# Patient Record
Sex: MALE | Race: BLACK OR AFRICAN AMERICAN | NOT HISPANIC OR LATINO | Employment: OTHER | URBAN - METROPOLITAN AREA
[De-identification: names, ages, dates, MRNs, and addresses within clinical notes are randomized per-mention and may not be internally consistent; named-entity substitution may affect disease eponyms.]

---

## 2023-03-06 LAB
ALBUMIN (G/DL) IN SER/PLAS: 3.7 G/DL (ref 3.4–5)
ANION GAP IN SER/PLAS: 14 MMOL/L (ref 10–20)
CALCIUM (MG/DL) IN SER/PLAS: 9.5 MG/DL (ref 8.6–10.6)
CARBON DIOXIDE, TOTAL (MMOL/L) IN SER/PLAS: 26 MMOL/L (ref 21–32)
CHLORIDE (MMOL/L) IN SER/PLAS: 108 MMOL/L (ref 98–107)
CREATININE (MG/DL) IN SER/PLAS: 0.76 MG/DL (ref 0.5–1.3)
ERYTHROCYTE DISTRIBUTION WIDTH (RATIO) BY AUTOMATED COUNT: 17.2 % (ref 11.5–14.5)
ERYTHROCYTE MEAN CORPUSCULAR HEMOGLOBIN CONCENTRATION (G/DL) BY AUTOMATED: 32.3 G/DL (ref 32–36)
ERYTHROCYTE MEAN CORPUSCULAR VOLUME (FL) BY AUTOMATED COUNT: 85 FL (ref 80–100)
ERYTHROCYTES (10*6/UL) IN BLOOD BY AUTOMATED COUNT: 3.94 X10E12/L (ref 4.5–5.9)
GFR MALE: >90 ML/MIN/1.73M2
GLUCOSE (MG/DL) IN SER/PLAS: 114 MG/DL (ref 74–99)
HEMATOCRIT (%) IN BLOOD BY AUTOMATED COUNT: 33.4 % (ref 41–52)
HEMOGLOBIN (G/DL) IN BLOOD: 10.8 G/DL (ref 13.5–17.5)
LEUKOCYTES (10*3/UL) IN BLOOD BY AUTOMATED COUNT: 6.9 X10E9/L (ref 4.4–11.3)
NRBC (PER 100 WBCS) BY AUTOMATED COUNT: 0 /100 WBC (ref 0–0)
PHOSPHATE (MG/DL) IN SER/PLAS: 2.5 MG/DL (ref 2.5–4.9)
PLATELETS (10*3/UL) IN BLOOD AUTOMATED COUNT: 358 X10E9/L (ref 150–450)
POTASSIUM (MMOL/L) IN SER/PLAS: 3.8 MMOL/L (ref 3.5–5.3)
SODIUM (MMOL/L) IN SER/PLAS: 144 MMOL/L (ref 136–145)
TACROLIMUS (NG/ML) IN BLOOD: 3.9 NG/ML (ref 2–15)
UREA NITROGEN (MG/DL) IN SER/PLAS: 12 MG/DL (ref 6–23)

## 2023-03-13 LAB
APPEARANCE, URINE: CLEAR
BILIRUBIN, URINE: NEGATIVE
BLOOD, URINE: ABNORMAL
COLOR, URINE: YELLOW
CREATININE (MG/DL) IN URINE: 26.3 MG/DL (ref 20–370)
GLUCOSE, URINE: NEGATIVE MG/DL
KETONES, URINE: NEGATIVE MG/DL
LEUKOCYTE ESTERASE, URINE: ABNORMAL
NITRITE, URINE: NEGATIVE
PH, URINE: 8 (ref 5–8)
PROTEIN (MG/DL) IN URINE: 15 MG/DL (ref 5–25)
PROTEIN, URINE: NEGATIVE MG/DL
PROTEIN/CREATININE (MG/MG) IN URINE: 0.57 MG/MG CREAT (ref 0–0.17)
RBC, URINE: 6 /HPF (ref 0–5)
SPECIFIC GRAVITY, URINE: 1.01 (ref 1–1.03)
TACROLIMUS (NG/ML) IN BLOOD: 3.7 NG/ML (ref 2–15)
UROBILINOGEN, URINE: <2 MG/DL (ref 0–1.9)
WBC, URINE: 23 /HPF (ref 0–5)

## 2023-03-14 LAB — URINE CULTURE: NORMAL

## 2023-03-21 LAB
ALBUMIN (G/DL) IN SER/PLAS: 3.4 G/DL (ref 3.4–5)
ANION GAP IN SER/PLAS: 12 MMOL/L (ref 10–20)
CALCIUM (MG/DL) IN SER/PLAS: 9.2 MG/DL (ref 8.6–10.6)
CARBON DIOXIDE, TOTAL (MMOL/L) IN SER/PLAS: 24 MMOL/L (ref 21–32)
CHLORIDE (MMOL/L) IN SER/PLAS: 106 MMOL/L (ref 98–107)
CREATININE (MG/DL) IN SER/PLAS: 0.81 MG/DL (ref 0.5–1.3)
ERYTHROCYTE DISTRIBUTION WIDTH (RATIO) BY AUTOMATED COUNT: 16.6 % (ref 11.5–14.5)
ERYTHROCYTE MEAN CORPUSCULAR HEMOGLOBIN CONCENTRATION (G/DL) BY AUTOMATED: 34.9 G/DL (ref 32–36)
ERYTHROCYTE MEAN CORPUSCULAR VOLUME (FL) BY AUTOMATED COUNT: 77 FL (ref 80–100)
ERYTHROCYTES (10*6/UL) IN BLOOD BY AUTOMATED COUNT: 3.92 X10E12/L (ref 4.5–5.9)
GFR MALE: >90 ML/MIN/1.73M2
GLUCOSE (MG/DL) IN SER/PLAS: 139 MG/DL (ref 74–99)
HEMATOCRIT (%) IN BLOOD BY AUTOMATED COUNT: 30.1 % (ref 41–52)
HEMOGLOBIN (G/DL) IN BLOOD: 10.5 G/DL (ref 13.5–17.5)
LEUKOCYTES (10*3/UL) IN BLOOD BY AUTOMATED COUNT: 7.2 X10E9/L (ref 4.4–11.3)
NRBC (PER 100 WBCS) BY AUTOMATED COUNT: 0 /100 WBC (ref 0–0)
PHOSPHATE (MG/DL) IN SER/PLAS: 2.9 MG/DL (ref 2.5–4.9)
PLATELETS (10*3/UL) IN BLOOD AUTOMATED COUNT: 401 X10E9/L (ref 150–450)
POTASSIUM (MMOL/L) IN SER/PLAS: 3.7 MMOL/L (ref 3.5–5.3)
SODIUM (MMOL/L) IN SER/PLAS: 138 MMOL/L (ref 136–145)
TACROLIMUS (NG/ML) IN BLOOD: 5.6 NG/ML (ref 2–15)
UREA NITROGEN (MG/DL) IN SER/PLAS: 16 MG/DL (ref 6–23)

## 2023-03-28 LAB
ALBUMIN (G/DL) IN SER/PLAS: 3.5 G/DL (ref 3.4–5)
ANION GAP IN SER/PLAS: 14 MMOL/L (ref 10–20)
CALCIUM (MG/DL) IN SER/PLAS: 9.1 MG/DL (ref 8.6–10.6)
CARBON DIOXIDE, TOTAL (MMOL/L) IN SER/PLAS: 23 MMOL/L (ref 21–32)
CHLORIDE (MMOL/L) IN SER/PLAS: 110 MMOL/L (ref 98–107)
CREATININE (MG/DL) IN SER/PLAS: 0.76 MG/DL (ref 0.5–1.3)
ERYTHROCYTE DISTRIBUTION WIDTH (RATIO) BY AUTOMATED COUNT: 16.8 % (ref 11.5–14.5)
ERYTHROCYTE MEAN CORPUSCULAR HEMOGLOBIN CONCENTRATION (G/DL) BY AUTOMATED: 34.1 G/DL (ref 32–36)
ERYTHROCYTE MEAN CORPUSCULAR VOLUME (FL) BY AUTOMATED COUNT: 77 FL (ref 80–100)
ERYTHROCYTES (10*6/UL) IN BLOOD BY AUTOMATED COUNT: 4.15 X10E12/L (ref 4.5–5.9)
GFR MALE: >90 ML/MIN/1.73M2
GLUCOSE (MG/DL) IN SER/PLAS: 96 MG/DL (ref 74–99)
HEMATOCRIT (%) IN BLOOD BY AUTOMATED COUNT: 32 % (ref 41–52)
HEMOGLOBIN (G/DL) IN BLOOD: 10.9 G/DL (ref 13.5–17.5)
LEUKOCYTES (10*3/UL) IN BLOOD BY AUTOMATED COUNT: 7.9 X10E9/L (ref 4.4–11.3)
NRBC (PER 100 WBCS) BY AUTOMATED COUNT: 0 /100 WBC (ref 0–0)
PHOSPHATE (MG/DL) IN SER/PLAS: 2.7 MG/DL (ref 2.5–4.9)
PLATELETS (10*3/UL) IN BLOOD AUTOMATED COUNT: 455 X10E9/L (ref 150–450)
POTASSIUM (MMOL/L) IN SER/PLAS: 4.5 MMOL/L (ref 3.5–5.3)
SODIUM (MMOL/L) IN SER/PLAS: 142 MMOL/L (ref 136–145)
TACROLIMUS (NG/ML) IN BLOOD: 10.6 NG/ML (ref 2–15)
UREA NITROGEN (MG/DL) IN SER/PLAS: 12 MG/DL (ref 6–23)

## 2023-03-30 LAB
CREATININE (MG/DL) IN URINE: NORMAL
PROTEIN (MG/DL) IN URINE: NORMAL
PROTEIN/CREATININE (MG/MG) IN URINE: NORMAL

## 2023-04-04 ENCOUNTER — APPOINTMENT (OUTPATIENT)
Dept: LAB | Facility: LAB | Age: 73
End: 2023-04-04
Payer: COMMERCIAL

## 2023-04-04 LAB
ALBUMIN (G/DL) IN SER/PLAS: 3.6 G/DL (ref 3.4–5)
ANION GAP IN SER/PLAS: 13 MMOL/L (ref 10–20)
CALCIUM (MG/DL) IN SER/PLAS: 9.3 MG/DL (ref 8.6–10.6)
CARBON DIOXIDE, TOTAL (MMOL/L) IN SER/PLAS: 24 MMOL/L (ref 21–32)
CHLORIDE (MMOL/L) IN SER/PLAS: 105 MMOL/L (ref 98–107)
CREATININE (MG/DL) IN SER/PLAS: 0.76 MG/DL (ref 0.5–1.3)
ERYTHROCYTE DISTRIBUTION WIDTH (RATIO) BY AUTOMATED COUNT: 17.1 % (ref 11.5–14.5)
ERYTHROCYTE MEAN CORPUSCULAR HEMOGLOBIN CONCENTRATION (G/DL) BY AUTOMATED: 33.5 G/DL (ref 32–36)
ERYTHROCYTE MEAN CORPUSCULAR VOLUME (FL) BY AUTOMATED COUNT: 77 FL (ref 80–100)
ERYTHROCYTES (10*6/UL) IN BLOOD BY AUTOMATED COUNT: 4.23 X10E12/L (ref 4.5–5.9)
GFR MALE: >90 ML/MIN/1.73M2
GLUCOSE (MG/DL) IN SER/PLAS: 89 MG/DL (ref 74–99)
HEMATOCRIT (%) IN BLOOD BY AUTOMATED COUNT: 32.5 % (ref 41–52)
HEMOGLOBIN (G/DL) IN BLOOD: 10.9 G/DL (ref 13.5–17.5)
INR IN PPP BY COAGULATION ASSAY: 5.1 (ref 0.9–1.1)
LEUKOCYTES (10*3/UL) IN BLOOD BY AUTOMATED COUNT: 7.8 X10E9/L (ref 4.4–11.3)
NRBC (PER 100 WBCS) BY AUTOMATED COUNT: 0 /100 WBC (ref 0–0)
PHOSPHATE (MG/DL) IN SER/PLAS: 2.9 MG/DL (ref 2.5–4.9)
PLATELETS (10*3/UL) IN BLOOD AUTOMATED COUNT: 401 X10E9/L (ref 150–450)
POTASSIUM (MMOL/L) IN SER/PLAS: 4 MMOL/L (ref 3.5–5.3)
PROTHROMBIN TIME (PT) IN PPP BY COAGULATION ASSAY: 60 SEC (ref 9.8–13.4)
SODIUM (MMOL/L) IN SER/PLAS: 138 MMOL/L (ref 136–145)
TACROLIMUS (NG/ML) IN BLOOD: 6.8 NG/ML (ref 2–15)
UREA NITROGEN (MG/DL) IN SER/PLAS: 12 MG/DL (ref 6–23)

## 2023-07-31 ENCOUNTER — APPOINTMENT (OUTPATIENT)
Dept: LAB | Facility: LAB | Age: 73
End: 2023-07-31
Payer: COMMERCIAL

## 2023-07-31 LAB
ALBUMIN (G/DL) IN SER/PLAS: 4.1 G/DL (ref 3.4–5)
ANION GAP IN SER/PLAS: 12 MMOL/L (ref 10–20)
CALCIUM (MG/DL) IN SER/PLAS: 9.5 MG/DL (ref 8.6–10.6)
CARBON DIOXIDE, TOTAL (MMOL/L) IN SER/PLAS: 26 MMOL/L (ref 21–32)
CHLORIDE (MMOL/L) IN SER/PLAS: 105 MMOL/L (ref 98–107)
CREATININE (MG/DL) IN SER/PLAS: 0.91 MG/DL (ref 0.5–1.3)
CREATININE (MG/DL) IN URINE: 93.3 MG/DL (ref 20–370)
ERYTHROCYTE DISTRIBUTION WIDTH (RATIO) BY AUTOMATED COUNT: 20.8 % (ref 11.5–14.5)
ERYTHROCYTE MEAN CORPUSCULAR HEMOGLOBIN CONCENTRATION (G/DL) BY AUTOMATED: 33.1 G/DL (ref 32–36)
ERYTHROCYTE MEAN CORPUSCULAR VOLUME (FL) BY AUTOMATED COUNT: 76 FL (ref 80–100)
ERYTHROCYTES (10*6/UL) IN BLOOD BY AUTOMATED COUNT: 4.51 X10E12/L (ref 4.5–5.9)
GFR MALE: 89 ML/MIN/1.73M2
GLUCOSE (MG/DL) IN SER/PLAS: 169 MG/DL (ref 74–99)
HEMATOCRIT (%) IN BLOOD BY AUTOMATED COUNT: 34.1 % (ref 41–52)
HEMOGLOBIN (G/DL) IN BLOOD: 11.3 G/DL (ref 13.5–17.5)
LEUKOCYTES (10*3/UL) IN BLOOD BY AUTOMATED COUNT: 6.3 X10E9/L (ref 4.4–11.3)
NRBC (PER 100 WBCS) BY AUTOMATED COUNT: 0 /100 WBC (ref 0–0)
PHOSPHATE (MG/DL) IN SER/PLAS: 3.1 MG/DL (ref 2.5–4.9)
PLATELETS (10*3/UL) IN BLOOD AUTOMATED COUNT: 309 X10E9/L (ref 150–450)
POTASSIUM (MMOL/L) IN SER/PLAS: 4.2 MMOL/L (ref 3.5–5.3)
SODIUM (MMOL/L) IN SER/PLAS: 139 MMOL/L (ref 136–145)
TACROLIMUS (NG/ML) IN BLOOD: 3.3 NG/ML (ref 2–15)
UREA NITROGEN (MG/DL) IN SER/PLAS: 16 MG/DL (ref 6–23)

## 2023-08-11 ENCOUNTER — APPOINTMENT (OUTPATIENT)
Dept: LAB | Facility: LAB | Age: 73
End: 2023-08-11
Payer: COMMERCIAL

## 2023-08-11 LAB
ALBUMIN (G/DL) IN SER/PLAS: 4 G/DL (ref 3.4–5)
ANION GAP IN SER/PLAS: 13 MMOL/L (ref 10–20)
CALCIUM (MG/DL) IN SER/PLAS: 9.7 MG/DL (ref 8.6–10.6)
CARBON DIOXIDE, TOTAL (MMOL/L) IN SER/PLAS: 27 MMOL/L (ref 21–32)
CHLORIDE (MMOL/L) IN SER/PLAS: 105 MMOL/L (ref 98–107)
CREATININE (MG/DL) IN SER/PLAS: 1.03 MG/DL (ref 0.5–1.3)
CREATININE (MG/DL) IN URINE: 73.4 MG/DL (ref 20–370)
ERYTHROCYTE DISTRIBUTION WIDTH (RATIO) BY AUTOMATED COUNT: 19.9 % (ref 11.5–14.5)
ERYTHROCYTE MEAN CORPUSCULAR HEMOGLOBIN CONCENTRATION (G/DL) BY AUTOMATED: 33 G/DL (ref 32–36)
ERYTHROCYTE MEAN CORPUSCULAR VOLUME (FL) BY AUTOMATED COUNT: 75 FL (ref 80–100)
ERYTHROCYTES (10*6/UL) IN BLOOD BY AUTOMATED COUNT: 4.44 X10E12/L (ref 4.5–5.9)
GFR MALE: 77 ML/MIN/1.73M2
GLUCOSE (MG/DL) IN SER/PLAS: 111 MG/DL (ref 74–99)
HEMATOCRIT (%) IN BLOOD BY AUTOMATED COUNT: 33.3 % (ref 41–52)
HEMOGLOBIN (G/DL) IN BLOOD: 11 G/DL (ref 13.5–17.5)
LEUKOCYTES (10*3/UL) IN BLOOD BY AUTOMATED COUNT: 6.2 X10E9/L (ref 4.4–11.3)
NRBC (PER 100 WBCS) BY AUTOMATED COUNT: 0 /100 WBC (ref 0–0)
PHOSPHATE (MG/DL) IN SER/PLAS: 3.6 MG/DL (ref 2.5–4.9)
PLATELETS (10*3/UL) IN BLOOD AUTOMATED COUNT: 353 X10E9/L (ref 150–450)
POTASSIUM (MMOL/L) IN SER/PLAS: 3.9 MMOL/L (ref 3.5–5.3)
PROTEIN (MG/DL) IN URINE: 44 MG/DL (ref 5–25)
PROTEIN/CREATININE (MG/MG) IN URINE: 0.6 MG/MG CREAT (ref 0–0.17)
SODIUM (MMOL/L) IN SER/PLAS: 141 MMOL/L (ref 136–145)
TACROLIMUS (NG/ML) IN BLOOD: 8.5 NG/ML (ref 2–15)
UREA NITROGEN (MG/DL) IN SER/PLAS: 18 MG/DL (ref 6–23)

## 2023-09-08 DIAGNOSIS — M54.9 BACK PAIN, UNSPECIFIED BACK LOCATION, UNSPECIFIED BACK PAIN LATERALITY, UNSPECIFIED CHRONICITY: Primary | ICD-10-CM

## 2023-09-08 RX ORDER — OXYCODONE HYDROCHLORIDE 5 MG/1
5 TABLET ORAL EVERY 8 HOURS PRN
Qty: 90 TABLET | Refills: 0 | Status: SHIPPED | OUTPATIENT
Start: 2023-09-08 | End: 2023-11-27 | Stop reason: ALTCHOICE

## 2023-09-12 ENCOUNTER — DOCUMENTATION (OUTPATIENT)
Dept: POST ACUTE CARE | Facility: EXTERNAL LOCATION | Age: 73
End: 2023-09-12
Payer: COMMERCIAL

## 2023-09-12 NOTE — PROGRESS NOTES
Therapy update:  9/11/2023: FWW/ 70'/ min A-CGA/, Transfer training to increase functional task  performance/ CGA. Bed mobility training to increase functional task performance/ sit to supine / SBA

## 2023-09-14 ENCOUNTER — NURSING HOME VISIT (OUTPATIENT)
Dept: POST ACUTE CARE | Facility: EXTERNAL LOCATION | Age: 73
End: 2023-09-14
Payer: COMMERCIAL

## 2023-09-14 DIAGNOSIS — E78.5 HYPERLIPIDEMIA, UNSPECIFIED HYPERLIPIDEMIA TYPE: ICD-10-CM

## 2023-09-14 DIAGNOSIS — N18.6 ESRD (END STAGE RENAL DISEASE) ON DIALYSIS (MULTI): ICD-10-CM

## 2023-09-14 DIAGNOSIS — R06.09 DOE (DYSPNEA ON EXERTION): ICD-10-CM

## 2023-09-14 DIAGNOSIS — I10 PRIMARY HYPERTENSION: Primary | ICD-10-CM

## 2023-09-14 DIAGNOSIS — Z91.81 STATUS POST FALL: ICD-10-CM

## 2023-09-14 DIAGNOSIS — I51.89 DIASTOLIC DYSFUNCTION: ICD-10-CM

## 2023-09-14 DIAGNOSIS — K74.60 CIRRHOSIS OF LIVER WITHOUT ASCITES, UNSPECIFIED HEPATIC CIRRHOSIS TYPE (MULTI): ICD-10-CM

## 2023-09-14 DIAGNOSIS — J44.89 OBSTRUCTIVE CHRONIC BRONCHITIS WITHOUT EXACERBATION (MULTI): ICD-10-CM

## 2023-09-14 DIAGNOSIS — Z99.2 ESRD (END STAGE RENAL DISEASE) ON DIALYSIS (MULTI): ICD-10-CM

## 2023-09-14 DIAGNOSIS — I73.9 PAD (PERIPHERAL ARTERY DISEASE) (CMS-HCC): ICD-10-CM

## 2023-09-14 PROBLEM — I11.9 HYPERTENSIVE HEART DISEASE WITHOUT CONGESTIVE HEART FAILURE: Status: ACTIVE | Noted: 2023-09-14

## 2023-09-14 PROCEDURE — 99305 1ST NF CARE MODERATE MDM 35: CPT | Performed by: INTERNAL MEDICINE

## 2023-09-14 NOTE — LETTER
Patient: Albert Schwartz  : 1950    Encounter Date: 2023    HISTORY & PHYSICAL    Subjective  Chief complaint: Albert Schwartz is a 72 y.o. male who is a acute skilled care patient being seen and evaluated for multiple medical problems.  Patient presents for weakness.    HPI:  Patient is a 72 year old male who presented to the ED after a fall. Pt complained of nausea and warmth, and shortness of breath post fall. Patient's imaging was clear but labs showed leukocytosis. Pt was found to have a UTI and given antibiotics and fluid. Patient was discharged back to the nursing facility.         Past Medical History:   Diagnosis Date   • Abnormality of alphafetoprotein     Elevated AFP   • Alcohol dependence, uncomplicated (CMS/HCC) 2018    Alcohol dependence   • Alcohol dependence, uncomplicated (CMS/HCC) 2018    Alcohol dependence   • Alcohol dependence, uncomplicated (CMS/HCC) 2018    Alcohol dependence   • Alcohol dependence, uncomplicated (CMS/HCC) 2018    Alcohol dependence   • Alcoholic cirrhosis of liver with ascites (CMS/HCC) 10/05/2017    Alcoholic cirrhosis of liver with ascites   • Awaiting organ transplant status 2018    Pre-liver transplant, listed   • Awaiting organ transplant status 2018    Pre-liver transplant, listed   • Awaiting organ transplant status 2018    Pre-liver transplant, listed   • Awaiting organ transplant status 2018    Pre-liver transplant, listed   • Benign prostatic hyperplasia with lower urinary tract symptoms 2018    BPH associated with nocturia   • Benign prostatic hyperplasia with lower urinary tract symptoms 2018    BPH associated with nocturia   • Benign prostatic hyperplasia with lower urinary tract symptoms 2018    BPH associated with nocturia   • Benign prostatic hyperplasia with lower urinary tract symptoms 2018    BPH associated with nocturia   • Chronic diastolic (congestive) heart failure  (CMS/[facility]) 01/18/2018    Chronic diastolic congestive heart failure   • Chronic diastolic (congestive) heart failure (CMS/[facility]) 01/18/2018    Chronic diastolic congestive heart failure   • Chronic diastolic (congestive) heart failure (CMS/[facility]) 01/18/2018    Chronic diastolic congestive heart failure   • Chronic diastolic (congestive) heart failure (CMS/[facility]) 01/18/2018    Chronic diastolic congestive heart failure   • Chronic kidney disease, stage 3 unspecified (CMS/HCC) 01/18/2018    Chronic kidney disease (CKD), stage III (moderate)   • Chronic kidney disease, stage 3 unspecified (CMS/HCC) 01/18/2018    Chronic kidney disease (CKD), stage III (moderate)   • Chronic kidney disease, stage 3 unspecified (CMS/HCC) 01/18/2018    Chronic kidney disease (CKD), stage III (moderate)   • Chronic kidney disease, stage 3 unspecified (CMS/HCC) 01/18/2018    Chronic kidney disease (CKD), stage III (moderate)   • Chronic kidney disease, stage 4 (severe) (CMS/HCC) 10/20/2016    Chronic kidney disease, stage IV (severe)   • Chronic viral hepatitis C (CMS/[facility]) 01/18/2018    Chronic viral hepatitis C   • Chronic viral hepatitis C (CMS/[facility]) 01/18/2018    Chronic viral hepatitis C   • Constipation, unspecified 01/18/2018    Constipation   • Constipation, unspecified 01/18/2018    Constipation   • Constipation, unspecified 01/18/2018    Constipation   • Constipation, unspecified 01/18/2018    Constipation   • Disorder of kidney and ureter, unspecified 01/18/2018    Renal/ureteral disease   • Disorder of kidney and ureter, unspecified 01/18/2018    Renal/ureteral disease   • Disorder of kidney and ureter, unspecified 01/18/2018    Renal/ureteral disease   • Disorder of kidney and ureter, unspecified 01/18/2018    Renal/ureteral disease   • Disorder of male genital organs, unspecified 12/11/2018    Testicular abnormality   • Disorder of male genital organs, unspecified 01/18/2018    Testicular abnormality   • Disorder of male genital  organs, unspecified 01/18/2018    Testicular abnormality   • Disorder of male genital organs, unspecified 01/18/2018    Testicular abnormality   • Disorder of male genital organs, unspecified 01/18/2018    Testicular abnormality   • Encounter for other preprocedural examination 01/18/2018    Preoperative testing   • Encounter for other preprocedural examination 01/18/2018    Encounter for pre-transplant evaluation for chronic liver disease   • Encounter for other preprocedural examination 01/18/2018    Pre-transplant evaluation for ESRD (end stage renal disease)   • Encounter for other preprocedural examination 01/18/2018    Encounter for pre-transplant evaluation for chronic liver disease   • Encounter for other preprocedural examination 01/18/2018    Preoperative testing   • Encounter for other preprocedural examination 01/18/2018    Pre-transplant evaluation for ESRD (end stage renal disease)   • Encounter for other preprocedural examination 01/18/2018    Encounter for pre-transplant evaluation for chronic liver disease   • Encounter for other preprocedural examination 01/18/2018    Pre-transplant evaluation for ESRD (end stage renal disease)   • Encounter for other preprocedural examination 01/18/2018    Preoperative testing   • Encounter for other preprocedural examination 01/18/2018    Encounter for pre-transplant evaluation for chronic liver disease   • Encounter for other preprocedural examination 01/18/2018    Preoperative testing   • Encounter for other preprocedural examination 01/18/2018    Pre-transplant evaluation for ESRD (end stage renal disease)   • Encounter for screening for malignant neoplasm of prostate 01/18/2018    Encounter for prostate cancer screening   • Encounter for screening for malignant neoplasm of prostate 01/18/2018    Encounter for prostate cancer screening   • Encounter for screening for malignant neoplasm of prostate 01/18/2018    Encounter for prostate cancer screening   •  Encounter for screening for malignant neoplasm of prostate 01/18/2018    Encounter for prostate cancer screening   • End stage renal disease (CMS/HCC) 01/18/2018    ESRD (end stage renal disease) on dialysis   • End stage renal disease (CMS/HCC) 01/18/2018    ESRD (end stage renal disease) on dialysis   • Essential (primary) hypertension 01/27/2020    Hypertension   • Immunodeficiency, unspecified (CMS/HCC) 01/18/2018    Immunosuppression   • Immunodeficiency, unspecified (CMS/HCC) 01/18/2018    Immunosuppression   • Immunodeficiency, unspecified (CMS/HCC) 01/18/2018    Immunosuppression   • Immunodeficiency, unspecified (CMS/HCC) 01/18/2018    Immunosuppression   • Kidney transplant status 01/18/2018    Kidney replaced by transplant   • Kidney transplant status 01/18/2018    Kidney replaced by transplant   • Liver disease, unspecified 01/18/2018    Liver disease   • Liver disease, unspecified 01/18/2018    Liver disease   • Liver disease, unspecified 01/18/2018    Liver disease   • Liver disease, unspecified 01/18/2018    Liver disease   • Other ascites 11/07/2022    Ascites   • Other conditions influencing health status 01/18/2018    Wound pain   • Other conditions influencing health status 01/18/2018    Wound pain   • Other conditions influencing health status 01/18/2018    Wound pain   • Other conditions influencing health status 01/18/2018    Wound pain   • Other ill-defined heart diseases 01/18/2018    Diastolic dysfunction   • Other ill-defined heart diseases 01/18/2018    Diastolic dysfunction   • Other ill-defined heart diseases 01/18/2018    Diastolic dysfunction   • Other ill-defined heart diseases 01/18/2018    Diastolic dysfunction   • Other specified personal risk factors, not elsewhere classified 01/18/2018    At risk for infection transmitted from donor   • Other specified personal risk factors, not elsewhere classified 01/18/2018    At risk for infection transmitted from donor   • Other specified  personal risk factors, not elsewhere classified 01/18/2018    At risk for infection transmitted from donor   • Other specified personal risk factors, not elsewhere classified 01/18/2018    At risk for infection transmitted from donor   • Pain in left hand 01/18/2018    Pain of left hand   • Pain in left hand 01/18/2018    Pain of left hand   • Pain in left hand 01/18/2018    Pain of left hand   • Pain in left hand 01/18/2018    Pain of left hand   • Peripheral vascular disease, unspecified (CMS/HCC) 01/18/2018    PAD (peripheral artery disease)   • Peripheral vascular disease, unspecified (CMS/HCC) 01/18/2018    PAD (peripheral artery disease)   • Peripheral vascular disease, unspecified (CMS/HCC) 01/18/2018    PAD (peripheral artery disease)   • Peripheral vascular disease, unspecified (CMS/HCC) 01/18/2018    PAD (peripheral artery disease)   • Personal history of other diseases of the circulatory system 08/18/2014    History of essential hypertension   • Personal history of other diseases of the musculoskeletal system and connective tissue     History of arthritis   • Pruritus, unspecified 02/21/2018    Pruritus   • Type 2 diabetes mellitus without complications (CMS/HCC) 09/01/2022    Diabetes mellitus   • Unspecified cirrhosis of liver (CMS/HCC) 01/18/2018    Cirrhosis   • Unspecified cirrhosis of liver (CMS/HCC) 01/18/2018    Cirrhosis   • Unspecified cirrhosis of liver (CMS/HCC) 01/18/2018    Cirrhosis   • Unspecified cirrhosis of liver (CMS/HCC) 01/18/2018    Cirrhosis   • Unspecified complication of cardiac and vascular prosthetic device, implant and graft, initial encounter 01/18/2018    Complication of arteriovenous dialysis fistula, initial encounter   • Unspecified complication of cardiac and vascular prosthetic device, implant and graft, initial encounter 01/18/2018    Complication of arteriovenous dialysis fistula, initial encounter   • Unspecified complication of cardiac and vascular prosthetic  device, implant and graft, initial encounter 01/18/2018    Complication of arteriovenous dialysis fistula, initial encounter   • Unspecified complication of cardiac and vascular prosthetic device, implant and graft, initial encounter 01/18/2018    Complication of arteriovenous dialysis fistula, initial encounter   • Unspecified visual disturbance 01/18/2018    Vision changes   • Unspecified visual disturbance 01/18/2018    Vision changes   • Unspecified visual disturbance 01/18/2018    Vision changes   • Unspecified visual disturbance 01/18/2018    Vision changes       Past Surgical History:   Procedure Laterality Date   • CATARACT EXTRACTION  01/18/2018    Cataract Extraction   • FL GUIDED ABDOMINAL PARACENTESIS  3/30/2015    FL GUIDED ABDOMINAL PARACENTESIS 3/30/2015 [facility] [place] [place] AIB LEGACY   • FL GUIDED ABDOMINAL PARACENTESIS  4/14/2015    FL GUIDED ABDOMINAL PARACENTESIS 4/14/2015 [facility] [place] [place] INPATIENT LEGACY   • FL GUIDED ABDOMINAL PARACENTESIS  8/28/2015    FL GUIDED ABDOMINAL PARACENTESIS 8/28/2015 [facility] [place] [place] AIB LEGACY   • KIDNEY SURGERY  06/02/2020    Kidney Surgery   • OTHER SURGICAL HISTORY  08/18/2014    Brain Surgery   • OTHER SURGICAL HISTORY  06/30/2020    Renal Transplant   • OTHER SURGICAL HISTORY  06/02/2020    Incisional Hernia Repair   • SPLENECTOMY, TOTAL  06/02/2020    Splenectomy   • US GUIDED ABDOMINAL PARACENTESIS  3/20/2014    US GUIDED ABDOMINAL PARACENTESIS 3/20/2014 [facility] [place] [place] AIB LEGACY   • US GUIDED ABDOMINAL PARACENTESIS  8/14/2014    US GUIDED ABDOMINAL PARACENTESIS 8/14/2014 [facility] [place] [place] AIB LEGACY   • US GUIDED ABDOMINAL PARACENTESIS  11/17/2014    US GUIDED ABDOMINAL PARACENTESIS 11/17/2014 [facility] [place] [place] AIB LEGACY   • US GUIDED ABDOMINAL PARACENTESIS  12/11/2014    US GUIDED ABDOMINAL PARACENTESIS 12/11/2014 [facility] CLINICAL LEGACY   • US GUIDED ABDOMINAL PARACENTESIS  1/27/2015    US GUIDED ABDOMINAL PARACENTESIS 1/27/2015 [facility] [place] [place] ANCILLARY LEGACY   • US GUIDED ABDOMINAL PARACENTESIS  2/19/2015    US GUIDED ABDOMINAL PARACENTESIS  2/19/2015 [facility] [place] AIB LEGACY   • US GUIDED ABDOMINAL PARACENTESIS  3/18/2015    US GUIDED ABDOMINAL PARACENTESIS 3/18/2015 [facility] [place] AIB LEGACY   • US GUIDED ABDOMINAL PARACENTESIS  4/17/2015    US GUIDED ABDOMINAL PARACENTESIS 4/17/2015 [facility] [place] INPATIENT LEGACY   • US GUIDED ABDOMINAL PARACENTESIS  7/14/2015    US GUIDED ABDOMINAL PARACENTESIS 7/14/2015 [facility] [place] AIB LEGACY   • US GUIDED ABDOMINAL PARACENTESIS  10/12/2015    US GUIDED ABDOMINAL PARACENTESIS 10/12/2015 [facility] CLINICAL LEGACY   • US GUIDED ABDOMINAL PARACENTESIS  10/19/2015    US GUIDED ABDOMINAL PARACENTESIS 10/19/2015 [facility] CLINICAL LEGACY       No family history on file.    Social History     Socioeconomic History   • Marital status: Single     Spouse name: Not on file   • Number of children: Not on file   • Years of education: Not on file   • Highest education level: Not on file   Occupational History   • Not on file   Tobacco Use   • Smoking status: Not on file   • Smokeless tobacco: Not on file   Substance and Sexual Activity   • Alcohol use: Not on file   • Drug use: Not on file   • Sexual activity: Not on file   Other Topics Concern   • Not on file   Social History Narrative   • Not on file     Social Determinants of Health     Financial Resource Strain: Not on file   Food Insecurity: Not on file   Transportation Needs: Not on file   Physical Activity: Not on file   Stress: Not on file   Social Connections: Not on file   Intimate Partner Violence: Not on file   Housing Stability: Not on file       Vital signs: 170/79, 98.2, 64, 17, 186.0, 99%    Objective  Physical Exam  Vitals reviewed.   Constitutional:       Appearance: Normal appearance.   HENT:      Head: Normocephalic and atraumatic.   Cardiovascular:      Rate and Rhythm: Normal rate and regular rhythm.   Pulmonary:      Effort: Pulmonary effort is normal.      Breath sounds: Normal breath sounds.   Abdominal:      General: Bowel sounds are normal.      Palpations: Abdomen is soft.   Musculoskeletal:       Cervical back: Neck supple.   Skin:     General: Skin is warm and dry.   Neurological:      General: No focal deficit present.      Mental Status: He is alert.   Psychiatric:         Mood and Affect: Mood normal.         Behavior: Behavior is cooperative.         Assessment/Plan  Problem List Items Addressed This Visit       Primary hypertension - Primary    Diastolic dysfunction    TRIPLETT (dyspnea on exertion)    ESRD (end stage renal disease) on dialysis (CMS/HCC)    Hepatic cirrhosis (CMS/HCC)    Hyperlipidemia    Obstructive chronic bronchitis without exacerbation (CMS/HCC)    PAD (peripheral artery disease) (CMS/HCC)    Status post fall     Medications, treatments, and labs reviewed  Continue medications and treatments as listed in [facility]    Scribe Attestation  I, Frieda Webster Scribshani   attest that this documentation has been prepared under the direction and in the presence of Caleb Mcdaniel MD.    Provider Attestation - Scribe documentation  All medical record entries made by the Scribe were at my direction and personally dictated by me. I have reviewed the chart and agree that the record accurately reflects my personal performance of the history, physical exam, discussion and plan.    Caleb Mcdaniel MD          Electronically Signed By: Caleb Mcdaniel MD   9/14/23  1:48 PM

## 2023-09-14 NOTE — PROGRESS NOTES
HISTORY & PHYSICAL    Subjective   Chief complaint: Albert Schwartz is a 72 y.o. male who is a acute skilled care patient being seen and evaluated for multiple medical problems.  Patient presents for weakness.    HPI:  Patient is a 72 year old male who presented to the ED after a fall. Pt complained of nausea and warmth, and shortness of breath post fall. Patient's imaging was clear but labs showed leukocytosis. Pt was found to have a UTI and given antibiotics and fluid. Patient was discharged back to the nursing facility.         Past Medical History:   Diagnosis Date    Abnormality of alphafetoprotein     Elevated AFP    Alcohol dependence, uncomplicated (CMS/HCC) 01/18/2018    Alcohol dependence    Alcohol dependence, uncomplicated (CMS/HCC) 01/18/2018    Alcohol dependence    Alcohol dependence, uncomplicated (CMS/HCC) 01/18/2018    Alcohol dependence    Alcohol dependence, uncomplicated (CMS/HCC) 01/18/2018    Alcohol dependence    Alcoholic cirrhosis of liver with ascites (CMS/HCC) 10/05/2017    Alcoholic cirrhosis of liver with ascites    Awaiting organ transplant status 01/18/2018    Pre-liver transplant, listed    Awaiting organ transplant status 01/18/2018    Pre-liver transplant, listed    Awaiting organ transplant status 01/18/2018    Pre-liver transplant, listed    Awaiting organ transplant status 01/18/2018    Pre-liver transplant, listed    Benign prostatic hyperplasia with lower urinary tract symptoms 01/18/2018    BPH associated with nocturia    Benign prostatic hyperplasia with lower urinary tract symptoms 01/18/2018    BPH associated with nocturia    Benign prostatic hyperplasia with lower urinary tract symptoms 01/18/2018    BPH associated with nocturia    Benign prostatic hyperplasia with lower urinary tract symptoms 01/18/2018    BPH associated with nocturia    Chronic diastolic (congestive) heart failure (CMS/HCC) 01/18/2018    Chronic diastolic congestive heart failure    Chronic diastolic  (congestive) heart failure (CMS/HCC) 01/18/2018    Chronic diastolic congestive heart failure    Chronic diastolic (congestive) heart failure (CMS/HCC) 01/18/2018    Chronic diastolic congestive heart failure    Chronic diastolic (congestive) heart failure (CMS/HCC) 01/18/2018    Chronic diastolic congestive heart failure    Chronic kidney disease, stage 3 unspecified (CMS/[facility]) 01/18/2018    Chronic kidney disease (CKD), stage III (moderate)    Chronic kidney disease, stage 3 unspecified (CMS/HCC) 01/18/2018    Chronic kidney disease (CKD), stage III (moderate)    Chronic kidney disease, stage 3 unspecified (CMS/HCC) 01/18/2018    Chronic kidney disease (CKD), stage III (moderate)    Chronic kidney disease, stage 3 unspecified (CMS/HCC) 01/18/2018    Chronic kidney disease (CKD), stage III (moderate)    Chronic kidney disease, stage 4 (severe) (CMS/[facility]) 10/20/2016    Chronic kidney disease, stage IV (severe)    Chronic viral hepatitis C (CMS/[facility]) 01/18/2018    Chronic viral hepatitis C    Chronic viral hepatitis C (CMS/[facility]) 01/18/2018    Chronic viral hepatitis C    Constipation, unspecified 01/18/2018    Constipation    Constipation, unspecified 01/18/2018    Constipation    Constipation, unspecified 01/18/2018    Constipation    Constipation, unspecified 01/18/2018    Constipation    Disorder of kidney and ureter, unspecified 01/18/2018    Renal/ureteral disease    Disorder of kidney and ureter, unspecified 01/18/2018    Renal/ureteral disease    Disorder of kidney and ureter, unspecified 01/18/2018    Renal/ureteral disease    Disorder of kidney and ureter, unspecified 01/18/2018    Renal/ureteral disease    Disorder of male genital organs, unspecified 12/11/2018    Testicular abnormality    Disorder of male genital organs, unspecified 01/18/2018    Testicular abnormality    Disorder of male genital organs, unspecified 01/18/2018    Testicular abnormality    Disorder of male genital organs, unspecified 01/18/2018     Testicular abnormality    Disorder of male genital organs, unspecified 01/18/2018    Testicular abnormality    Encounter for other preprocedural examination 01/18/2018    Preoperative testing    Encounter for other preprocedural examination 01/18/2018    Encounter for pre-transplant evaluation for chronic liver disease    Encounter for other preprocedural examination 01/18/2018    Pre-transplant evaluation for ESRD (end stage renal disease)    Encounter for other preprocedural examination 01/18/2018    Encounter for pre-transplant evaluation for chronic liver disease    Encounter for other preprocedural examination 01/18/2018    Preoperative testing    Encounter for other preprocedural examination 01/18/2018    Pre-transplant evaluation for ESRD (end stage renal disease)    Encounter for other preprocedural examination 01/18/2018    Encounter for pre-transplant evaluation for chronic liver disease    Encounter for other preprocedural examination 01/18/2018    Pre-transplant evaluation for ESRD (end stage renal disease)    Encounter for other preprocedural examination 01/18/2018    Preoperative testing    Encounter for other preprocedural examination 01/18/2018    Encounter for pre-transplant evaluation for chronic liver disease    Encounter for other preprocedural examination 01/18/2018    Preoperative testing    Encounter for other preprocedural examination 01/18/2018    Pre-transplant evaluation for ESRD (end stage renal disease)    Encounter for screening for malignant neoplasm of prostate 01/18/2018    Encounter for prostate cancer screening    Encounter for screening for malignant neoplasm of prostate 01/18/2018    Encounter for prostate cancer screening    Encounter for screening for malignant neoplasm of prostate 01/18/2018    Encounter for prostate cancer screening    Encounter for screening for malignant neoplasm of prostate 01/18/2018    Encounter for prostate cancer screening    End stage renal disease  (CMS/[facility]) 01/18/2018    ESRD (end stage renal disease) on dialysis    End stage renal disease (CMS/[facility]) 01/18/2018    ESRD (end stage renal disease) on dialysis    Essential (primary) hypertension 01/27/2020    Hypertension    Immunodeficiency, unspecified (CMS/[facility]) 01/18/2018    Immunosuppression    Immunodeficiency, unspecified (CMS/[facility]) 01/18/2018    Immunosuppression    Immunodeficiency, unspecified (CMS/[facility]) 01/18/2018    Immunosuppression    Immunodeficiency, unspecified (CMS/[facility]) 01/18/2018    Immunosuppression    Kidney transplant status 01/18/2018    Kidney replaced by transplant    Kidney transplant status 01/18/2018    Kidney replaced by transplant    Liver disease, unspecified 01/18/2018    Liver disease    Liver disease, unspecified 01/18/2018    Liver disease    Liver disease, unspecified 01/18/2018    Liver disease    Liver disease, unspecified 01/18/2018    Liver disease    Other ascites 11/07/2022    Ascites    Other conditions influencing health status 01/18/2018    Wound pain    Other conditions influencing health status 01/18/2018    Wound pain    Other conditions influencing health status 01/18/2018    Wound pain    Other conditions influencing health status 01/18/2018    Wound pain    Other ill-defined heart diseases 01/18/2018    Diastolic dysfunction    Other ill-defined heart diseases 01/18/2018    Diastolic dysfunction    Other ill-defined heart diseases 01/18/2018    Diastolic dysfunction    Other ill-defined heart diseases 01/18/2018    Diastolic dysfunction    Other specified personal risk factors, not elsewhere classified 01/18/2018    At risk for infection transmitted from donor    Other specified personal risk factors, not elsewhere classified 01/18/2018    At risk for infection transmitted from donor    Other specified personal risk factors, not elsewhere classified 01/18/2018    At risk for infection transmitted from donor    Other specified personal risk factors, not elsewhere  classified 01/18/2018    At risk for infection transmitted from donor    Pain in left hand 01/18/2018    Pain of left hand    Pain in left hand 01/18/2018    Pain of left hand    Pain in left hand 01/18/2018    Pain of left hand    Pain in left hand 01/18/2018    Pain of left hand    Peripheral vascular disease, unspecified (CMS/HCC) 01/18/2018    PAD (peripheral artery disease)    Peripheral vascular disease, unspecified (CMS/HCC) 01/18/2018    PAD (peripheral artery disease)    Peripheral vascular disease, unspecified (CMS/HCC) 01/18/2018    PAD (peripheral artery disease)    Peripheral vascular disease, unspecified (CMS/HCC) 01/18/2018    PAD (peripheral artery disease)    Personal history of other diseases of the circulatory system 08/18/2014    History of essential hypertension    Personal history of other diseases of the musculoskeletal system and connective tissue     History of arthritis    Pruritus, unspecified 02/21/2018    Pruritus    Type 2 diabetes mellitus without complications (CMS/HCC) 09/01/2022    Diabetes mellitus    Unspecified cirrhosis of liver (CMS/HCC) 01/18/2018    Cirrhosis    Unspecified cirrhosis of liver (CMS/HCC) 01/18/2018    Cirrhosis    Unspecified cirrhosis of liver (CMS/HCC) 01/18/2018    Cirrhosis    Unspecified cirrhosis of liver (CMS/HCC) 01/18/2018    Cirrhosis    Unspecified complication of cardiac and vascular prosthetic device, implant and graft, initial encounter 01/18/2018    Complication of arteriovenous dialysis fistula, initial encounter    Unspecified complication of cardiac and vascular prosthetic device, implant and graft, initial encounter 01/18/2018    Complication of arteriovenous dialysis fistula, initial encounter    Unspecified complication of cardiac and vascular prosthetic device, implant and graft, initial encounter 01/18/2018    Complication of arteriovenous dialysis fistula, initial encounter    Unspecified complication of cardiac and vascular prosthetic  device, implant and graft, initial encounter 01/18/2018    Complication of arteriovenous dialysis fistula, initial encounter    Unspecified visual disturbance 01/18/2018    Vision changes    Unspecified visual disturbance 01/18/2018    Vision changes    Unspecified visual disturbance 01/18/2018    Vision changes    Unspecified visual disturbance 01/18/2018    Vision changes       Past Surgical History:   Procedure Laterality Date    CATARACT EXTRACTION  01/18/2018    Cataract Extraction    FL GUIDED ABDOMINAL PARACENTESIS  3/30/2015    [place] GUID[place]BDOMINAL PARACENTES[place] [facility] [place]    [place] GUIDED ABDOMINAL PARACENTESIS  4/14/2015    [place] GUIDED ABDOMINAL PARACENTESIS [place] [facility] [place]    FL GUIDED ABDOMINAL PARACENTESIS  8/28/2015    FL GUIDED ABDOMINAL PARACENTESIS 8[place] [facility] [place]    KIDNEY SURGERY  06/02/2020    Kidney Surgery    OTHER SURGICAL HISTORY  08/18/2014    Brain Surgery    OTHER SURGICAL HISTORY  06/30/2020    Renal Transplant    OTHER SURGICAL HISTORY  06/02/2020    Incisional Hernia Repair    SPLENECTOMY, TOTAL  06/02/2020    Splenectomy    US GUIDED ABDOMINAL PARACENTESIS  3/20/2014    [place] GUIDED ABDOMINAL PARACENTESIS 3[place] [facility] [place]    US GUIDED ABDOMINAL PARACENTESIS  8/14/2014    [place] GUIDED ABDOMINAL PARACENTESIS 8[place]14/2014 [facility] [place]    US GUIDED ABDOMINAL PARACENTESIS  11/17/2014    [place] GUIDED ABDOMINAL PARACENTESIS 11[place]17/2014 [facility] [place]    US GUIDED ABDOMINAL PARACENTESIS  12/11/2014    US GUIDED ABDOMINAL PARACENTESIS 12/11/2014 [facility] CL[facility]ICAL LEGAC[facility]    US GUIDED ABDOMINAL PARACENTESIS  1/27/2015    [place] GUIDED ABDOMINAL PARACENTESIS [place] [facility] [place]    US GUIDED ABDOMINAL PARACENTESIS  2/19/2015    [place] GUIDED ABDOMINAL PARACENTESIS 2[place]19/[place] [facility]    US GUIDED ABDOMINAL PARACENTESIS  3/18/2015    [place] GUIDED ABDOMINAL PARACENTESIS 3[place]18/[place] [facility] [place]    [place] GUIDED ABDOMINAL PARACENTESIS  4/17/2015    [place]BDOMINAL  [place]ES[place] [facility] [place]    [place] GUIDED ABDOMINAL PARACENTESIS  7/14/2015    [place]BDOMINAL [place]AC[place]ES[place] [facility] [place]    [place] GUIDED ABDOMINAL PARACENTESIS  10/12/2015    US GUIDED ABDOMINAL PARACENTESIS 10/12/2015 [facility] CLINICAL LEGACY    US GUIDED ABDOMINAL PARACENTESIS  10/19/2015    [place] GUIDED ABDOMINAL PARACENTESIS 10/19/2015 [facility] CLINICAL LEGACY       No family history on file.    Social History     Socioeconomic History    Marital status: Single     Spouse name: Not on file    Number of children: Not on file    Years of education: Not on file    Highest education level: Not on file   Occupational History    Not on file   Tobacco Use    Smoking status: Not on file    Smokeless tobacco: Not on file   Substance and Sexual Activity    Alcohol use: Not on file    Drug use: Not on file    Sexual activity: Not on file   Other Topics Concern    Not on file   Social History Narrative    Not on file     Social Determinants of Health     Financial Resource Strain: Not on file   Food Insecurity: Not on file   Transportation Needs: Not on file   Physical Activity: Not on file   Stress: Not on file   Social Connections: Not on file   Intimate Partner Violence: Not on file   Housing Stability: Not on file       Vital signs: 170/79, 98.2, 64, 17, 186.0, 99%    Objective   Physical Exam  Vitals reviewed.   Constitutional:       Appearance: Normal appearance.   HENT:      Head: Normocephalic and atraumatic.   Cardiovascular:      Rate and Rhythm: Normal rate and regular rhythm.   Pulmonary:      Effort: Pulmonary effort is normal.      Breath sounds: Normal breath sounds.   Abdominal:      General: Bowel sounds are normal.      Palpations: Abdomen is soft.   Musculoskeletal:      Cervical back: Neck supple.   Skin:     General: Skin is warm and dry.   Neurological:      General: No focal deficit present.      Mental Status: He is alert.   Psychiatric:         Mood and Affect: Mood normal.          Behavior: Behavior is cooperative.         Assessment/Plan   Problem List Items Addressed This Visit       Primary hypertension - Primary    Diastolic dysfunction    TRIPLETT (dyspnea on exertion)    ESRD (end stage renal disease) on dialysis (CMS/HCC)    Hepatic cirrhosis (CMS/HCC)    Hyperlipidemia    Obstructive chronic bronchitis without exacerbation (CMS/HCC)    PAD (peripheral artery disease) (CMS/[facility])    Status post fall     Medications, treatments, and labs reviewed  Continue medications and treatments as listed in PCC    Scribe Attestation  I, Ulises Bagleyibshani   attest that this documentation has been prepared under the direction and in the presence of Caleb Mcdaniel MD.    Provider Attestation - Scribe documentation  All medical record entries made by the Scribe were at my direction and personally dictated by me. I have reviewed the chart and agree that the record accurately reflects my personal performance of the history, physical exam, discussion and plan.    Caleb Mcdaniel MD

## 2023-09-15 ENCOUNTER — NURSING HOME VISIT (OUTPATIENT)
Dept: POST ACUTE CARE | Facility: EXTERNAL LOCATION | Age: 73
End: 2023-09-15
Payer: COMMERCIAL

## 2023-09-15 DIAGNOSIS — R53.1 WEAKNESS: ICD-10-CM

## 2023-09-15 DIAGNOSIS — E11.9 TYPE 2 DIABETES MELLITUS WITHOUT COMPLICATION, WITH LONG-TERM CURRENT USE OF INSULIN (MULTI): Primary | ICD-10-CM

## 2023-09-15 DIAGNOSIS — Z79.4 TYPE 2 DIABETES MELLITUS WITHOUT COMPLICATION, WITH LONG-TERM CURRENT USE OF INSULIN (MULTI): Primary | ICD-10-CM

## 2023-09-15 DIAGNOSIS — J44.89 OBSTRUCTIVE CHRONIC BRONCHITIS WITHOUT EXACERBATION (MULTI): ICD-10-CM

## 2023-09-15 DIAGNOSIS — I11.9 HYPERTENSIVE HEART DISEASE WITHOUT CONGESTIVE HEART FAILURE: ICD-10-CM

## 2023-09-15 DIAGNOSIS — I10 PRIMARY HYPERTENSION: ICD-10-CM

## 2023-09-15 PROCEDURE — 99309 SBSQ NF CARE MODERATE MDM 30: CPT

## 2023-09-15 NOTE — LETTER
Patient: Albert Schwartz  : 1950    Encounter Date: 09/15/2023    PROGRESS NOTE    Subjective  Chief complaint: Albert Schwartz is a 72 y.o. male who is an acute skilled patient being seen and evaluated for weakness    HPI:  Patient  states doing well today.  Offers no complaints of F/C/N/V/D, SOB, cough. Continues to work with therapy for strength and endurance, gait training with FWW/CGA, stair training 6 steps ascending/descending with rails/CGA. DM stable- BG 95 no S/S hyper/hypoglycemia.  Appetite stable.  No concerns per nursing.          Objective  Vital signs:  133/88-98.8-70-19-98%    Physical Exam  Constitutional:       Appearance: Normal appearance.   HENT:      Head: Normocephalic.      Mouth/Throat:      Mouth: Mucous membranes are moist.   Eyes:      Extraocular Movements: Extraocular movements intact.   Cardiovascular:      Rate and Rhythm: Normal rate and regular rhythm.      Pulses: Normal pulses.      Heart sounds: Normal heart sounds.   Pulmonary:      Effort: Pulmonary effort is normal.      Breath sounds: Normal breath sounds.   Abdominal:      General: Bowel sounds are normal.      Palpations: Abdomen is soft.   Musculoskeletal:         General: Normal range of motion.      Cervical back: Normal range of motion and neck supple.      Comments:   Generalized weakness   Skin:     General: Skin is warm and dry.      Capillary Refill: Capillary refill takes less than 2 seconds.   Neurological:      Mental Status: He is alert. Mental status is at baseline.   Psychiatric:         Mood and Affect: Mood normal.         Behavior: Behavior normal.         Assessment/Plan  Problem List Items Addressed This Visit       Primary hypertension      stable    BP within goal   losartan metoprolol   monitor         Hypertensive heart disease without congestive heart failure      Stable   BP within goal   losartan metoprolol   monitor         Obstructive chronic bronchitis without exacerbation (CMS/[facility])       Stable   no SOB, cough, wheeze   monitor         Diabetes mellitus (CMS/[facility]) - Primary      Stable   BG 95   no S/S hyper or hypoglycemia   Insulin Degludec, aspart   monitor BG   routine HG A1c         Weakness      therapy          Medications, treatments, and labs reviewed  Continue medications and treatments as listed in EMR    YESSY Ruano      Electronically Signed By: YESSY Ruano   9/23/23 10:21 PM

## 2023-09-19 ENCOUNTER — NURSING HOME VISIT (OUTPATIENT)
Dept: POST ACUTE CARE | Facility: EXTERNAL LOCATION | Age: 73
End: 2023-09-19
Payer: COMMERCIAL

## 2023-09-19 ENCOUNTER — DOCUMENTATION (OUTPATIENT)
Dept: POST ACUTE CARE | Facility: EXTERNAL LOCATION | Age: 73
End: 2023-09-19

## 2023-09-19 DIAGNOSIS — R53.1 WEAKNESS: ICD-10-CM

## 2023-09-19 DIAGNOSIS — T81.718S IATROGENIC PULMONARY EMBOLISM AND INFARCTION, SEQUELA (MULTI): ICD-10-CM

## 2023-09-19 DIAGNOSIS — I26.99 IATROGENIC PULMONARY EMBOLISM AND INFARCTION, SEQUELA (MULTI): ICD-10-CM

## 2023-09-19 DIAGNOSIS — I10 PRIMARY HYPERTENSION: ICD-10-CM

## 2023-09-19 PROBLEM — E11.9 DIABETES MELLITUS (MULTI): Status: ACTIVE | Noted: 2023-09-19

## 2023-09-19 PROBLEM — T81.718A IATROGENIC PULMONARY EMBOLISM AND INFARCTION (MULTI): Status: ACTIVE | Noted: 2023-09-19

## 2023-09-19 PROCEDURE — 99308 SBSQ NF CARE LOW MDM 20: CPT | Performed by: INTERNAL MEDICINE

## 2023-09-19 NOTE — PROGRESS NOTES
Therapy update:  9/11/2023: FWW/ 70'/ min A-CGA/, Transfer training to increase functional task  performance/ CGA. Bed mobility training to increase functional task performance/ sit to supine / SBA    9/17/23 : Gait Training: Even surfaces, 75 feet, using RW, with CGA, Verbal cues, 1 repetitions,. Gait Training: Uneven  surfaces, 10 feet, using RW, with CG/Fawn, Verbal cues, 1 repetitions, To correct sequencing of gait with AD and To  increase safety. 6 steps B handrails CGA/ min A

## 2023-09-19 NOTE — PROGRESS NOTES
PROGRESS NOTE    Subjective   Chief complaint: Albert Schwartz is a 72 y.o. male who is an acute skilled patient being seen and evaluated for weakness    HPI:  Patient has been working in therapy to improve strength, endurance, and ADLs.  Patient continues to work toward goals. Patient ascended\descended 6 steps. Ambulating with FWW with CGA. 15 minutes of OMNICYCLE ROM on level 2. No new concerns today.  Denies n/v/f/c pain.        Objective   Vital signs: 61P, 18R,98.2,94%, BS94    Physical Exam  Vitals reviewed.   Constitutional:       Appearance: Normal appearance.   HENT:      Head: Normocephalic and atraumatic.   Cardiovascular:      Rate and Rhythm: Normal rate and regular rhythm.   Pulmonary:      Effort: Pulmonary effort is normal.      Breath sounds: Normal breath sounds.   Abdominal:      General: Bowel sounds are normal.      Palpations: Abdomen is soft.   Musculoskeletal:      Cervical back: Neck supple.   Skin:     General: Skin is warm and dry.   Neurological:      General: No focal deficit present.      Mental Status: He is alert.   Psychiatric:         Mood and Affect: Mood normal.         Behavior: Behavior is cooperative.         Assessment/Plan   Problem List Items Addressed This Visit       Primary hypertension     Continue meds  Monitor BP         Weakness     Continue therapy         Iatrogenic pulmonary embolism and infarction (CMS/[facility])     Anticoagulant  Bleeding precautions          Medications, treatments, and labs reviewed  Continue medications and treatments as listed in EMR    Scribe Attestation  I, Rich Cervantes   attest that this documentation has been prepared under the direction and in the presence of Caleb Mcdaniel MD.     Provider Attestation - Scribe documentation  All medical record entries made by the Scribe were at my direction and personally dictated by me. I have reviewed the chart and agree that the record accurately reflects my personal performance of the history,  physical exam, discussion and plan.   Caleb Mcdaniel MD

## 2023-09-19 NOTE — LETTER
Patient: Albert Schwartz  : 1950    Encounter Date: 2023    PROGRESS NOTE    Subjective  Chief complaint: Albert Schwartz is a 72 y.o. male who is an acute skilled patient being seen and evaluated for weakness    HPI:  Patient has been working in therapy to improve strength, endurance, and ADLs.  Patient continues to work toward goals. Patient ascended\descended 6 steps. Ambulating with FWW with CGA. 15 minutes of OMNICYCLE ROM on level 2. No new concerns today.  Denies n/v/f/c pain.        Objective  Vital signs: 61P, 18R,98.2,94%, BS94    Physical Exam  Vitals reviewed.   Constitutional:       Appearance: Normal appearance.   HENT:      Head: Normocephalic and atraumatic.   Cardiovascular:      Rate and Rhythm: Normal rate and regular rhythm.   Pulmonary:      Effort: Pulmonary effort is normal.      Breath sounds: Normal breath sounds.   Abdominal:      General: Bowel sounds are normal.      Palpations: Abdomen is soft.   Musculoskeletal:      Cervical back: Neck supple.   Skin:     General: Skin is warm and dry.   Neurological:      General: No focal deficit present.      Mental Status: He is alert.   Psychiatric:         Mood and Affect: Mood normal.         Behavior: Behavior is cooperative.         Assessment/Plan  Problem List Items Addressed This Visit       Primary hypertension     Continue meds  Monitor BP         Weakness     Continue therapy         Iatrogenic pulmonary embolism and infarction (CMS/HCC)     Anticoagulant  Bleeding precautions          Medications, treatments, and labs reviewed  Continue medications and treatments as listed in EMR    Scribe Attestation  Malika PAEZ Scribe   attest that this documentation has been prepared under the direction and in the presence of Caleb Mcdaniel MD.     Provider Attestation - Scribe documentation  All medical record entries made by the Scribe were at my direction and personally dictated by me. I have reviewed the chart and agree that  the record accurately reflects my personal performance of the history, physical exam, discussion and plan.   Caleb Mcdaniel MD      Electronically Signed By: Caleb Mcdaniel MD   9/19/23  4:46 PM

## 2023-09-20 ENCOUNTER — NURSING HOME VISIT (OUTPATIENT)
Dept: POST ACUTE CARE | Facility: EXTERNAL LOCATION | Age: 73
End: 2023-09-20
Payer: COMMERCIAL

## 2023-09-20 DIAGNOSIS — Z99.2 TYPE 2 DIABETES MELLITUS WITH CHRONIC KIDNEY DISEASE ON CHRONIC DIALYSIS, UNSPECIFIED WHETHER LONG TERM INSULIN USE (MULTI): ICD-10-CM

## 2023-09-20 DIAGNOSIS — E11.22 TYPE 2 DIABETES MELLITUS WITH CHRONIC KIDNEY DISEASE ON CHRONIC DIALYSIS, UNSPECIFIED WHETHER LONG TERM INSULIN USE (MULTI): ICD-10-CM

## 2023-09-20 DIAGNOSIS — N18.6 TYPE 2 DIABETES MELLITUS WITH CHRONIC KIDNEY DISEASE ON CHRONIC DIALYSIS, UNSPECIFIED WHETHER LONG TERM INSULIN USE (MULTI): ICD-10-CM

## 2023-09-20 DIAGNOSIS — R53.1 WEAKNESS: Primary | ICD-10-CM

## 2023-09-20 DIAGNOSIS — I10 HYPERTENSION, ESSENTIAL: ICD-10-CM

## 2023-09-20 PROCEDURE — 99309 SBSQ NF CARE MODERATE MDM 30: CPT | Performed by: NURSE PRACTITIONER

## 2023-09-20 NOTE — LETTER
Patient: Albert Schwartz  : 1950    Encounter Date: 2023    PROGRESS NOTE    Subjective  Chief complaint: Albert Schwartz is a 72 y.o. male who is a acute skilled care patient being seen and evaluated for weakness.    HPI:  HPI  Patient continues to work in therapy.  Working on strengthening exercises/activities, gait training, transfers and ADLs.  Patient is ambulating 75 feet on even surfaces using rolling walker with contact-guard assist and verbal cues.  Also able to ascend/descend 6 stairs using both handrails and contact-guard/minimal assist.  Patient is stable without complaint.  Denies n/v/f/c.  No new concerns reported by staff.    Objective  Vital signs:   16, 166/84, 98.1, 65, 95%,   Physical Exam  Constitutional:       Appearance: Normal appearance.   HENT:      Head: Normocephalic.      Mouth/Throat:      Mouth: Mucous membranes are moist.   Eyes:      Extraocular Movements: Extraocular movements intact.   Cardiovascular:      Rate and Rhythm: Normal rate and regular rhythm.      Pulses: Normal pulses.      Heart sounds: Normal heart sounds.   Pulmonary:      Effort: Pulmonary effort is normal.      Breath sounds: Normal breath sounds.   Abdominal:      General: Bowel sounds are normal.      Palpations: Abdomen is soft.   Musculoskeletal:         General: Normal range of motion.      Cervical back: Normal range of motion and neck supple.      Right lower leg: Edema present.      Left lower leg: Edema present.      Comments:   Generalized weakness   Skin:     General: Skin is warm and dry.      Capillary Refill: Capillary refill takes less than 2 seconds.   Neurological:      Mental Status: He is alert. Mental status is at baseline.   Psychiatric:         Mood and Affect: Mood normal.         Behavior: Behavior normal.         Assessment/Plan  Problem List Items Addressed This Visit       Hypertension, essential      losartan    metoprolol   Monitor bp         Type 2 diabetes mellitus with  chronic kidney disease on chronic dialysis (CMS/[facility])      Stable   FBG at goal   no S/S hyper or hypoglycemia   Insulin Degludec, aspart   monitor BG           Weakness - Primary     Making progress.  Continue to work with therapy          Medications, treatments, and labs reviewed  Continue medications and treatments as listed in EMR    Scribe Attestation  Mariella PAEZ Scribe   attest that this documentation has been prepared under the direction and in the presence of YESSY Sarah    Provider Attestation - Scribe documentation  All medical record entries made by the Scribe were at my direction and personally dictated by me. I have reviewed the chart and agree that the record accurately reflects my personal performance of the history, physical exam, discussion and plan.   YESSY Sarah        Electronically Signed By: YESSY Sarah   9/27/23  6:30 PM

## 2023-09-21 ENCOUNTER — NURSING HOME VISIT (OUTPATIENT)
Dept: POST ACUTE CARE | Facility: EXTERNAL LOCATION | Age: 73
End: 2023-09-21
Payer: COMMERCIAL

## 2023-09-21 DIAGNOSIS — I10 PRIMARY HYPERTENSION: ICD-10-CM

## 2023-09-21 DIAGNOSIS — Z99.2 ESRD (END STAGE RENAL DISEASE) ON DIALYSIS (MULTI): ICD-10-CM

## 2023-09-21 DIAGNOSIS — R53.1 WEAKNESS: Primary | ICD-10-CM

## 2023-09-21 DIAGNOSIS — N18.6 ESRD (END STAGE RENAL DISEASE) ON DIALYSIS (MULTI): ICD-10-CM

## 2023-09-21 PROCEDURE — 99308 SBSQ NF CARE LOW MDM 20: CPT | Performed by: INTERNAL MEDICINE

## 2023-09-21 NOTE — LETTER
Patient: Albert Schwartz  : 1950    Encounter Date: 2023    PROGRESS NOTE    Subjective  Chief complaint: Albert Schwartz is a 72 y.o. male who is an acute skilled patient being seen and evaluated for weakness    HPI:   patient in therapy d/t generalized weakness.  Patient presents for f/u.  Continues to work toward goals in therapy. Gait Training: Even surfaces, 75 feet, using RW, with CGA, Verbal cues, 1 repetitions,. Gait Training: Uneven  surfaces, 10 feet, using RW, with CG/Fawn, Verbal cues, 1 repetitions, To correct sequencing of gait with AD and To  increase safety. 6 steps B handrails CGA/ min A    patient with ESRD receiving HD tolerating it well.  Denies chest pain or headache. No new complaints at this time.       Objective  Vital signs:   134/72, 98%    Physical Exam  Constitutional:       General: He is not in acute distress.  Eyes:      Extraocular Movements: Extraocular movements intact.   Cardiovascular:      Rate and Rhythm: Normal rate and regular rhythm.   Pulmonary:      Effort: Pulmonary effort is normal.      Breath sounds: Normal breath sounds.   Abdominal:      General: Bowel sounds are normal.      Palpations: Abdomen is soft.   Musculoskeletal:      Cervical back: Neck supple.      Right lower leg: No edema.      Left lower leg: No edema.   Neurological:      Mental Status: He is alert.   Psychiatric:         Mood and Affect: Mood normal.         Behavior: Behavior is cooperative.         Assessment/Plan  Problem List Items Addressed This Visit       Primary hypertension     Continue meds  Monitor BP         ESRD (end stage renal disease) on dialysis (CMS/[facility])       Continue HD per nephrology         Weakness - Primary     Continue therapy          Medications, treatments, and labs reviewed  Continue medications and treatments as listed in [facility]    Scribe Attestation  By signing my name below, IBlanca, Rich   attest that this documentation has been prepared under the  direction and in the presence of Caleb Mcdaniel MD.    Provider Attestation - Scribe documentation  All medical record entries made by the Scribe were at my direction and personally dictated by me. I have reviewed the chart and agree that the record accurately reflects my personal performance of the history, physical exam, discussion and plan.  1. Weakness        2. Primary hypertension        3. ESRD (end stage renal disease) on dialysis (CMS/[facility])              Electronically Signed By: Caleb Mcdaniel MD   9/21/23  3:18 PM

## 2023-09-21 NOTE — PROGRESS NOTES
PROGRESS NOTE    Subjective   Chief complaint: Albert Schwartz is a 72 y.o. male who is an acute skilled patient being seen and evaluated for weakness    HPI:   patient in therapy d/t generalized weakness.  Patient presents for f/u.  Continues to work toward goals in therapy. Gait Training: Even surfaces, 75 feet, using RW, with CGA, Verbal cues, 1 repetitions,. Gait Training: Uneven  surfaces, 10 feet, using RW, with CG/Fawn, Verbal cues, 1 repetitions, To correct sequencing of gait with AD and To  increase safety. 6 steps B handrails CGA/ min A    patient with ESRD receiving HD tolerating it well.  Denies chest pain or headache. No new complaints at this time.       Objective   Vital signs:   134/72, 98%    Physical Exam  Constitutional:       General: He is not in acute distress.  Eyes:      Extraocular Movements: Extraocular movements intact.   Cardiovascular:      Rate and Rhythm: Normal rate and regular rhythm.   Pulmonary:      Effort: Pulmonary effort is normal.      Breath sounds: Normal breath sounds.   Abdominal:      General: Bowel sounds are normal.      Palpations: Abdomen is soft.   Musculoskeletal:      Cervical back: Neck supple.      Right lower leg: No edema.      Left lower leg: No edema.   Neurological:      Mental Status: He is alert.   Psychiatric:         Mood and Affect: Mood normal.         Behavior: Behavior is cooperative.         Assessment/Plan   Problem List Items Addressed This Visit       Primary hypertension     Continue meds  Monitor BP         ESRD (end stage renal disease) on dialysis (CMS/[facility])       Continue HD per nephrology         Weakness - Primary     Continue therapy          Medications, treatments, and labs reviewed  Continue medications and treatments as listed in [facility]    Scribe Attestation  By signing my name below, IBlanca, Scribe   attest that this documentation has been prepared under the direction and in the presence of Caleb Mcdaniel MD.    Provider  Attestation - Scribe documentation  All medical record entries made by the Scribe were at my direction and personally dictated by me. I have reviewed the chart and agree that the record accurately reflects my personal performance of the history, physical exam, discussion and plan.  1. Weakness        2. Primary hypertension        3. ESRD (end stage renal disease) on dialysis (CMS/[facility])

## 2023-09-22 ENCOUNTER — NURSING HOME VISIT (OUTPATIENT)
Dept: POST ACUTE CARE | Facility: EXTERNAL LOCATION | Age: 73
End: 2023-09-22
Payer: COMMERCIAL

## 2023-09-22 DIAGNOSIS — R53.1 WEAKNESS: Primary | ICD-10-CM

## 2023-09-22 DIAGNOSIS — I10 PRIMARY HYPERTENSION: ICD-10-CM

## 2023-09-22 DIAGNOSIS — Z79.4 TYPE 2 DIABETES MELLITUS WITHOUT COMPLICATION, WITH LONG-TERM CURRENT USE OF INSULIN (MULTI): ICD-10-CM

## 2023-09-22 DIAGNOSIS — E11.9 TYPE 2 DIABETES MELLITUS WITHOUT COMPLICATION, WITH LONG-TERM CURRENT USE OF INSULIN (MULTI): ICD-10-CM

## 2023-09-22 DIAGNOSIS — I11.9 HYPERTENSIVE HEART DISEASE WITHOUT CONGESTIVE HEART FAILURE: ICD-10-CM

## 2023-09-22 DIAGNOSIS — J44.89 OBSTRUCTIVE CHRONIC BRONCHITIS WITHOUT EXACERBATION (MULTI): ICD-10-CM

## 2023-09-22 PROCEDURE — 99309 SBSQ NF CARE MODERATE MDM 30: CPT

## 2023-09-22 NOTE — LETTER
Patient: Albert Schwartz  : 1950    Encounter Date: 2023    PROGRESS NOTE    Subjective  Chief complaint: Albert Schwartz is a 72 y.o. male who is an acute skilled patient being seen and evaluated for weakness    HPI:  Patient states doing well today.  Continues to work with therapy on gait training.  Ambulated 105 feet using RW with CGAx2.  Required verbal and tactile cues. .  Discussed , with diet and medications.  No S/S hyper or hypoglycemia.  No concerns per nursing          Objective  Vital signs:   149/88-97.9-69-16-96%    Physical Exam  Constitutional:       Appearance: Normal appearance.   HENT:      Head: Normocephalic.      Mouth/Throat:      Mouth: Mucous membranes are moist.   Eyes:      Extraocular Movements: Extraocular movements intact.   Cardiovascular:      Rate and Rhythm: Normal rate and regular rhythm.      Pulses: Normal pulses.      Heart sounds: Normal heart sounds.   Pulmonary:      Effort: Pulmonary effort is normal.      Breath sounds: Normal breath sounds.   Abdominal:      General: Bowel sounds are normal.      Palpations: Abdomen is soft.   Musculoskeletal:         General: Normal range of motion.      Cervical back: Normal range of motion and neck supple.      Comments:  generalized weakness   Skin:     General: Skin is warm and dry.      Capillary Refill: Capillary refill takes less than 2 seconds.   Neurological:      Mental Status: He is alert and oriented to person, place, and time. Mental status is at baseline.   Psychiatric:         Mood and Affect: Mood normal.         Behavior: Behavior normal.         Assessment/Plan  Problem List Items Addressed This Visit       Primary hypertension      stable    BP within goal   losartan metoprolol   monitor         Hypertensive heart disease without congestive heart failure      Stable   BP within goal   losartan metoprolol   monitor         Obstructive chronic bronchitis without exacerbation (CMS/HCC)      Stable   no SOB,  cough, wheeze   monitor         Diabetes mellitus (CMS/[facility])      Stable      no S/S hyper or hypoglycemia   Insulin Degludec, aspart   monitor BG   routine HG A1c         Weakness - Primary      therapy          Medications, treatments, and labs reviewed  Continue medications and treatments as listed in EMR    YESSY Ruano      Electronically Signed By: YESSY Ruano   9/25/23 11:16 AM

## 2023-09-23 ENCOUNTER — DOCUMENTATION (OUTPATIENT)
Dept: POST ACUTE CARE | Facility: EXTERNAL LOCATION | Age: 73
End: 2023-09-23
Payer: COMMERCIAL

## 2023-09-23 NOTE — PROGRESS NOTES
Therapy update:  9/11/2023: FWW/ 70'/ min A-CGA/, Transfer training to increase functional task  performance/ CGA. Bed mobility training to increase functional task performance/ sit to supine / SBA    9/17/23 : Gait Training: Even surfaces, 75 feet, using RW, with CGA, Verbal cues, 1 repetitions,. Gait Training: Uneven  surfaces, 10 feet, using RW, with CG/Fawn, Verbal cues, 1 repetitions, To correct sequencing of gait with AD and To  increase safety. 6 steps B handrails CGA/ min A     9/22/23 Stair training to increase functional task performance/ 12 stairs / ascending and descending/ 2 rails/ requires  increased time/ SBA. Gait training to increase functional task performance/ CGA / FWW/ 150'/ cues for safe  negotiation of obstacles secondary to vision deficits.

## 2023-09-24 NOTE — PROGRESS NOTES
PROGRESS NOTE    Subjective   Chief complaint: Albert Schwartz is a 72 y.o. male who is an acute skilled patient being seen and evaluated for weakness    HPI:  Patient  states doing well today.  Offers no complaints of F/C/N/V/D, SOB, cough. Continues to work with therapy for strength and endurance, gait training with FWW/CGA, stair training 6 steps ascending/descending with rails/CGA. DM stable- BG 95 no S/S hyper/hypoglycemia.  Appetite stable.  No concerns per nursing.          Objective   Vital signs:  133/88-98.8-70-19-98%    Physical Exam  Constitutional:       Appearance: Normal appearance.   HENT:      Head: Normocephalic.      Mouth/Throat:      Mouth: Mucous membranes are moist.   Eyes:      Extraocular Movements: Extraocular movements intact.   Cardiovascular:      Rate and Rhythm: Normal rate and regular rhythm.      Pulses: Normal pulses.      Heart sounds: Normal heart sounds.   Pulmonary:      Effort: Pulmonary effort is normal.      Breath sounds: Normal breath sounds.   Abdominal:      General: Bowel sounds are normal.      Palpations: Abdomen is soft.   Musculoskeletal:         General: Normal range of motion.      Cervical back: Normal range of motion and neck supple.      Comments:   Generalized weakness   Skin:     General: Skin is warm and dry.      Capillary Refill: Capillary refill takes less than 2 seconds.   Neurological:      Mental Status: He is alert. Mental status is at baseline.   Psychiatric:         Mood and Affect: Mood normal.         Behavior: Behavior normal.         Assessment/Plan   Problem List Items Addressed This Visit       Primary hypertension      stable    BP within goal   losartan metoprolol   monitor         Hypertensive heart disease without congestive heart failure      Stable   BP within goal   losartan metoprolol   monitor         Obstructive chronic bronchitis without exacerbation (CMS/HCC)      Stable   no SOB, cough, wheeze   monitor         Diabetes mellitus  (CMS/[facility]) - Primary      Stable   BG 95   no S/S hyper or hypoglycemia   Insulin Degludec, aspart   monitor BG   routine HG A1c         Weakness      therapy          Medications, treatments, and labs reviewed  Continue medications and treatments as listed in EMR    RODRICK Ruano-CNP

## 2023-09-24 NOTE — ASSESSMENT & PLAN NOTE
Stable   BG 95   no S/S hyper or hypoglycemia   Insulin Degludec, aspart   monitor BG   routine HG A1c

## 2023-09-25 ENCOUNTER — NURSING HOME VISIT (OUTPATIENT)
Dept: POST ACUTE CARE | Facility: EXTERNAL LOCATION | Age: 73
End: 2023-09-25
Payer: COMMERCIAL

## 2023-09-25 DIAGNOSIS — I50.32 CHRONIC DIASTOLIC CONGESTIVE HEART FAILURE (MULTI): Primary | ICD-10-CM

## 2023-09-25 DIAGNOSIS — R53.1 WEAKNESS: ICD-10-CM

## 2023-09-25 DIAGNOSIS — I10 HYPERTENSION, ESSENTIAL: ICD-10-CM

## 2023-09-25 DIAGNOSIS — E11.319 TYPE 2 DIABETES MELLITUS WITH RETINOPATHY, MACULAR EDEMA PRESENCE UNSPECIFIED, UNSPECIFIED LATERALITY, UNSPECIFIED RETINOPATHY SEVERITY, UNSPECIFIED WHETHER LONG TERM INSULIN USE (MULTI): ICD-10-CM

## 2023-09-25 PROCEDURE — 99309 SBSQ NF CARE MODERATE MDM 30: CPT | Performed by: NURSE PRACTITIONER

## 2023-09-25 NOTE — Clinical Note
Patient: Albert Schwartz  : 1950    Encounter Date: 2023    PROGRESS NOTE    Subjective  Chief complaint: Albert Schwartz is a 72 y.o. male who is a acute skilled care patient being seen and evaluated for weakness.    HPI:  HPI    Patient is skilled for therapy due to weakness after recent hospitalization.  Continues to work towards goals. Therapy working with the patient on stair training and gait training.  Patient ascended/descended 12 steps with both handrails and standby assist.  Ambulated 150 feet with front wheel walker and contact-guard assist.  Patient has no complaints at this time.  No new concerns reported by nursing.    Objective  Vital signs:   16, 147/85, 97.7, 61, 95%,   Physical Exam  Constitutional:       Appearance: Normal appearance.   HENT:      Head: Normocephalic.      Mouth/Throat:      Mouth: Mucous membranes are moist.   Eyes:      Extraocular Movements: Extraocular movements intact.   Cardiovascular:      Rate and Rhythm: Normal rate and regular rhythm.      Pulses: Normal pulses.      Heart sounds: Normal heart sounds.   Pulmonary:      Effort: Pulmonary effort is normal.      Breath sounds: Normal breath sounds.   Abdominal:      General: Bowel sounds are normal.      Palpations: Abdomen is soft.   Musculoskeletal:         General: Normal range of motion.      Cervical back: Normal range of motion and neck supple.      Comments:   Generalized weakness   Skin:     General: Skin is warm and dry.      Capillary Refill: Capillary refill takes less than 2 seconds.   Neurological:      Mental Status: He is alert. Mental status is at baseline.   Psychiatric:         Mood and Affect: Mood normal.         Behavior: Behavior normal.         Assessment/Plan  Problem List Items Addressed This Visit    None    Medications, treatments, and labs reviewed  Continue medications and treatments as listed in EMR    Scribe Attestation  I, Rich Leonardo   attest that this documentation  has been prepared under the direction and in the presence of YESSY Sarah    Provider Attestation - Scribe documentation  All medical record entries made by the Scribe were at my direction and personally dictated by me. I have reviewed the chart and agree that the record accurately reflects my personal performance of the history, physical exam, discussion and plan.   YESSY Sarah          Electronically Signed By: YESSY Sarah   9/30/23  6:15 PM

## 2023-09-25 NOTE — PROGRESS NOTES
PROGRESS NOTE    Subjective   Chief complaint: Albert Schwartz is a 72 y.o. male who is a acute skilled care patient being seen and evaluated for weakness.    HPI:  HPI  Patient continues to work in therapy.  Working on strengthening exercises/activities, gait training, transfers and ADLs.  Patient is ambulating 75 feet on even surfaces using rolling walker with contact-guard assist and verbal cues.  Also able to ascend/descend 6 stairs using both handrails and contact-guard/minimal assist.  Patient is stable without complaint.  Denies n/v/f/c.  No new concerns reported by staff.    Objective   Vital signs:   16, 166/84, 98.1, 65, 95%,   Physical Exam  Constitutional:       Appearance: Normal appearance.   HENT:      Head: Normocephalic.      Mouth/Throat:      Mouth: Mucous membranes are moist.   Eyes:      Extraocular Movements: Extraocular movements intact.   Cardiovascular:      Rate and Rhythm: Normal rate and regular rhythm.      Pulses: Normal pulses.      Heart sounds: Normal heart sounds.   Pulmonary:      Effort: Pulmonary effort is normal.      Breath sounds: Normal breath sounds.   Abdominal:      General: Bowel sounds are normal.      Palpations: Abdomen is soft.   Musculoskeletal:         General: Normal range of motion.      Cervical back: Normal range of motion and neck supple.      Right lower leg: Edema present.      Left lower leg: Edema present.      Comments:   Generalized weakness   Skin:     General: Skin is warm and dry.      Capillary Refill: Capillary refill takes less than 2 seconds.   Neurological:      Mental Status: He is alert. Mental status is at baseline.   Psychiatric:         Mood and Affect: Mood normal.         Behavior: Behavior normal.         Assessment/Plan   Problem List Items Addressed This Visit       Hypertension, essential      losartan    metoprolol   Monitor bp         Type 2 diabetes mellitus with chronic kidney disease on chronic dialysis (CMS/[facility])      Stable    FBG at goal   no S/S hyper or hypoglycemia   Insulin Degludec, aspart   monitor BG           Weakness - Primary     Making progress.  Continue to work with therapy          Medications, treatments, and labs reviewed  Continue medications and treatments as listed in EMR    Scribe Attestation  IMariella Scribe   attest that this documentation has been prepared under the direction and in the presence of YESSY Sarah    Provider Attestation - Scribe documentation  All medical record entries made by the Scribe were at my direction and personally dictated by me. I have reviewed the chart and agree that the record accurately reflects my personal performance of the history, physical exam, discussion and plan.   YESSY Sarah

## 2023-09-25 NOTE — ASSESSMENT & PLAN NOTE
Stable      no S/S hyper or hypoglycemia   Insulin Degludec, aspart   monitor BG   routine HG A1c

## 2023-09-25 NOTE — PROGRESS NOTES
PROGRESS NOTE    Subjective   Chief complaint: Albert Schwartz is a 72 y.o. male who is an acute skilled patient being seen and evaluated for weakness    HPI:  Patient states doing well today.  Continues to work with therapy on gait training.  Ambulated 105 feet using RW with CGAx2.  Required verbal and tactile cues. .  Discussed , with diet and medications.  No S/S hyper or hypoglycemia.  No concerns per nursing          Objective   Vital signs:   149/88-97.9-69-16-96%    Physical Exam  Constitutional:       Appearance: Normal appearance.   HENT:      Head: Normocephalic.      Mouth/Throat:      Mouth: Mucous membranes are moist.   Eyes:      Extraocular Movements: Extraocular movements intact.   Cardiovascular:      Rate and Rhythm: Normal rate and regular rhythm.      Pulses: Normal pulses.      Heart sounds: Normal heart sounds.   Pulmonary:      Effort: Pulmonary effort is normal.      Breath sounds: Normal breath sounds.   Abdominal:      General: Bowel sounds are normal.      Palpations: Abdomen is soft.   Musculoskeletal:         General: Normal range of motion.      Cervical back: Normal range of motion and neck supple.      Comments:  generalized weakness   Skin:     General: Skin is warm and dry.      Capillary Refill: Capillary refill takes less than 2 seconds.   Neurological:      Mental Status: He is alert and oriented to person, place, and time. Mental status is at baseline.   Psychiatric:         Mood and Affect: Mood normal.         Behavior: Behavior normal.         Assessment/Plan   Problem List Items Addressed This Visit       Primary hypertension      stable    BP within goal   losartan metoprolol   monitor         Hypertensive heart disease without congestive heart failure      Stable   BP within goal   losartan metoprolol   monitor         Obstructive chronic bronchitis without exacerbation (CMS/HCC)      Stable   no SOB, cough, wheeze   monitor         Diabetes mellitus (CMS/HCC)       Stable      no S/S hyper or hypoglycemia   Insulin Degludec, aspart   monitor BG   routine HG A1c         Weakness - Primary      therapy          Medications, treatments, and labs reviewed  Continue medications and treatments as listed in EMR    RODRICK Ruano-CNP

## 2023-09-26 ENCOUNTER — NURSING HOME VISIT (OUTPATIENT)
Dept: POST ACUTE CARE | Facility: EXTERNAL LOCATION | Age: 73
End: 2023-09-26
Payer: COMMERCIAL

## 2023-09-26 DIAGNOSIS — I10 PRIMARY HYPERTENSION: ICD-10-CM

## 2023-09-26 DIAGNOSIS — Z79.4 TYPE 2 DIABETES MELLITUS WITHOUT COMPLICATION, WITH LONG-TERM CURRENT USE OF INSULIN (MULTI): ICD-10-CM

## 2023-09-26 DIAGNOSIS — E11.9 TYPE 2 DIABETES MELLITUS WITHOUT COMPLICATION, WITH LONG-TERM CURRENT USE OF INSULIN (MULTI): ICD-10-CM

## 2023-09-26 PROCEDURE — 99315 NF DSCHRG MGMT 30 MIN/LESS: CPT | Performed by: INTERNAL MEDICINE

## 2023-09-26 NOTE — PROGRESS NOTES
PROGRESS NOTE    Subjective   Chief complaint: Albert Schwartz is a 72 y.o. male who is an acute skilled patient being seen and evaluated for weakness    HPI:  Patient has completed therapy and been discharged. Patient will be discharging home with [facility] and family support. Patient is stable with no acute distress.       Objective   Vital signs: 145/78,65,96&    Physical Exam  Constitutional:       Appearance: Normal appearance.   HENT:      Head: Normocephalic.      Mouth/Throat:      Mouth: Mucous membranes are moist.   Eyes:      Extraocular Movements: Extraocular movements intact.   Cardiovascular:      Rate and Rhythm: Normal rate and regular rhythm.      Pulses: Normal pulses.      Heart sounds: Normal heart sounds.   Pulmonary:      Effort: Pulmonary effort is normal.      Breath sounds: Normal breath sounds.   Abdominal:      General: Bowel sounds are normal.      Palpations: Abdomen is soft.   Musculoskeletal:         General: Normal range of motion.      Cervical back: Normal range of motion and neck supple.      Comments:  generalized weakness   Skin:     General: Skin is warm and dry.      Capillary Refill: Capillary refill takes less than 2 seconds.   Neurological:      Mental Status: He is alert and oriented to person, place, and time. Mental status is at baseline.   Psychiatric:         Mood and Affect: Mood normal.         Behavior: Behavior normal.         Assessment/Plan   Problem List Items Addressed This Visit       Primary hypertension     Continue meds  Monitor BP         Diabetes mellitus (CMS/[facility])     Continue insuline  glucoscan          Medications, treatments, and labs reviewed  Continue medications and treatments as listed in EMR  Prognosis fair  Follow up with PCP within 2 weeks  Medications called into pharmacy by office  Condition at discharge is stable    Scribe Attestation  PHILIPPE, Rich Cervantes   attest that this documentation has been prepared under the direction and in the  presence of Caleb Mcdaniel MD.     Provider Attestation - Scribe documentation  All medical record entries made by the Scribe were at my direction and personally dictated by me. I have reviewed the chart and agree that the record accurately reflects my personal performance of the history, physical exam, discussion and plan.   Caleb Mcdaniel MD

## 2023-09-26 NOTE — LETTER
Patient: Albert Schwartz  : 1950    Encounter Date: 2023    PROGRESS NOTE    Subjective  Chief complaint: Albert Schwartz is a 72 y.o. male who is an acute skilled patient being seen and evaluated for weakness    HPI:  Patient has completed therapy and been discharged. Patient will be discharging home with [facility] and family support. Patient is stable with no acute distress.       Objective  Vital signs: 145/78,65,96&    Physical Exam  Constitutional:       Appearance: Normal appearance.   HENT:      Head: Normocephalic.      Mouth/Throat:      Mouth: Mucous membranes are moist.   Eyes:      Extraocular Movements: Extraocular movements intact.   Cardiovascular:      Rate and Rhythm: Normal rate and regular rhythm.      Pulses: Normal pulses.      Heart sounds: Normal heart sounds.   Pulmonary:      Effort: Pulmonary effort is normal.      Breath sounds: Normal breath sounds.   Abdominal:      General: Bowel sounds are normal.      Palpations: Abdomen is soft.   Musculoskeletal:         General: Normal range of motion.      Cervical back: Normal range of motion and neck supple.      Comments:  generalized weakness   Skin:     General: Skin is warm and dry.      Capillary Refill: Capillary refill takes less than 2 seconds.   Neurological:      Mental Status: He is alert and oriented to person, place, and time. Mental status is at baseline.   Psychiatric:         Mood and Affect: Mood normal.         Behavior: Behavior normal.         Assessment/Plan  Problem List Items Addressed This Visit       Primary hypertension     Continue meds  Monitor BP         Diabetes mellitus (CMS/[facility])     Continue insuline  glucoscan          Medications, treatments, and labs reviewed  Continue medications and treatments as listed in EMR  Prognosis fair  Follow up with PCP within 2 weeks  Medications called into pharmacy by office  Condition at discharge is stable    Scribe Attestation  I, Rich Cervantes   attest that this  documentation has been prepared under the direction and in the presence of Caleb Mcdaniel MD.     Provider Attestation - Scribe documentation  All medical record entries made by the Scribe were at my direction and personally dictated by me. I have reviewed the chart and agree that the record accurately reflects my personal performance of the history, physical exam, discussion and plan.   Caleb Mcdaniel MD      Electronically Signed By: Caleb Mcdaniel MD   9/26/23  3:57 PM

## 2023-09-27 PROBLEM — I10 HYPERTENSION, ESSENTIAL: Status: ACTIVE | Noted: 2023-09-14

## 2023-09-27 PROBLEM — Z99.2 TYPE 2 DIABETES MELLITUS WITH CHRONIC KIDNEY DISEASE ON CHRONIC DIALYSIS (MULTI): Status: ACTIVE | Noted: 2023-09-19

## 2023-09-27 PROBLEM — I10 PRIMARY HYPERTENSION: Status: RESOLVED | Noted: 2017-02-22 | Resolved: 2023-09-27

## 2023-09-27 PROBLEM — E11.22 TYPE 2 DIABETES MELLITUS WITH CHRONIC KIDNEY DISEASE ON CHRONIC DIALYSIS (MULTI): Status: ACTIVE | Noted: 2023-09-19

## 2023-09-27 PROBLEM — N18.6 TYPE 2 DIABETES MELLITUS WITH CHRONIC KIDNEY DISEASE ON CHRONIC DIALYSIS (MULTI): Status: ACTIVE | Noted: 2023-09-19

## 2023-09-27 PROBLEM — R06.09 DOE (DYSPNEA ON EXERTION): Status: RESOLVED | Noted: 2023-09-14 | Resolved: 2023-09-27

## 2023-09-28 NOTE — PROGRESS NOTES
PROGRESS NOTE    Subjective   Chief complaint: Albert Schwartz is a 72 y.o. male who is a acute skilled care patient being seen and evaluated for weakness.    HPI:  HPI    Patient is skilled for therapy due to weakness after recent hospitalization.  Continues to work towards goals. Therapy working with the patient on stair training and gait training.  Patient ascended/descended 12 steps with both handrails and standby assist.  Ambulated 150 feet with front wheel walker and contact-guard assist.  Patient has no complaints at this time.  No new concerns reported by nursing.    Objective   Vital signs:   16, 147/85, 97.7, 61, 95%,   Physical Exam  HENT:      Head: Normocephalic.   Cardiovascular:      Rate and Rhythm: Normal rate and regular rhythm.   Pulmonary:      Effort: Pulmonary effort is normal.      Breath sounds: Normal breath sounds.   Musculoskeletal:      Cervical back: Neck supple.      Comments:   Generalized weakness   Neurological:      Mental Status: He is alert.         Assessment/Plan   Problem List Items Addressed This Visit       Chronic diastolic congestive heart failure (CMS/HCC) - Primary     Stable  Monitor weight         Hypertension, essential     Continue antihypertensives  Monitor blood pressure         Type 2 diabetes mellitus (CMS/HCC)      Stable   FBG at goal   no S/S hyper or hypoglycemia   monitor BG           Weakness     Continue working with therapy          Medications, treatments, and labs reviewed  Continue medications and treatments as listed in EMR    Scribe Attestation  IMariella Scribe   attest that this documentation has been prepared under the direction and in the presence of RODRICK Sarah-CNP    Provider Attestation - Scribe documentation  All medical record entries made by the Scribe were at my direction and personally dictated by me. I have reviewed the chart and agree that the record accurately reflects my personal performance of the history,  physical exam, discussion and plan.   Yamila Gómez, APRN-CNP

## 2023-10-01 ENCOUNTER — PHARMACY VISIT (OUTPATIENT)
Dept: PHARMACY | Facility: CLINIC | Age: 73
End: 2023-10-01
Payer: MEDICARE

## 2023-10-03 PROBLEM — H52.203 ASTIGMATISM, BILATERAL: Status: ACTIVE | Noted: 2023-10-03

## 2023-10-03 PROBLEM — T82.9XXA COMPLICATION OF ARTERIOVENOUS DIALYSIS FISTULA: Status: ACTIVE | Noted: 2023-10-03

## 2023-10-03 PROBLEM — R53.83 FATIGUE: Status: ACTIVE | Noted: 2023-10-03

## 2023-10-03 PROBLEM — Z94.0 KIDNEY REPLACED BY TRANSPLANT (HHS-HCC): Status: ACTIVE | Noted: 2017-02-19

## 2023-10-03 PROBLEM — M1A.9XX0 CHRONIC GOUTY ARTHRITIS: Status: ACTIVE | Noted: 2023-10-03

## 2023-10-03 PROBLEM — I50.32 CHRONIC DIASTOLIC CONGESTIVE HEART FAILURE (MULTI): Status: ACTIVE | Noted: 2023-10-03

## 2023-10-03 PROBLEM — N18.4 CHRONIC KIDNEY DISEASE, STAGE IV (SEVERE) (MULTI): Status: ACTIVE | Noted: 2023-07-17

## 2023-10-03 PROBLEM — I82.401 DEEP VEIN THROMBOSIS (DVT) OF RIGHT LOWER EXTREMITY (MULTI): Status: ACTIVE | Noted: 2023-10-03

## 2023-10-03 PROBLEM — K70.31 ALCOHOLIC CIRRHOSIS OF LIVER WITH ASCITES (MULTI): Status: ACTIVE | Noted: 2023-10-03

## 2023-10-03 PROBLEM — M1A.00X0 CHRONIC GOUTY ARTHRITIS: Status: ACTIVE | Noted: 2023-10-03

## 2023-10-03 PROBLEM — R04.2 HEMOPTYSIS: Status: ACTIVE | Noted: 2023-10-03

## 2023-10-03 PROBLEM — I42.9 SECONDARY CARDIOMYOPATHY (MULTI): Status: ACTIVE | Noted: 2023-10-03

## 2023-10-03 PROBLEM — M54.50 CHRONIC LOW BACK PAIN: Status: ACTIVE | Noted: 2023-10-03

## 2023-10-03 PROBLEM — H52.10 MYOPIA WITH PRESBYOPIA: Status: ACTIVE | Noted: 2023-10-03

## 2023-10-03 PROBLEM — I82.90 VENOUS THROMBOEMBOLISM (VTE): Status: ACTIVE | Noted: 2023-10-03

## 2023-10-03 PROBLEM — Z96.1 PSEUDOPHAKIA, BOTH EYES: Status: ACTIVE | Noted: 2023-10-03

## 2023-10-03 PROBLEM — H52.4 PRESBYOPIA: Status: ACTIVE | Noted: 2023-10-03

## 2023-10-03 PROBLEM — D84.9 IMMUNOSUPPRESSION (MULTI): Status: ACTIVE | Noted: 2023-10-03

## 2023-10-03 PROBLEM — K43.2 INCISIONAL HERNIA, WITHOUT OBSTRUCTION OR GANGRENE: Status: ACTIVE | Noted: 2023-10-03

## 2023-10-03 PROBLEM — N13.8 BENIGN PROSTATIC HYPERPLASIA WITH URINARY OBSTRUCTION: Status: ACTIVE | Noted: 2017-04-17

## 2023-10-03 PROBLEM — N40.1 BENIGN PROSTATIC HYPERPLASIA WITH URINARY OBSTRUCTION: Status: ACTIVE | Noted: 2017-04-17

## 2023-10-03 PROBLEM — G89.29 CHRONIC LOW BACK PAIN: Status: ACTIVE | Noted: 2023-10-03

## 2023-10-03 PROBLEM — H52.02 HYPERMETROPIA OF LEFT EYE: Status: ACTIVE | Noted: 2023-10-03

## 2023-10-03 PROBLEM — H52.4 MYOPIA WITH PRESBYOPIA: Status: ACTIVE | Noted: 2023-10-03

## 2023-10-03 PROBLEM — N28.9 RENAL/URETERAL DISEASE: Status: ACTIVE | Noted: 2023-10-03

## 2023-10-03 PROBLEM — E11.319 DIABETIC RETINOPATHY (MULTI): Status: ACTIVE | Noted: 2017-02-22

## 2023-10-03 PROBLEM — I70.0 ATHEROSCLEROSIS OF AORTA (CMS-HCC): Status: ACTIVE | Noted: 2023-10-03

## 2023-10-03 PROBLEM — I44.7 LEFT BUNDLE BRANCH BLOCK: Status: ACTIVE | Noted: 2023-10-03

## 2023-10-03 PROBLEM — R13.10 DYSPHAGIA: Status: ACTIVE | Noted: 2023-10-03

## 2023-10-03 PROBLEM — H53.9 VISION CHANGES: Status: ACTIVE | Noted: 2023-10-03

## 2023-10-03 PROBLEM — F52.9 PSYCHOSEXUAL DYSFUNCTION: Status: ACTIVE | Noted: 2023-10-03

## 2023-10-03 PROBLEM — K76.9 LIVER DISEASE: Status: ACTIVE | Noted: 2023-10-03

## 2023-10-03 PROBLEM — K21.9 GASTRO-ESOPHAGEAL REFLUX DISEASE WITHOUT ESOPHAGITIS: Status: ACTIVE | Noted: 2023-10-03

## 2023-10-03 PROBLEM — I25.10 CORONARY ARTERY CALCIFICATION SEEN ON CT SCAN: Status: ACTIVE | Noted: 2023-10-03

## 2023-10-03 PROBLEM — H54.8 LEGAL BLINDNESS, AS DEFINED IN UNITED STATES OF AMERICA: Status: ACTIVE | Noted: 2023-10-03

## 2023-10-03 PROBLEM — L91.0 KELOID: Status: ACTIVE | Noted: 2023-10-03

## 2023-10-03 PROBLEM — I47.10 SUPRAVENTRICULAR TACHYCARDIA (CMS-HCC): Status: ACTIVE | Noted: 2023-10-03

## 2023-10-03 PROBLEM — M54.16 LUMBAR RADICULOPATHY: Status: ACTIVE | Noted: 2023-10-03

## 2023-10-03 PROBLEM — Z99.2 DEPENDENCE ON RENAL DIALYSIS (CMS-HCC): Status: ACTIVE | Noted: 2023-10-03

## 2023-10-03 PROBLEM — R77.2 ELEVATED AFP: Status: ACTIVE | Noted: 2023-10-03

## 2023-10-03 PROBLEM — I27.20 PULMONARY HYPERTENSION (MULTI): Status: ACTIVE | Noted: 2023-10-03

## 2023-10-03 RX ORDER — POLYETHYLENE GLYCOL 3350 17 G/17G
POWDER, FOR SOLUTION ORAL
COMMUNITY
End: 2024-01-24 | Stop reason: WASHOUT

## 2023-10-03 RX ORDER — TRIAMCINOLONE ACETONIDE 1 MG/G
1 CREAM TOPICAL 3 TIMES DAILY PRN
COMMUNITY
Start: 2021-03-23

## 2023-10-03 RX ORDER — BACITRACIN 500 [USP'U]/G
1 OINTMENT TOPICAL 2 TIMES DAILY
COMMUNITY
Start: 2022-11-18 | End: 2023-11-27 | Stop reason: ALTCHOICE

## 2023-10-03 RX ORDER — NICOTINE POLACRILEX 4 MG
LOZENGE BUCCAL
COMMUNITY
Start: 2022-11-17 | End: 2024-01-24 | Stop reason: WASHOUT

## 2023-10-03 RX ORDER — PHENAZOPYRIDINE 200 MG/1
200 TABLET, FILM COATED ORAL
COMMUNITY
End: 2023-11-09

## 2023-10-03 RX ORDER — PREDNISOLONE ACETATE 10 MG/ML
1 SUSPENSION/ DROPS OPHTHALMIC 4 TIMES DAILY
COMMUNITY
Start: 2023-03-08 | End: 2024-01-24 | Stop reason: WASHOUT

## 2023-10-03 RX ORDER — RIVAROXABAN 20 MG/1
20 TABLET, FILM COATED ORAL
COMMUNITY
Start: 2023-06-27 | End: 2024-01-25 | Stop reason: SDUPTHER

## 2023-10-03 RX ORDER — TROSPIUM CHLORIDE 20 MG/1
20 TABLET, FILM COATED ORAL 2 TIMES DAILY
COMMUNITY
End: 2023-11-27 | Stop reason: ALTCHOICE

## 2023-10-03 RX ORDER — INSULIN DEGLUDEC 100 U/ML
30 INJECTION, SOLUTION SUBCUTANEOUS EVERY MORNING
COMMUNITY
Start: 2022-02-14

## 2023-10-03 RX ORDER — ATORVASTATIN CALCIUM 20 MG/1
20 TABLET, FILM COATED ORAL DAILY
COMMUNITY

## 2023-10-03 RX ORDER — ARTIFICIAL TEARS 1; 2; 3 MG/ML; MG/ML; MG/ML
2 SOLUTION/ DROPS OPHTHALMIC EVERY 4 HOURS PRN
COMMUNITY
Start: 2023-02-01

## 2023-10-03 RX ORDER — OXYBUTYNIN CHLORIDE 5 MG/1
5 TABLET ORAL 3 TIMES DAILY
COMMUNITY
Start: 2023-02-01 | End: 2023-11-16 | Stop reason: ALTCHOICE

## 2023-10-03 RX ORDER — TADALAFIL 20 MG/1
20 TABLET ORAL DAILY PRN
COMMUNITY
Start: 2021-06-15 | End: 2024-01-24 | Stop reason: ALTCHOICE

## 2023-10-03 RX ORDER — INSULIN LISPRO 100 [IU]/ML
10 INJECTION, SOLUTION INTRAVENOUS; SUBCUTANEOUS NIGHTLY
COMMUNITY

## 2023-10-03 RX ORDER — ALFUZOSIN HYDROCHLORIDE 10 MG/1
1 TABLET, EXTENDED RELEASE ORAL DAILY
COMMUNITY
Start: 2018-12-11 | End: 2023-12-04 | Stop reason: ALTCHOICE

## 2023-10-03 RX ORDER — BLOOD SUGAR DIAGNOSTIC
STRIP MISCELLANEOUS
COMMUNITY
Start: 2023-05-24

## 2023-10-03 RX ORDER — METOPROLOL SUCCINATE 50 MG/1
50 TABLET, EXTENDED RELEASE ORAL DAILY
COMMUNITY
Start: 2023-02-01 | End: 2024-04-01

## 2023-10-03 RX ORDER — ASPIRIN 325 MG
50000 TABLET, DELAYED RELEASE (ENTERIC COATED) ORAL WEEKLY
COMMUNITY
Start: 2022-07-12

## 2023-10-03 RX ORDER — TACROLIMUS 1 MG/1
1 CAPSULE ORAL 2 TIMES DAILY
COMMUNITY
Start: 2023-07-17 | End: 2024-01-24 | Stop reason: WASHOUT

## 2023-10-03 RX ORDER — WARFARIN 4 MG/1
4 TABLET ORAL
COMMUNITY
Start: 2023-02-03 | End: 2023-11-27 | Stop reason: ALTCHOICE

## 2023-10-03 RX ORDER — LANCETS
EACH MISCELLANEOUS
COMMUNITY
Start: 2022-11-18

## 2023-10-03 RX ORDER — WARFARIN 1 MG/1
1 TABLET ORAL
COMMUNITY
Start: 2023-03-08 | End: 2023-11-27 | Stop reason: ALTCHOICE

## 2023-10-03 RX ORDER — OLANZAPINE 2.5 MG/1
2.5 TABLET ORAL DAILY
COMMUNITY
Start: 2023-02-03 | End: 2023-11-27 | Stop reason: ALTCHOICE

## 2023-10-03 RX ORDER — LIDOCAINE 40 MG/G
CREAM TOPICAL
COMMUNITY
Start: 2022-11-18 | End: 2023-11-27 | Stop reason: ALTCHOICE

## 2023-10-03 RX ORDER — ONDANSETRON 4 MG/1
4 TABLET, ORALLY DISINTEGRATING ORAL DAILY
COMMUNITY
End: 2023-11-27 | Stop reason: ALTCHOICE

## 2023-10-03 RX ORDER — ACETAMINOPHEN 325 MG/1
325 TABLET ORAL EVERY 4 HOURS PRN
COMMUNITY
Start: 2022-11-18 | End: 2024-01-25

## 2023-10-03 RX ORDER — INSULIN ASPART 100 [IU]/ML
10 INJECTION, SOLUTION INTRAVENOUS; SUBCUTANEOUS
COMMUNITY
End: 2023-11-27 | Stop reason: ALTCHOICE

## 2023-10-03 RX ORDER — WARFARIN SODIUM 5 MG/1
5 TABLET ORAL
COMMUNITY
Start: 2023-03-08 | End: 2023-11-27 | Stop reason: SINTOL

## 2023-10-03 RX ORDER — OMEPRAZOLE 20 MG/1
20 CAPSULE, DELAYED RELEASE ORAL
COMMUNITY
End: 2023-11-27 | Stop reason: ALTCHOICE

## 2023-10-03 RX ORDER — AMLODIPINE BESYLATE 5 MG/1
5 TABLET ORAL 2 TIMES DAILY
COMMUNITY
Start: 2023-07-17 | End: 2023-11-27 | Stop reason: ALTCHOICE

## 2023-10-03 RX ORDER — INSULIN GLARGINE-YFGN 100 [IU]/ML
30 INJECTION, SOLUTION SUBCUTANEOUS
COMMUNITY
End: 2023-11-27 | Stop reason: ALTCHOICE

## 2023-10-07 PROBLEM — G89.29 CHRONIC LOW BACK PAIN: Status: RESOLVED | Noted: 2023-10-03 | Resolved: 2023-10-07

## 2023-10-07 PROBLEM — R04.2 HEMOPTYSIS: Status: RESOLVED | Noted: 2023-10-03 | Resolved: 2023-10-07

## 2023-10-07 PROBLEM — R53.83 FATIGUE: Status: RESOLVED | Noted: 2023-10-03 | Resolved: 2023-10-07

## 2023-10-07 PROBLEM — R53.1 FUNCTIONAL WEAKNESS: Status: RESOLVED | Noted: 2023-09-19 | Resolved: 2023-10-07

## 2023-10-07 PROBLEM — T82.9XXA COMPLICATION OF ARTERIOVENOUS DIALYSIS FISTULA: Status: RESOLVED | Noted: 2023-10-03 | Resolved: 2023-10-07

## 2023-10-07 PROBLEM — Z99.2 DEPENDENCE ON RENAL DIALYSIS (CMS-HCC): Status: RESOLVED | Noted: 2023-10-03 | Resolved: 2023-10-07

## 2023-10-07 PROBLEM — Z99.2 ESRD (END STAGE RENAL DISEASE) ON DIALYSIS (MULTI): Status: RESOLVED | Noted: 2023-09-14 | Resolved: 2023-10-07

## 2023-10-07 PROBLEM — F52.9 PSYCHOSEXUAL DYSFUNCTION: Status: RESOLVED | Noted: 2023-10-03 | Resolved: 2023-10-07

## 2023-10-07 PROBLEM — H53.9 VISION CHANGES: Status: RESOLVED | Noted: 2023-10-03 | Resolved: 2023-10-07

## 2023-10-07 PROBLEM — R53.1 WEAKNESS: Status: ACTIVE | Noted: 2023-10-07

## 2023-10-07 PROBLEM — N18.4 CHRONIC KIDNEY DISEASE, STAGE IV (SEVERE) (MULTI): Status: RESOLVED | Noted: 2023-07-17 | Resolved: 2023-10-07

## 2023-10-07 PROBLEM — N18.6 ESRD (END STAGE RENAL DISEASE) ON DIALYSIS (MULTI): Status: RESOLVED | Noted: 2023-09-14 | Resolved: 2023-10-07

## 2023-10-07 PROBLEM — L91.0 KELOID: Status: RESOLVED | Noted: 2023-10-03 | Resolved: 2023-10-07

## 2023-10-07 PROBLEM — K76.9 LIVER DISEASE: Status: RESOLVED | Noted: 2023-10-03 | Resolved: 2023-10-07

## 2023-10-07 PROBLEM — Z91.81 STATUS POST FALL: Status: RESOLVED | Noted: 2023-09-14 | Resolved: 2023-10-07

## 2023-10-07 PROBLEM — M54.50 CHRONIC LOW BACK PAIN: Status: RESOLVED | Noted: 2023-10-03 | Resolved: 2023-10-07

## 2023-10-07 PROBLEM — N28.9 RENAL/URETERAL DISEASE: Status: RESOLVED | Noted: 2023-10-03 | Resolved: 2023-10-07

## 2023-10-11 ENCOUNTER — PROCEDURE VISIT (OUTPATIENT)
Dept: UROLOGY | Facility: HOSPITAL | Age: 73
End: 2023-10-11
Payer: COMMERCIAL

## 2023-10-11 DIAGNOSIS — Z87.440 HISTORY OF UTI: Primary | ICD-10-CM

## 2023-10-11 DIAGNOSIS — N32.0 BLADDER NECK CONTRACTURE: ICD-10-CM

## 2023-10-11 DIAGNOSIS — E11.9 TYPE 2 DIABETES MELLITUS WITHOUT COMPLICATIONS (MULTI): ICD-10-CM

## 2023-10-11 PROCEDURE — 99214 OFFICE O/P EST MOD 30 MIN: CPT | Mod: 25 | Performed by: UROLOGY

## 2023-10-11 PROCEDURE — 99214 OFFICE O/P EST MOD 30 MIN: CPT | Performed by: UROLOGY

## 2023-10-11 PROCEDURE — 52000 CYSTOURETHROSCOPY: CPT | Performed by: UROLOGY

## 2023-10-11 RX ORDER — INSULIN ASPART 100 [IU]/ML
INJECTION, SOLUTION INTRAVENOUS; SUBCUTANEOUS
Refills: 5 | OUTPATIENT
Start: 2023-10-11

## 2023-10-11 RX ORDER — SULFAMETHOXAZOLE AND TRIMETHOPRIM 800; 160 MG/1; MG/1
1 TABLET ORAL 2 TIMES DAILY
Qty: 4 TABLET | Refills: 0 | Status: SHIPPED | OUTPATIENT
Start: 2023-10-11 | End: 2023-10-19 | Stop reason: ALTCHOICE

## 2023-10-11 NOTE — PROGRESS NOTES
HPI  Per Dr. Luo note 8/30/23:  Mr. Schwartz is a 72 year old male s/p HoLEP with Dr. Sifuentes January 2023, last seen May 1, 2023. Path 43g benign.          -Reports weak stream, difficulty voiding   -UA no signs of infection, PVR slightly elevated (149cc)   -Will fu with Dr. Castle for cysto to r/o any stricture or other pathology given persistent LUTS.    Admitted 9/3/23 for UTI, pyelo.    10/11/2023 - seen today for cysto due to urinary hesitancy/ incomplete emptying. Stream is profoundly weak    Lab Results   Component Value Date    PSA 0.80 06/29/2020    PSA 0.84 03/12/2019       Current Medications:  Current Outpatient Medications   Medication Sig Dispense Refill   • acetaminophen (Tylenol) 325 mg tablet      • alfuzosin (Uroxatral) 10 mg 24 hr tablet Take 1 tablet (10 mg) by mouth once daily.     • amLODIPine (Norvasc) 2.5 mg tablet TAKE ONE (1) TABLET BY MOUTH ONCE DAILY. 90 tablet 3   • amLODIPine (Norvasc) 5 mg tablet Take 1 tablet (5 mg) by mouth twice a day.     • artificial tears, dextran-hypomel-glycerin, 0.1-0.3-0.2 % ophthalmic solution Administer 2 drops into both eyes every 4 hours if needed for dry eyes.     • atorvastatin (Lipitor) 20 mg tablet Take 1 tablet (20 mg) by mouth once daily.     • bacitracin 500 unit/gram ointment Apply 1 Application topically twice a day. to affected area     • blood sugar diagnostic (Accu-Chek Tori Plus test strp) strip Use as instructed TWICE DAILY FOR TESTING FOR nON INSULIN DEPENDENT DIABETES E11.319     • cholecalciferol (Vitamin D-3) 1,250 mcg (50,000 unit) capsule Take 1 capsule (50,000 Units) by mouth once a week.     • ciprofloxacin (Cipro) 500 mg tablet TAKE 1 TABLET BY MOUTH EVERY 24 HOURS 6 tablet 0   • insulin degludec (Tresiba FlexTouch) 100 unit/mL (3 mL) injection Inject 30 Units under the skin once daily in the morning. And 10 units in the PM     • insulin glargine-yfgn 100 unit/mL pen Inject 30 Units under the skin.     • insulin lispro (HumaLOG) 100  unit/mL injection Inject 0.1 mL (10 Units) under the skin once daily at bedtime.     • lidocaine 4 % cream      • losartan (Cozaar) 25 mg tablet TAKE 2 TABLETS BY MOUTH ONCE DAILY 60 tablet 2   • metoprolol succinate XL (Toprol-XL) 50 mg 24 hr tablet Take 1 tablet (50 mg) by mouth once daily.     • mycophenolate (Cellcept) 250 mg capsule TAKE TWO (2) CAPSULES BY MOUTH TWICE DAILY. 360 capsule 3   • NovoLOG FlexPen U-100 Insulin 100 unit/mL (3 mL) pen Inject 10 Units under the skin 2 times a day before meals.     • OLANZapine (ZyPREXA) 2.5 mg tablet Take 1 tablet (2.5 mg) by mouth once daily. At 18:30     • omeprazole (PriLOSEC) 20 mg DR capsule Take 1 capsule (20 mg) by mouth once daily in the morning. Take before meals. 30 minutes before morning meal     • ondansetron ODT (Zofran-ODT) 4 mg disintegrating tablet Take 1 tablet (4 mg) by mouth once daily. on the tongue and allow to dissolve     • OneTouch UltraSoft Lancets misc      • oxybutynin (Ditropan) 5 mg tablet Take 1 tablet (5 mg) by mouth 3 times a day.     • oxyCODONE (Roxicodone) 5 mg immediate release tablet Take 1 tablet (5 mg) by mouth every 8 hours if needed for moderate pain (4 - 6). 90 tablet 0   • polyethylene glycol (Glycolax, Miralax) 17 gram/dose powder Take by mouth.  as directed     • prednisoLONE acetate (Pred-Forte) 1 % ophthalmic suspension Administer 1 drop into the left eye 4 times a day. use until your next eye exam     • Pyridium 200 mg tablet Take 1 tablet (200 mg) by mouth 3 times a day after meals.     • Stool Softener-Laxative 8.6-50 mg tablet      • tacrolimus (Prograf) 0.5 mg capsule TAKE TWO (2) CAPSULES BY MOUTH EVERY 12 HOURS. 360 capsule 3   • tacrolimus (Prograf) 1 mg capsule Take 1 capsule (1 mg) by mouth twice a day.     • tadalafil 20 mg tablet Take 1 tablet (20 mg) by mouth once daily as needed for erectile dysfunction (1 HOUR BEFORE NEEDED).     • triamcinolone (Kenalog) 0.1 % cream Apply 1 Application topically. to  affected area 2-3 times a day     • trospium (Sanctura) 20 mg tablet Take 1 tablet (20 mg) by mouth 2 times a day.     • warfarin (Coumadin) 1 mg tablet Take 1 tablet (1 mg) by mouth. every day as directed     • warfarin (Coumadin) 5 mg tablet Take 1 tablet (5 mg) by mouth. DAILY AS DIRECTED. PER PATIENT 5 MG 7 DAYS A WEEK     • warfarin (Jantoven) 4 mg tablet Take 1 tablet (4 mg) by mouth. once a day Check INR tomorrow, saturday 2/4/23     • Xarelto 20 mg tablet Take 1 tablet (20 mg) by mouth once daily in the evening. Take with meals.       No current facility-administered medications for this visit.        Active Problems:  Albert Schwartz is a 72 y.o. male with the following Problems and Medications.  Patient Active Problem List   Diagnosis   • Diastolic dysfunction   • Hyperlipidemia   • Hypertension, essential   • Obstructive chronic bronchitis without exacerbation   • PAD (peripheral artery disease) (CMS/HCC)   • Type 2 diabetes mellitus (CMS/HCC)   • Iatrogenic pulmonary embolism and infarction (CMS/HCC)   • Gastroparesis due to secondary diabetes (CMS/HCC)   • Vitamin D deficiency   • Venous thromboembolism (VTE)   • Tobacco use disorder   • Supraventricular tachycardia   • Senile nuclear sclerosis   • Secondary cardiomyopathy (CMS/HCC)   • Retinitis pigmentosa   • Pulmonary hypertension (CMS/HCC)   • Pseudophakia, both eyes   • Presbyopia   • Peptic ulcer disease   • Myopia with presbyopia   • Lumbosacral spondylosis without myelopathy   • Ventral hernia   • Lumbar radiculopathy   • Legal blindness, as defined in [place]   • Left bundle branch block   • Incisional hernia, without obstruction or gangrene   • Immunosuppression (CMS/HCC)   • Hypermetropia of left eye   • History of noncompliance with medical treatment   • Gastro-esophageal reflux disease without esophagitis   • Elevated AFP   • Dysphagia   • Degenerative lumbar disc   • Deep vein thrombosis (DVT) of right lower extremity  (CMS/HCC)   • Coronary artery calcification seen on CT scan   • Chronic hepatitis C virus infection (CMS/HCC)   • Chronic gouty arthritis   • Chronic diastolic congestive heart failure (CMS/HCC)   • Benign prostatic hyperplasia with urinary obstruction   • Atherosclerosis of aorta (CMS/HCC)   • Astigmatism, bilateral   • Arthritis of both knees   • Anemia in chronic kidney disease   • Alcoholic cirrhosis of liver with ascites (CMS/HCC)   • Kidney replaced by transplant   • Diabetic retinopathy (CMS/HCC)   • Weakness     Current Outpatient Medications   Medication Sig Dispense Refill   • acetaminophen (Tylenol) 325 mg tablet      • alfuzosin (Uroxatral) 10 mg 24 hr tablet Take 1 tablet (10 mg) by mouth once daily.     • amLODIPine (Norvasc) 2.5 mg tablet TAKE ONE (1) TABLET BY MOUTH ONCE DAILY. 90 tablet 3   • amLODIPine (Norvasc) 5 mg tablet Take 1 tablet (5 mg) by mouth twice a day.     • artificial tears, dextran-hypomel-glycerin, 0.1-0.3-0.2 % ophthalmic solution Administer 2 drops into both eyes every 4 hours if needed for dry eyes.     • atorvastatin (Lipitor) 20 mg tablet Take 1 tablet (20 mg) by mouth once daily.     • bacitracin 500 unit/gram ointment Apply 1 Application topically twice a day. to affected area     • blood sugar diagnostic (Accu-Chek Tori Plus test strp) strip Use as instructed TWICE DAILY FOR TESTING FOR nON INSULIN DEPENDENT DIABETES E11.319     • cholecalciferol (Vitamin D-3) 1,250 mcg (50,000 unit) capsule Take 1 capsule (50,000 Units) by mouth once a week.     • ciprofloxacin (Cipro) 500 mg tablet TAKE 1 TABLET BY MOUTH EVERY 24 HOURS 6 tablet 0   • insulin degludec (Tresiba FlexTouch) 100 unit/mL (3 mL) injection Inject 30 Units under the skin once daily in the morning. And 10 units in the PM     • insulin glargine-yfgn 100 unit/mL pen Inject 30 Units under the skin.     • insulin lispro (HumaLOG) 100 unit/mL injection Inject 0.1 mL (10 Units) under the skin once daily at bedtime.      • lidocaine 4 % cream      • losartan (Cozaar) 25 mg tablet TAKE 2 TABLETS BY MOUTH ONCE DAILY 60 tablet 2   • metoprolol succinate XL (Toprol-XL) 50 mg 24 hr tablet Take 1 tablet (50 mg) by mouth once daily.     • mycophenolate (Cellcept) 250 mg capsule TAKE TWO (2) CAPSULES BY MOUTH TWICE DAILY. 360 capsule 3   • NovoLOG FlexPen U-100 Insulin 100 unit/mL (3 mL) pen Inject 10 Units under the skin 2 times a day before meals.     • OLANZapine (ZyPREXA) 2.5 mg tablet Take 1 tablet (2.5 mg) by mouth once daily. At 18:30     • omeprazole (PriLOSEC) 20 mg DR capsule Take 1 capsule (20 mg) by mouth once daily in the morning. Take before meals. 30 minutes before morning meal     • ondansetron ODT (Zofran-ODT) 4 mg disintegrating tablet Take 1 tablet (4 mg) by mouth once daily. on the tongue and allow to dissolve     • OneTouch UltraSoft Lancets misc      • oxybutynin (Ditropan) 5 mg tablet Take 1 tablet (5 mg) by mouth 3 times a day.     • oxyCODONE (Roxicodone) 5 mg immediate release tablet Take 1 tablet (5 mg) by mouth every 8 hours if needed for moderate pain (4 - 6). 90 tablet 0   • polyethylene glycol (Glycolax, Miralax) 17 gram/dose powder Take by mouth.  as directed     • prednisoLONE acetate (Pred-Forte) 1 % ophthalmic suspension Administer 1 drop into the left eye 4 times a day. use until your next eye exam     • Pyridium 200 mg tablet Take 1 tablet (200 mg) by mouth 3 times a day after meals.     • Stool Softener-Laxative 8.6-50 mg tablet      • tacrolimus (Prograf) 0.5 mg capsule TAKE TWO (2) CAPSULES BY MOUTH EVERY 12 HOURS. 360 capsule 3   • tacrolimus (Prograf) 1 mg capsule Take 1 capsule (1 mg) by mouth twice a day.     • tadalafil 20 mg tablet Take 1 tablet (20 mg) by mouth once daily as needed for erectile dysfunction (1 HOUR BEFORE NEEDED).     • triamcinolone (Kenalog) 0.1 % cream Apply 1 Application topically. to affected area 2-3 times a day     • trospium (Sanctura) 20 mg tablet Take 1 tablet (20  mg) by mouth 2 times a day.     • warfarin (Coumadin) 1 mg tablet Take 1 tablet (1 mg) by mouth. every day as directed     • warfarin (Coumadin) 5 mg tablet Take 1 tablet (5 mg) by mouth. DAILY AS DIRECTED. PER PATIENT 5 MG 7 DAYS A WEEK     • warfarin (Jantoven) 4 mg tablet Take 1 tablet (4 mg) by mouth. once a day Check INR tomorrow, saturday 2/4/23     • Xarelto 20 mg tablet Take 1 tablet (20 mg) by mouth once daily in the evening. Take with meals.       No current facility-administered medications for this visit.       PMH:  Past Medical History:   Diagnosis Date   • Abnormality of alphafetoprotein     Elevated AFP   • Alcohol abuse, in remission 05/01/2008   • Alcohol dependence, uncomplicated (CMS/HCC) 01/18/2018    Alcohol dependence   • Alcohol dependence, uncomplicated (CMS/HCC) 01/18/2018    Alcohol dependence   • Alcohol dependence, uncomplicated (CMS/HCC) 01/18/2018    Alcohol dependence   • Alcohol dependence, uncomplicated (CMS/HCC) 01/18/2018    Alcohol dependence   • Alcoholic cirrhosis of liver with ascites (CMS/HCC) 10/05/2017    Alcoholic cirrhosis of liver with ascites   • Awaiting organ transplant status 01/18/2018    Pre-liver transplant, listed   • Awaiting organ transplant status 01/18/2018    Pre-liver transplant, listed   • Awaiting organ transplant status 01/18/2018    Pre-liver transplant, listed   • Awaiting organ transplant status 01/18/2018    Pre-liver transplant, listed   • Benign prostatic hyperplasia with lower urinary tract symptoms 01/18/2018    BPH associated with nocturia   • Benign prostatic hyperplasia with lower urinary tract symptoms 01/18/2018    BPH associated with nocturia   • Benign prostatic hyperplasia with lower urinary tract symptoms 01/18/2018    BPH associated with nocturia   • Benign prostatic hyperplasia with lower urinary tract symptoms 01/18/2018    BPH associated with nocturia   • Chronic diastolic (congestive) heart failure (CMS/HCC) 01/18/2018    Chronic  diastolic congestive heart failure   • Chronic diastolic (congestive) heart failure (CMS/[facility]) 01/18/2018    Chronic diastolic congestive heart failure   • Chronic diastolic (congestive) heart failure (CMS/[facility]) 01/18/2018    Chronic diastolic congestive heart failure   • Chronic diastolic (congestive) heart failure (CMS/[facility]) 01/18/2018    Chronic diastolic congestive heart failure   • Chronic kidney disease, stage 3 unspecified (CMS/[facility]) 01/18/2018    Chronic kidney disease (CKD), stage III (moderate)   • Chronic kidney disease, stage 3 unspecified (CMS/HCC) 01/18/2018    Chronic kidney disease (CKD), stage III (moderate)   • Chronic kidney disease, stage 3 unspecified (CMS/[facility]) 01/18/2018    Chronic kidney disease (CKD), stage III (moderate)   • Chronic kidney disease, stage 3 unspecified (CMS/HCC) 01/18/2018    Chronic kidney disease (CKD), stage III (moderate)   • Chronic kidney disease, stage 4 (severe) (CMS/HCC) 10/20/2016    Chronic kidney disease, stage IV (severe)   • Chronic viral hepatitis C (CMS/[facility]) 01/18/2018    Chronic viral hepatitis C   • Chronic viral hepatitis C (CMS/[facility]) 01/18/2018    Chronic viral hepatitis C   • Constipation, unspecified 01/18/2018    Constipation   • Constipation, unspecified 01/18/2018    Constipation   • Constipation, unspecified 01/18/2018    Constipation   • Constipation, unspecified 01/18/2018    Constipation   • Disorder of kidney and ureter, unspecified 01/18/2018    Renal/ureteral disease   • Disorder of kidney and ureter, unspecified 01/18/2018    Renal/ureteral disease   • Disorder of kidney and ureter, unspecified 01/18/2018    Renal/ureteral disease   • Disorder of kidney and ureter, unspecified 01/18/2018    Renal/ureteral disease   • Disorder of male genital organs, unspecified 12/11/2018    Testicular abnormality   • Disorder of male genital organs, unspecified 01/18/2018    Testicular abnormality   • Disorder of male genital organs, unspecified 01/18/2018     Testicular abnormality   • Disorder of male genital organs, unspecified 01/18/2018    Testicular abnormality   • Disorder of male genital organs, unspecified 01/18/2018    Testicular abnormality   • Encounter for other preprocedural examination 01/18/2018    Preoperative testing   • Encounter for other preprocedural examination 01/18/2018    Encounter for pre-transplant evaluation for chronic liver disease   • Encounter for other preprocedural examination 01/18/2018    Pre-transplant evaluation for ESRD (end stage renal disease)   • Encounter for other preprocedural examination 01/18/2018    Encounter for pre-transplant evaluation for chronic liver disease   • Encounter for other preprocedural examination 01/18/2018    Preoperative testing   • Encounter for other preprocedural examination 01/18/2018    Pre-transplant evaluation for ESRD (end stage renal disease)   • Encounter for other preprocedural examination 01/18/2018    Encounter for pre-transplant evaluation for chronic liver disease   • Encounter for other preprocedural examination 01/18/2018    Pre-transplant evaluation for ESRD (end stage renal disease)   • Encounter for other preprocedural examination 01/18/2018    Preoperative testing   • Encounter for other preprocedural examination 01/18/2018    Encounter for pre-transplant evaluation for chronic liver disease   • Encounter for other preprocedural examination 01/18/2018    Preoperative testing   • Encounter for other preprocedural examination 01/18/2018    Pre-transplant evaluation for ESRD (end stage renal disease)   • Encounter for screening for malignant neoplasm of prostate 01/18/2018    Encounter for prostate cancer screening   • Encounter for screening for malignant neoplasm of prostate 01/18/2018    Encounter for prostate cancer screening   • Encounter for screening for malignant neoplasm of prostate 01/18/2018    Encounter for prostate cancer screening   • Encounter for screening for malignant  neoplasm of prostate 01/18/2018    Encounter for prostate cancer screening   • End stage renal disease (CMS/HCC) 01/18/2018    ESRD (end stage renal disease) on dialysis   • End stage renal disease (CMS/HCC) 01/18/2018    ESRD (end stage renal disease) on dialysis   • Essential (primary) hypertension 01/27/2020    Hypertension   • Immunodeficiency, unspecified (CMS/HCC) 01/18/2018    Immunosuppression   • Immunodeficiency, unspecified (CMS/HCC) 01/18/2018    Immunosuppression   • Immunodeficiency, unspecified (CMS/HCC) 01/18/2018    Immunosuppression   • Immunodeficiency, unspecified (CMS/HCC) 01/18/2018    Immunosuppression   • Kidney transplant status 01/18/2018    Kidney replaced by transplant   • Kidney transplant status 01/18/2018    Kidney replaced by transplant   • Liver disease, unspecified 01/18/2018    Liver disease   • Liver disease, unspecified 01/18/2018    Liver disease   • Liver disease, unspecified 01/18/2018    Liver disease   • Liver disease, unspecified 01/18/2018    Liver disease   • Other ascites 11/07/2022    Ascites   • Other conditions influencing health status 01/18/2018    Wound pain   • Other conditions influencing health status 01/18/2018    Wound pain   • Other conditions influencing health status 01/18/2018    Wound pain   • Other conditions influencing health status 01/18/2018    Wound pain   • Other ill-defined heart diseases 01/18/2018    Diastolic dysfunction   • Other ill-defined heart diseases 01/18/2018    Diastolic dysfunction   • Other ill-defined heart diseases 01/18/2018    Diastolic dysfunction   • Other ill-defined heart diseases 01/18/2018    Diastolic dysfunction   • Other specified personal risk factors, not elsewhere classified 01/18/2018    At risk for infection transmitted from donor   • Other specified personal risk factors, not elsewhere classified 01/18/2018    At risk for infection transmitted from donor   • Other specified personal risk factors, not elsewhere  classified 01/18/2018    At risk for infection transmitted from donor   • Other specified personal risk factors, not elsewhere classified 01/18/2018    At risk for infection transmitted from donor   • Pain in left hand 01/18/2018    Pain of left hand   • Pain in left hand 01/18/2018    Pain of left hand   • Pain in left hand 01/18/2018    Pain of left hand   • Pain in left hand 01/18/2018    Pain of left hand   • Peripheral vascular disease, unspecified (CMS/HCC) 01/18/2018    PAD (peripheral artery disease)   • Peripheral vascular disease, unspecified (CMS/HCC) 01/18/2018    PAD (peripheral artery disease)   • Peripheral vascular disease, unspecified (CMS/HCC) 01/18/2018    PAD (peripheral artery disease)   • Peripheral vascular disease, unspecified (CMS/HCC) 01/18/2018    PAD (peripheral artery disease)   • Personal history of other diseases of the circulatory system 08/18/2014    History of essential hypertension   • Personal history of other diseases of the musculoskeletal system and connective tissue     History of arthritis   • Pruritus, unspecified 02/21/2018    Pruritus   • Type 2 diabetes mellitus without complications (CMS/HCC) 09/01/2022    Diabetes mellitus   • Unspecified cirrhosis of liver (CMS/HCC) 01/18/2018    Cirrhosis   • Unspecified cirrhosis of liver (CMS/HCC) 01/18/2018    Cirrhosis   • Unspecified cirrhosis of liver (CMS/HCC) 01/18/2018    Cirrhosis   • Unspecified cirrhosis of liver (CMS/HCC) 01/18/2018    Cirrhosis   • Unspecified complication of cardiac and vascular prosthetic device, implant and graft, initial encounter 01/18/2018    Complication of arteriovenous dialysis fistula, initial encounter   • Unspecified complication of cardiac and vascular prosthetic device, implant and graft, initial encounter 01/18/2018    Complication of arteriovenous dialysis fistula, initial encounter   • Unspecified complication of cardiac and vascular prosthetic device, implant and graft, initial  encounter 01/18/2018    Complication of arteriovenous dialysis fistula, initial encounter   • Unspecified complication of cardiac and vascular prosthetic device, implant and graft, initial encounter 01/18/2018    Complication of arteriovenous dialysis fistula, initial encounter   • Unspecified visual disturbance 01/18/2018    Vision changes   • Unspecified visual disturbance 01/18/2018    Vision changes   • Unspecified visual disturbance 01/18/2018    Vision changes   • Unspecified visual disturbance 01/18/2018    Vision changes       PSH:  Past Surgical History:   Procedure Laterality Date   • CATARACT EXTRACTION  01/18/2018    Cataract Extraction   • FL GUIDED ABDOMINAL PARACENTESIS  3/30/2015    FL GUIDED ABDOMINAL PARACENTESIS 3[place]/2015 [facility] [place] [facility]   • FL GUIDED ABDOMINAL PARACENTESIS  4/14/2015    FL GUIDED ABDOMINAL PARACENTESIS 4/14/2015 [facility] [place] [facility]   • FL GUIDED ABDOMINAL PARACENTESIS  8/28/2015    FL GUIDED ABDOMINAL PARACENTESIS 8/28/2015 [facility] [place] [facility]   • KIDNEY SURGERY  06/02/2020    Kidney Surgery   • OTHER SURGICAL HISTORY  08/18/2014    Brain Surgery   • OTHER SURGICAL HISTORY  06/30/2020    Renal Transplant   • OTHER SURGICAL HISTORY  06/02/2020    Incisional Hernia Repair   • SPLENECTOMY, TOTAL  06/02/2020    Splenectomy   • US GUIDED ABDOMINAL PARACENTESIS  3/20/2014    US GUIDED ABDOMINAL PARACENTESIS 3/20/2014 [facility] [place] [facility]   • US GUIDED ABDOMINAL PARACENTESIS  8/14/2014    US GUIDED ABDOMINAL PARACENTESIS 8/14/2014 [facility] [place] [facility]   • US GUIDED ABDOMINAL PARACENTESIS  11/17/2014    US GUIDED ABDOMINAL PARACENTESIS 11/17/2014 [facility] [place] [facility]   • US GUIDED ABDOMINAL PARACENTESIS  12/11/2014    US GUIDED ABDOMINAL PARACENTESIS 12/11/2014 [facility]   • US GUIDED ABDOMINAL PARACENTESIS  1/27/2015    US GUIDED ABDOMINAL PARACENTESIS 1/27/2015 [facility] [place] [facility]   • US GUIDED ABDOMINAL PARACENTESIS  2/19/2015    US GUIDED ABDOMINAL PARACENTESIS 2/19/2015 [facility] [place] [facility]   • US  "GUIDED ABDOMINAL PARACENTESIS  3/18/2015    US GUIDED ABDOMINAL PARACENTESIS 3/18/2015 [facility] [place] [facility]   • US GUIDED ABDOMINAL PARACENTESIS  4/17/2015    US GUIDED ABDOMINAL PARACENTESIS 4/17/2015 [facility] [place] [facility]   • US GUIDED ABDOMINAL PARACENTESIS  7/14/2015    US GUIDED ABDOMINAL PARACENTESIS 7/14/2015 [facility] [place] [facility]   • US GUIDED ABDOMINAL PARACENTESIS  10/12/2015    US GUIDED ABDOMINAL PARACENTESIS 10/12/2015 [facility] CLINICAL LEGACY   • US GUIDED ABDOMINAL PARACENTESIS  10/19/2015    US GUIDED ABDOMINAL PARACENTESIS 10/19/2015 [facility] CLINICAL LEGACY       FMH:  No family history on file.    Allergies:  Allergies   Allergen Reactions   • Penicillins Anaphylaxis, Hives, Shortness of breath and Swelling     swollen tongue   • Ace Inhibitors Other     ESRD   • Arb-Angiotensin Receptor Antagonist Other     ESRD   • Influenza Tri-Split 2007 Vac Other   • Oxycodone-Acetaminophen Other     \"ZONED OUT\"   • Ceftriaxone Itching and Rash     pt endorces itching to face and abd     Cystoscopy    Date/Time: 10/11/2023 3:40 PM    Performed by: Cabrera Castle MD  Authorized by: Cabrera Castle MD    Procedure - Bladder Cystoscopy:     Procedure details: cystoscopy    Post-procedure:     Patient tolerance: Patient tolerated the procedure well with no immediate complications      Comments:      Procedure:  After informed consent was obtained, the patient was taken to the procedure room for cystoscopy due to urinary hesitancy/ incomplete emptying.     Cystoscopy     Procedure Note:    A sterile prep and drape was performed in standard fashion. Lidocaine was used for topical anesthesia. A flexible cystoscope was inserted into the urethra without difficulty revealing normal urethra.     The prostate was nicely enucleated, but the bladder neck was completely obliterated. There was a small collection of calcifications in what may have been the bladder neck, but with gentle probing could not find a bladder neck.     Post-Procedure: "   The cystoscope was removed. The vital signs were stable . The patient tolerated the procedure well. There were no complications.           Assesment/Plan  Severe bladder neck contracture s/p prior HoLEP. D/w Dr. Gonzales, will refer to him for consideration of reconstructive management options.    2d bactrim given for ppx      Scribe Attestation  By signing my name below, I, Radha Serra , Scribe   attest that this documentation has been prepared under the direction and in the presence of Cabrera Castle MD.

## 2023-10-16 ENCOUNTER — APPOINTMENT (OUTPATIENT)
Dept: TRANSPLANT | Facility: HOSPITAL | Age: 73
End: 2023-10-16
Payer: COMMERCIAL

## 2023-10-16 ENCOUNTER — OFFICE VISIT (OUTPATIENT)
Dept: TRANSPLANT | Facility: HOSPITAL | Age: 73
End: 2023-10-16
Payer: COMMERCIAL

## 2023-10-16 VITALS
HEART RATE: 69 BPM | BODY MASS INDEX: 27.84 KG/M2 | OXYGEN SATURATION: 98 % | DIASTOLIC BLOOD PRESSURE: 71 MMHG | SYSTOLIC BLOOD PRESSURE: 135 MMHG | TEMPERATURE: 98.3 F | WEIGHT: 199.6 LBS

## 2023-10-16 DIAGNOSIS — Z94.0 KIDNEY REPLACED BY TRANSPLANT (HHS-HCC): Primary | ICD-10-CM

## 2023-10-16 PROCEDURE — 3078F DIAST BP <80 MM HG: CPT

## 2023-10-16 PROCEDURE — 1036F TOBACCO NON-USER: CPT

## 2023-10-16 PROCEDURE — 3066F NEPHROPATHY DOC TX: CPT

## 2023-10-16 PROCEDURE — 99213 OFFICE O/P EST LOW 20 MIN: CPT

## 2023-10-16 PROCEDURE — 1125F AMNT PAIN NOTED PAIN PRSNT: CPT

## 2023-10-16 PROCEDURE — 1160F RVW MEDS BY RX/DR IN RCRD: CPT

## 2023-10-16 PROCEDURE — 1159F MED LIST DOCD IN RCRD: CPT

## 2023-10-16 PROCEDURE — 3075F SYST BP GE 130 - 139MM HG: CPT

## 2023-10-16 ASSESSMENT — PAIN SCALES - GENERAL: PAINLEVEL: 0-NO PAIN

## 2023-10-16 NOTE — PROGRESS NOTES
Subjective   Albert Schwartz is a 72 y.o. male who underwent kidney transplant on 2/19/2017 (Kidney). Here today for follow-up. Was hospitalized in September with UTI. Complaining of sciatica. Following up with urology for incontinence.    Objective     Physical Exam  Constitutional:       Appearance: Normal appearance.      Comments: In a wheel chair   HENT:      Head: Normocephalic.      Mouth/Throat:      Mouth: Mucous membranes are moist.   Cardiovascular:      Rate and Rhythm: Normal rate and regular rhythm.   Pulmonary:      Effort: Pulmonary effort is normal.      Breath sounds: Normal breath sounds.   Abdominal:      General: Abdomen is flat.      Palpations: Abdomen is soft.   Musculoskeletal:         General: Normal range of motion.      Cervical back: Normal range of motion.   Skin:     General: Skin is warm and dry.   Neurological:      General: No focal deficit present.      Mental Status: He is alert.   Psychiatric:         Mood and Affect: Mood normal.       Lab Results   Component Value Date    CREATININE 0.87 09/08/2023    K 4.1 09/08/2023    GLUCOSE 85 09/08/2023    HCT 30.8 (L) 09/08/2023    WBC 5.0 09/08/2023     09/08/2023    CALCIUM 9.2 09/08/2023     Assessment/Plan   Excellent renal allograft function. Continue current immunosuppression.

## 2023-10-19 ENCOUNTER — OFFICE VISIT (OUTPATIENT)
Dept: UROLOGY | Facility: CLINIC | Age: 73
End: 2023-10-19
Payer: COMMERCIAL

## 2023-10-19 VITALS — TEMPERATURE: 97.1 F | BODY MASS INDEX: 27.84 KG/M2 | HEIGHT: 71 IN

## 2023-10-19 DIAGNOSIS — N13.8 BENIGN PROSTATIC HYPERPLASIA WITH URINARY OBSTRUCTION: Primary | ICD-10-CM

## 2023-10-19 DIAGNOSIS — N40.1 BENIGN PROSTATIC HYPERPLASIA WITH URINARY OBSTRUCTION: Primary | ICD-10-CM

## 2023-10-19 DIAGNOSIS — N32.0 BLADDER NECK CONTRACTURE: ICD-10-CM

## 2023-10-19 PROCEDURE — 4010F ACE/ARB THERAPY RXD/TAKEN: CPT | Performed by: STUDENT IN AN ORGANIZED HEALTH CARE EDUCATION/TRAINING PROGRAM

## 2023-10-19 PROCEDURE — 99204 OFFICE O/P NEW MOD 45 MIN: CPT | Performed by: STUDENT IN AN ORGANIZED HEALTH CARE EDUCATION/TRAINING PROGRAM

## 2023-10-19 PROCEDURE — 1159F MED LIST DOCD IN RCRD: CPT | Performed by: STUDENT IN AN ORGANIZED HEALTH CARE EDUCATION/TRAINING PROGRAM

## 2023-10-19 PROCEDURE — 3066F NEPHROPATHY DOC TX: CPT | Performed by: STUDENT IN AN ORGANIZED HEALTH CARE EDUCATION/TRAINING PROGRAM

## 2023-10-19 PROCEDURE — 1126F AMNT PAIN NOTED NONE PRSNT: CPT | Performed by: STUDENT IN AN ORGANIZED HEALTH CARE EDUCATION/TRAINING PROGRAM

## 2023-10-19 PROCEDURE — 1160F RVW MEDS BY RX/DR IN RCRD: CPT | Performed by: STUDENT IN AN ORGANIZED HEALTH CARE EDUCATION/TRAINING PROGRAM

## 2023-10-19 PROCEDURE — 1036F TOBACCO NON-USER: CPT | Performed by: STUDENT IN AN ORGANIZED HEALTH CARE EDUCATION/TRAINING PROGRAM

## 2023-10-19 ASSESSMENT — PAIN SCALES - GENERAL: PAINLEVEL: 0-NO PAIN

## 2023-10-19 NOTE — PROGRESS NOTES
Albert presents as a new patient for an evaluation.  The patient’s EMR has been reviewed.  Lives in [place], [place].  Previously evaluated by Dr. Sifuentes, Dr. Luo and Dr. Castle.  H/o BPH w/ LUTS, s/p HoLEP with Dr. Sifuentes (Jan 2023).  H/o BNC, urinary incontinence, urethral stricture, rUTIs, pyelonephritis.   H/o renal transplant. (2017)    Reports history of persistent LUTS and incontinence.   S/p HoLEP with Dr. Sifuentes (Jan 2023).   C/b PE, s/p IVC filter placement and removal.  Continues on xarelto for anticoagulation.  Since then has developed recurrent obstructive voiding symptoms.  S/p cystoscopy with Dr. Castle (10/11/23) and noted severe BNC.  Referred to me for consideration of reconstructive options.    SUBJECTIVE: HPI   TODAY (10/18/23)  Initially presented to urology with h/o urinary retention.  Was evaluated by Dr. Sifuentes and underwent cystoscopy. (Nov 2022).  Afterwards, he developed a UTI and has had persistent leakage.  The UTI was treated but the leakage has persisted since.   This did not improve despite HoLEP (Jan 2023).  States that his weak stream and leakage has gradually worsened since the procedure.     Recently admitted (09/02 - 09/08/23) for UTI.  CT A&P concerning for mild perinephric stranding and mild hydronephrosis of transplant kidney. Noted multiple new calcified bladder stones and circumferential wall thickening and pericystic fat stranding. Findings concerning for pyelonephritis and was given IV antibiotics. Urine cultures came back negative. Switched to ciprofloxacin for total 10d course (completed 09/12/23).    PMH of diabetes, HTN, cirrhosis 2/2 ETOH use and chronic hep C, SLE, ESRD 2/2 HTN and T2DM, s/p renal transplant (2017), PE, GERD, HLD.    Past medical, surgical, family and social history in the chart was reviewed and is accurate including any additions to what is in this HPI.    Review of Systems   Constitutional: denies any unintentional weight loss or change in  strength.  Integumentary: denies any rashes or pruritus.  Eyes: denies any double vision or eye pain.  Ear/Nose/Mouth/Throat: denies any nosebleeds or gum bleeds.  Cardiovascular: denies any chest pain or syncope.  Respiratory: denies hemoptysis.  Gastrointestinal: denies nausea or vomiting.  Musculoskeletal: denies muscle cramping or weakness.  Neurologic: denies convulsions or seizures.  Hematologic/Lymphatic: denies bleeding tendencies.  Endocrine: denies heat/cold intolerance.  All other systems have been reviewed and are negative unless otherwise noted in the HPI.    OBJECTIVE:  Visit Vitals  Temp 36.2 °C (97.1 °F)     Physical Exam   Constitutional: No obvious distress.  Eyes: Non-injected conjunctiva, sclera clear, EOMI.  Ears/Nose/Mouth/Throat: No obvious drainage per ears or nose.  Cardiovascular: Extremities are warm and well perfused. No edema, cyanosis or pallor.  Respiratory: No audible wheezing/stridor; respirations do not appear labored.  Gastrointestinal: Abdomen soft, not distended.  Musculoskeletal: Normal ROM of extremities.  Skin: No obvious rashes or open sores.  Neurologic: Alert and oriented, CN 2-12 grossly intact.  Psychiatric: Answers questions appropriately with normal affect.  Hematologic/Lymphatic/Immunologic: No obvious bruises or sites of spontaneous bleeding.  Genitourinary: No CVA tenderness, bladder not palpable.     Labs and imaging:  Lab Results   Component Value Date    WBC 5.0 09/08/2023    HGB 10.4 (L) 09/08/2023    HCT 30.8 (L) 09/08/2023     09/08/2023    CHOL 94 01/21/2023    TRIG 100 01/21/2023    HDL 26.3 (A) 01/21/2023    ALT 14 09/02/2023    AST 21 09/02/2023     09/08/2023    K 4.1 09/08/2023     09/08/2023    CREATININE 0.87 09/08/2023    BUN 13 09/08/2023    CO2 26 09/08/2023    PSA 0.80 06/29/2020    INR 5.1 (AA) 04/04/2023    HGBA1C 6.2 07/17/2023     ASSESSMENT:  Problem List Items Addressed This Visit       Benign prostatic hyperplasia with  urinary obstruction - Primary    Relevant Orders    Measure post void residual (Completed)    Follow Up In Urology     Other Visit Diagnoses       Bladder neck contracture        Relevant Orders    Follow Up In Urology     PLAN:  Discussed potential treatment options including BNI.  Risks, benefits and complications were reviewed.   Post-operative course and expectations were discussed.  Discussed he would require PAT and cardiac clearance prior to proceeding.   Will continue to coordinate multidisciplinary care.   Will follow up to confirm surgical plans once has been cleared.    All questions were answered to the patient’s satisfaction.  Patient agrees with the plan and wishes to proceed.    I spent >45 minutes of dedicated E&M time, including preparation and review of records, notes, and data, time spent with patient/family, and documentation. 12:58 PM    Scribed for Dr. Joaquin Gonzales by Hai Segovia.  I, Dr. Joaquin Gonzales have personally reviewed and agreed with the information entered by the Virtual Scribe. 10/19/23.

## 2023-10-23 ENCOUNTER — APPOINTMENT (OUTPATIENT)
Dept: CARDIOLOGY | Facility: CLINIC | Age: 73
End: 2023-10-23
Payer: COMMERCIAL

## 2023-11-02 ENCOUNTER — HOSPITAL ENCOUNTER (OUTPATIENT)
Dept: RADIOLOGY | Facility: HOSPITAL | Age: 73
Discharge: HOME | End: 2023-11-02
Payer: COMMERCIAL

## 2023-11-02 ENCOUNTER — HOSPITAL ENCOUNTER (OUTPATIENT)
Dept: CARDIOLOGY | Facility: HOSPITAL | Age: 73
Discharge: HOME | End: 2023-11-02
Payer: COMMERCIAL

## 2023-11-02 DIAGNOSIS — I44.7 LEFT BUNDLE-BRANCH BLOCK, UNSPECIFIED: ICD-10-CM

## 2023-11-02 DIAGNOSIS — R07.89 OTHER CHEST PAIN: Primary | ICD-10-CM

## 2023-11-02 DIAGNOSIS — R07.9 CHEST PAIN, UNSPECIFIED: ICD-10-CM

## 2023-11-02 PROCEDURE — A9502 TC99M TETROFOSMIN: HCPCS | Performed by: INTERNAL MEDICINE

## 2023-11-02 PROCEDURE — 93016 CV STRESS TEST SUPVJ ONLY: CPT | Performed by: INTERNAL MEDICINE

## 2023-11-02 PROCEDURE — 93017 CV STRESS TEST TRACING ONLY: CPT

## 2023-11-02 PROCEDURE — 3430000001 HC RX 343 DIAGNOSTIC RADIOPHARMACEUTICALS: Performed by: INTERNAL MEDICINE

## 2023-11-02 PROCEDURE — 78452 HT MUSCLE IMAGE SPECT MULT: CPT

## 2023-11-02 PROCEDURE — 78452 HT MUSCLE IMAGE SPECT MULT: CPT | Performed by: RADIOLOGY

## 2023-11-02 PROCEDURE — 2500000004 HC RX 250 GENERAL PHARMACY W/ HCPCS (ALT 636 FOR OP/ED): Performed by: INTERNAL MEDICINE

## 2023-11-02 PROCEDURE — 93018 CV STRESS TEST I&R ONLY: CPT | Performed by: RADIOLOGY

## 2023-11-02 RX ORDER — REGADENOSON 0.08 MG/ML
0.4 INJECTION, SOLUTION INTRAVENOUS ONCE
Status: COMPLETED | OUTPATIENT
Start: 2023-11-02 | End: 2023-11-02

## 2023-11-02 RX ADMIN — TETROFOSMIN 31.1 MILLICURIE: 0.23 INJECTION, POWDER, LYOPHILIZED, FOR SOLUTION INTRAVENOUS at 10:34

## 2023-11-02 RX ADMIN — REGADENOSON 0.4 MG: 0.08 INJECTION, SOLUTION INTRAVENOUS at 10:56

## 2023-11-02 RX ADMIN — TETROFOSMIN 10.7 MILLICURIE: 0.23 INJECTION, POWDER, LYOPHILIZED, FOR SOLUTION INTRAVENOUS at 09:05

## 2023-11-03 DIAGNOSIS — R06.09 DOE (DYSPNEA ON EXERTION): ICD-10-CM

## 2023-11-03 DIAGNOSIS — I42.9 CARDIOMYOPATHY, UNSPECIFIED TYPE (MULTI): Primary | ICD-10-CM

## 2023-11-06 ENCOUNTER — TELEPHONE (OUTPATIENT)
Dept: CARDIOLOGY | Facility: CLINIC | Age: 73
End: 2023-11-06
Payer: COMMERCIAL

## 2023-11-06 NOTE — TELEPHONE ENCOUNTER
----- Message from Fran Jung MD sent at 11/3/2023 10:31 AM EDT -----  Notify patient stress test showed no reduced blood flow to heart and heart strength looks improved. Please have him schedule limited echo and FU (can be same day if possible).

## 2023-11-06 NOTE — TELEPHONE ENCOUNTER
Called and spoke with pts daughter, notified pts stress test showed no reduced blood flow to heart and heart strength looks improved per Dr. Suarez. Instructed to schedule limited echo and FU office visit, provided ph #[phone] for scheduling. Understanding was verbalized.

## 2023-11-08 ENCOUNTER — HOSPITAL ENCOUNTER (EMERGENCY)
Facility: HOSPITAL | Age: 73
Discharge: HOME | End: 2023-11-09
Attending: EMERGENCY MEDICINE
Payer: COMMERCIAL

## 2023-11-08 DIAGNOSIS — R39.15 URGENCY OF URINATION: Primary | ICD-10-CM

## 2023-11-08 LAB — GLUCOSE BLD MANUAL STRIP-MCNC: 170 MG/DL (ref 74–99)

## 2023-11-08 PROCEDURE — 99283 EMERGENCY DEPT VISIT LOW MDM: CPT

## 2023-11-08 PROCEDURE — 82947 ASSAY GLUCOSE BLOOD QUANT: CPT

## 2023-11-08 PROCEDURE — 51798 US URINE CAPACITY MEASURE: CPT

## 2023-11-08 PROCEDURE — 99285 EMERGENCY DEPT VISIT HI MDM: CPT | Performed by: EMERGENCY MEDICINE

## 2023-11-08 ASSESSMENT — LIFESTYLE VARIABLES
EVER FELT BAD OR GUILTY ABOUT YOUR DRINKING: NO
HAVE PEOPLE ANNOYED YOU BY CRITICIZING YOUR DRINKING: NO
EVER HAD A DRINK FIRST THING IN THE MORNING TO STEADY YOUR NERVES TO GET RID OF A HANGOVER: NO
HAVE YOU EVER FELT YOU SHOULD CUT DOWN ON YOUR DRINKING: NO
REASON UNABLE TO ASSESS: NO

## 2023-11-08 ASSESSMENT — PAIN - FUNCTIONAL ASSESSMENT: PAIN_FUNCTIONAL_ASSESSMENT: 0-10

## 2023-11-08 ASSESSMENT — COLUMBIA-SUICIDE SEVERITY RATING SCALE - C-SSRS
6. HAVE YOU EVER DONE ANYTHING, STARTED TO DO ANYTHING, OR PREPARED TO DO ANYTHING TO END YOUR LIFE?: NO
2. HAVE YOU ACTUALLY HAD ANY THOUGHTS OF KILLING YOURSELF?: NO
1. IN THE PAST MONTH, HAVE YOU WISHED YOU WERE DEAD OR WISHED YOU COULD GO TO SLEEP AND NOT WAKE UP?: NO

## 2023-11-08 ASSESSMENT — PAIN SCALES - GENERAL: PAINLEVEL_OUTOF10: 9

## 2023-11-09 VITALS
SYSTOLIC BLOOD PRESSURE: 182 MMHG | RESPIRATION RATE: 18 BRPM | WEIGHT: 200 LBS | HEART RATE: 68 BPM | BODY MASS INDEX: 28 KG/M2 | DIASTOLIC BLOOD PRESSURE: 97 MMHG | HEIGHT: 71 IN | OXYGEN SATURATION: 100 % | TEMPERATURE: 98.2 F

## 2023-11-09 LAB
ALBUMIN SERPL BCP-MCNC: 3.8 G/DL (ref 3.4–5)
ALP SERPL-CCNC: 47 U/L (ref 33–136)
ALT SERPL W P-5'-P-CCNC: 5 U/L (ref 10–52)
ANION GAP SERPL CALC-SCNC: 15 MMOL/L (ref 10–20)
APPEARANCE UR: CLEAR
AST SERPL W P-5'-P-CCNC: 18 U/L (ref 9–39)
BACTERIA UR CULT: NORMAL
BASOPHILS # BLD AUTO: 0.06 X10*3/UL (ref 0–0.1)
BASOPHILS NFR BLD AUTO: 0.7 %
BILIRUB SERPL-MCNC: 0.7 MG/DL (ref 0–1.2)
BILIRUB UR STRIP.AUTO-MCNC: NEGATIVE MG/DL
BUN SERPL-MCNC: 16 MG/DL (ref 6–23)
CALCIUM SERPL-MCNC: 9.5 MG/DL (ref 8.6–10.6)
CHLORIDE SERPL-SCNC: 108 MMOL/L (ref 98–107)
CO2 SERPL-SCNC: 20 MMOL/L (ref 21–32)
COLOR UR: YELLOW
CREAT SERPL-MCNC: 1.02 MG/DL (ref 0.5–1.3)
EOSINOPHIL # BLD AUTO: 0.07 X10*3/UL (ref 0–0.4)
EOSINOPHIL NFR BLD AUTO: 0.9 %
ERYTHROCYTE [DISTWIDTH] IN BLOOD BY AUTOMATED COUNT: 18 % (ref 11.5–14.5)
GFR SERPL CREATININE-BSD FRML MDRD: 78 ML/MIN/1.73M*2
GLUCOSE SERPL-MCNC: 172 MG/DL (ref 74–99)
GLUCOSE UR STRIP.AUTO-MCNC: NEGATIVE MG/DL
HCT VFR BLD AUTO: 34.2 % (ref 41–52)
HGB BLD-MCNC: 11.5 G/DL (ref 13.5–17.5)
HOLD SPECIMEN: NORMAL
IMM GRANULOCYTES # BLD AUTO: 0.07 X10*3/UL (ref 0–0.5)
IMM GRANULOCYTES NFR BLD AUTO: 0.9 % (ref 0–0.9)
KETONES UR STRIP.AUTO-MCNC: NEGATIVE MG/DL
LEUKOCYTE ESTERASE UR QL STRIP.AUTO: ABNORMAL
LYMPHOCYTES # BLD AUTO: 2.4 X10*3/UL (ref 0.8–3)
LYMPHOCYTES NFR BLD AUTO: 29.6 %
MCH RBC QN AUTO: 26.1 PG (ref 26–34)
MCHC RBC AUTO-ENTMCNC: 33.6 G/DL (ref 32–36)
MCV RBC AUTO: 78 FL (ref 80–100)
MONOCYTES # BLD AUTO: 0.82 X10*3/UL (ref 0.05–0.8)
MONOCYTES NFR BLD AUTO: 10.1 %
MUCOUS THREADS #/AREA URNS AUTO: ABNORMAL /LPF
NEUTROPHILS # BLD AUTO: 4.7 X10*3/UL (ref 1.6–5.5)
NEUTROPHILS NFR BLD AUTO: 57.8 %
NITRITE UR QL STRIP.AUTO: NEGATIVE
NRBC BLD-RTO: 0 /100 WBCS (ref 0–0)
PH UR STRIP.AUTO: 6 [PH]
PLATELET # BLD AUTO: 326 X10*3/UL (ref 150–450)
POTASSIUM SERPL-SCNC: 4 MMOL/L (ref 3.5–5.3)
PROT SERPL-MCNC: 7.7 G/DL (ref 6.4–8.2)
PROT UR STRIP.AUTO-MCNC: ABNORMAL MG/DL
RBC # BLD AUTO: 4.41 X10*6/UL (ref 4.5–5.9)
RBC # UR STRIP.AUTO: ABNORMAL /UL
RBC #/AREA URNS AUTO: >20 /HPF
SODIUM SERPL-SCNC: 139 MMOL/L (ref 136–145)
SP GR UR STRIP.AUTO: 1.02
SQUAMOUS #/AREA URNS AUTO: ABNORMAL /HPF
UROBILINOGEN UR STRIP.AUTO-MCNC: <2 MG/DL
WBC # BLD AUTO: 8.1 X10*3/UL (ref 4.4–11.3)
WBC #/AREA URNS AUTO: ABNORMAL /HPF

## 2023-11-09 PROCEDURE — 2500000001 HC RX 250 WO HCPCS SELF ADMINISTERED DRUGS (ALT 637 FOR MEDICARE OP)

## 2023-11-09 PROCEDURE — 2500000004 HC RX 250 GENERAL PHARMACY W/ HCPCS (ALT 636 FOR OP/ED)

## 2023-11-09 PROCEDURE — 80053 COMPREHEN METABOLIC PANEL: CPT

## 2023-11-09 PROCEDURE — 87086 URINE CULTURE/COLONY COUNT: CPT

## 2023-11-09 PROCEDURE — 81001 URINALYSIS AUTO W/SCOPE: CPT

## 2023-11-09 PROCEDURE — 2500000004 HC RX 250 GENERAL PHARMACY W/ HCPCS (ALT 636 FOR OP/ED): Performed by: EMERGENCY MEDICINE

## 2023-11-09 PROCEDURE — 85025 COMPLETE CBC W/AUTO DIFF WBC: CPT

## 2023-11-09 PROCEDURE — 36415 COLL VENOUS BLD VENIPUNCTURE: CPT

## 2023-11-09 RX ORDER — TACROLIMUS 1 MG/1
1 CAPSULE ORAL
Status: DISCONTINUED | OUTPATIENT
Start: 2023-11-09 | End: 2023-11-09

## 2023-11-09 RX ORDER — PHENAZOPYRIDINE HYDROCHLORIDE 100 MG/1
95 TABLET, FILM COATED ORAL ONCE
Status: COMPLETED | OUTPATIENT
Start: 2023-11-09 | End: 2023-11-09

## 2023-11-09 RX ORDER — PHENAZOPYRIDINE HYDROCHLORIDE 100 MG/1
95 TABLET, FILM COATED ORAL
Status: DISCONTINUED | OUTPATIENT
Start: 2023-11-09 | End: 2023-11-09

## 2023-11-09 RX ORDER — MYCOPHENOLATE MOFETIL 250 MG/1
250 CAPSULE ORAL ONCE
Status: COMPLETED | OUTPATIENT
Start: 2023-11-09 | End: 2023-11-09

## 2023-11-09 RX ORDER — PHENAZOPYRIDINE HYDROCHLORIDE 200 MG/1
200 TABLET, FILM COATED ORAL 3 TIMES DAILY
Qty: 6 TABLET | Refills: 0 | Status: SHIPPED | OUTPATIENT
Start: 2023-11-09 | End: 2023-11-16 | Stop reason: ALTCHOICE

## 2023-11-09 RX ORDER — TACROLIMUS 1 MG/1
1 CAPSULE ORAL ONCE
Status: COMPLETED | OUTPATIENT
Start: 2023-11-09 | End: 2023-11-09

## 2023-11-09 RX ADMIN — MYCOPHENOLATE MOFETIL 250 MG: 250 CAPSULE ORAL at 03:25

## 2023-11-09 RX ADMIN — TACROLIMUS 1 MG: 1 CAPSULE ORAL at 03:25

## 2023-11-09 RX ADMIN — MYCOPHENOLATE MOFETIL 250 MG: 250 CAPSULE ORAL at 01:47

## 2023-11-09 RX ADMIN — PHENAZOPYRIDINE 100 MG: 100 TABLET ORAL at 05:55

## 2023-11-09 NOTE — ED PROVIDER NOTES
CC: Difficulty Urinating     HPI:  This patient is a 72-year-old male with significant urological history including history of BPH with LUTS, s/p HoLEP in 01/2023, history of bladder neck contracture, urinary incontinence, urethral strictures, recurrent UTIs, and pyelonephritis as well as a history of a renal transplant in 2017 who presents emergency department with difficulty urinating.  States that since his procedure back in January he has struggled with obstructing symptoms including urgency and painful urination.  States the current episode has been going on for about 1 week.  States he has intermittent pain and urgency with inability to urinate.  He will occasionally be able to urinate, especially while defecating.  He presents now due to pain and only dribbling of urine.  Denies any burning with urination, any fevers, or back pain.  Has been eating and drinking well without any nausea or vomiting.  Denies any other symptoms including headache, lightheadedness, dizziness, chest pain, shortness of breath, abdominal pain.  States that he is supposed to have another upcoming procedure on 11/16.    Limitations to history: None  Independent historian(s): Patient and his daughter at bedside  Records Reviewed: Recent available ED and inpatient notes reviewed in EMR.    PMHx/PSHx:  Per HPI.   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), Alcohol dependence, uncomplicated (CMS/HCC) (01/18/2018), Alcohol dependence, uncomplicated (CMS/HCC) (01/18/2018), Alcohol dependence, uncomplicated (CMS/HCC) (01/18/2018), Alcohol dependence, uncomplicated (CMS/HCC) (01/18/2018), Alcoholic cirrhosis of liver with ascites (CMS/HCC) (10/05/2017), Awaiting organ transplant status (01/18/2018), Awaiting organ transplant status (01/18/2018), Awaiting organ transplant status (01/18/2018), Awaiting organ transplant status (01/18/2018), Benign prostatic hyperplasia with lower  urinary tract symptoms (01/18/2018), Benign prostatic hyperplasia with lower urinary tract symptoms (01/18/2018), Benign prostatic hyperplasia with lower urinary tract symptoms (01/18/2018), Benign prostatic hyperplasia with lower urinary tract symptoms (01/18/2018), Chronic diastolic (congestive) heart failure (CMS/HCC) (01/18/2018), Chronic diastolic (congestive) heart failure (CMS/HCC) (01/18/2018), Chronic diastolic (congestive) heart failure (CMS/HCC) (01/18/2018), Chronic diastolic (congestive) heart failure (CMS/HCC) (01/18/2018), Chronic kidney disease, stage 3 unspecified (CMS/HCC) (01/18/2018), Chronic kidney disease, stage 3 unspecified (CMS/HCC) (01/18/2018), Chronic kidney disease, stage 3 unspecified (CMS/HCC) (01/18/2018), Chronic kidney disease, stage 3 unspecified (CMS/HCC) (01/18/2018), Chronic kidney disease, stage 4 (severe) (CMS/HCC) (10/20/2016), Chronic viral hepatitis C (CMS/HCC) (01/18/2018), Chronic viral hepatitis C (CMS/HCC) (01/18/2018), Constipation, unspecified (01/18/2018), Constipation, unspecified (01/18/2018), Constipation, unspecified (01/18/2018), Constipation, unspecified (01/18/2018), Disorder of kidney and ureter, unspecified (01/18/2018), Disorder of kidney and ureter, unspecified (01/18/2018), Disorder of kidney and ureter, unspecified (01/18/2018), Disorder of kidney and ureter, unspecified (01/18/2018), Disorder of male genital organs, unspecified (12/11/2018), Disorder of male genital organs, unspecified (01/18/2018), Disorder of male genital organs, unspecified (01/18/2018), Disorder of male genital organs, unspecified (01/18/2018), Disorder of male genital organs, unspecified (01/18/2018), Encounter for other preprocedural examination (01/18/2018), Encounter for other preprocedural examination (01/18/2018), Encounter for other preprocedural examination (01/18/2018), Encounter for other preprocedural examination (01/18/2018), Encounter for other preprocedural examination  (01/18/2018), Encounter for other preprocedural examination (01/18/2018), Encounter for other preprocedural examination (01/18/2018), Encounter for other preprocedural examination (01/18/2018), Encounter for other preprocedural examination (01/18/2018), Encounter for other preprocedural examination (01/18/2018), Encounter for other preprocedural examination (01/18/2018), Encounter for other preprocedural examination (01/18/2018), Encounter for screening for malignant neoplasm of prostate (01/18/2018), Encounter for screening for malignant neoplasm of prostate (01/18/2018), Encounter for screening for malignant neoplasm of prostate (01/18/2018), Encounter for screening for malignant neoplasm of prostate (01/18/2018), End stage renal disease (CMS/HCC) (01/18/2018), End stage renal disease (CMS/HCC) (01/18/2018), Essential (primary) hypertension (01/27/2020), Immunodeficiency, unspecified (CMS/HCC) (01/18/2018), Immunodeficiency, unspecified (CMS/HCC) (01/18/2018), Immunodeficiency, unspecified (CMS/HCC) (01/18/2018), Immunodeficiency, unspecified (CMS/HCC) (01/18/2018), Kidney transplant status (01/18/2018), Kidney transplant status (01/18/2018), Liver disease, unspecified (01/18/2018), Liver disease, unspecified (01/18/2018), Liver disease, unspecified (01/18/2018), Liver disease, unspecified (01/18/2018), Other ascites (11/07/2022), Other conditions influencing health status (01/18/2018), Other conditions influencing health status (01/18/2018), Other conditions influencing health status (01/18/2018), Other conditions influencing health status (01/18/2018), Other ill-defined heart diseases (01/18/2018), Other ill-defined heart diseases (01/18/2018), Other ill-defined heart diseases (01/18/2018), Other ill-defined heart diseases (01/18/2018), Other specified personal risk factors, not elsewhere classified (01/18/2018), Other specified personal risk factors, not elsewhere classified (01/18/2018), Other specified  personal risk factors, not elsewhere classified (01/18/2018), Other specified personal risk factors, not elsewhere classified (01/18/2018), Pain in left hand (01/18/2018), Pain in left hand (01/18/2018), Pain in left hand (01/18/2018), Pain in left hand (01/18/2018), Peripheral vascular disease, unspecified (CMS/HCC) (01/18/2018), Peripheral vascular disease, unspecified (CMS/HCC) (01/18/2018), Peripheral vascular disease, unspecified (CMS/HCC) (01/18/2018), Peripheral vascular disease, unspecified (CMS/HCC) (01/18/2018), Personal history of other diseases of the circulatory system (08/18/2014), Personal history of other diseases of the musculoskeletal system and connective tissue, Pruritus, unspecified (02/21/2018), Type 2 diabetes mellitus without complications (CMS/HCC) (09/01/2022), Unspecified cirrhosis of liver (CMS/HCC) (01/18/2018), Unspecified cirrhosis of liver (CMS/HCC) (01/18/2018), Unspecified cirrhosis of liver (CMS/HCC) (01/18/2018), Unspecified cirrhosis of liver (CMS/HCC) (01/18/2018), Unspecified complication of cardiac and vascular prosthetic device, implant and graft, initial encounter (01/18/2018), Unspecified complication of cardiac and vascular prosthetic device, implant and graft, initial encounter (01/18/2018), Unspecified complication of cardiac and vascular prosthetic device, implant and graft, initial encounter (01/18/2018), Unspecified complication of cardiac and vascular prosthetic device, implant and graft, initial encounter (01/18/2018), Unspecified visual disturbance (01/18/2018), Unspecified visual disturbance (01/18/2018), Unspecified visual disturbance (01/18/2018), and Unspecified visual disturbance (01/18/2018).  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); Kidney surgery (06/02/2020); Other surgical history (06/30/2020); Cataract extraction (01/18/2018); Other surgical history (06/02/2020); US guided abdominal  paracentesis (3/20/2014); US guided abdominal paracentesis (8/14/2014); US guided abdominal paracentesis (11/17/2014); US guided abdominal paracentesis (12/11/2014); US guided abdominal paracentesis (1/27/2015); US guided abdominal paracentesis (2/19/2015); US guided abdominal paracentesis (3/18/2015); FL guided abdominal paracentesis (3/30/2015); FL guided abdominal paracentesis (4/14/2015); US guided abdominal paracentesis (4/17/2015); US guided abdominal paracentesis (7/14/2015); FL guided abdominal paracentesis (8/28/2015); US guided abdominal paracentesis (10/12/2015); and US guided abdominal paracentesis (10/19/2015).    Medications:  Reviewed in EMR. See EMR for complete list of medications and doses.    Allergies:  Penicillins, Ace inhibitors, Arb-angiotensin receptor antagonist, Influenza tri-split 2007 vac, Oxycodone-acetaminophen, Penicillin, and Ceftriaxone    Social History:  - Tobacco:  reports that he has never smoked. He has never used smokeless tobacco.   - Alcohol:  reports no history of alcohol use.   - Illicit Drugs:  reports no history of drug use.     ROS:  Per HPI.       ???????????????????????????????????????????????????????????????  Triage Vitals:  T 36.8 °C (98.2 °F)  HR 78  /71  RR 18  O2 96 %      Physical Exam    General: Male patient lying on his side in the fetal position, appears mildly uncomfortable however in no acute distress, breathing easily, appropriately conversational without confusion or mental status changes.  Head: Normocephalic. Atraumatic.  Neck:  FROM. No gross masses.   Eyes: No scleral icterus or injection.  ENT: Moist mucous membranes, no apparent trauma or lesions.  CV: Regular rhythm. No murmurs, rubs, gallops appreciated. 2+ radial pulses bilaterally.  Resp: Clear to auscultation bilaterally. No respiratory distress.   GI: Soft, non-distended.  No tenderness with palpation.    : Mild suprapubic tenderness to palpation.  EXT: No peripheral edema,  contusions, or wounds.  Skin: Warm and dry, no rashes or lesions.  Neuro: Alert and oriented.  Cranial nerves II-XII grossly intact.  No focal neurological deficits.  Motor and sensation intact throughout.  Speech fluent.  Psych: Appropriate mood and behavior, converses and responds appropriately.    ???????????????????????????????????????????????????????????????  Labs:   Labs Reviewed   CBC WITH AUTO DIFFERENTIAL - Abnormal       Result Value    WBC 8.1      nRBC 0.0      RBC 4.41 (*)     Hemoglobin 11.5 (*)     Hematocrit 34.2 (*)     MCV 78 (*)     MCH 26.1      MCHC 33.6      RDW 18.0 (*)     Platelets 326      Neutrophils % 57.8      Immature Granulocytes %, Automated 0.9      Lymphocytes % 29.6      Monocytes % 10.1      Eosinophils % 0.9      Basophils % 0.7      Neutrophils Absolute 4.70      Immature Granulocytes Absolute, Automated 0.07      Lymphocytes Absolute 2.40      Monocytes Absolute 0.82 (*)     Eosinophils Absolute 0.07      Basophils Absolute 0.06     COMPREHENSIVE METABOLIC PANEL - Abnormal    Glucose 172 (*)     Sodium 139      Potassium 4.0      Chloride 108 (*)     Bicarbonate 20 (*)     Anion Gap 15      Urea Nitrogen 16      Creatinine 1.02      eGFR 78      Calcium 9.5      Albumin 3.8      Alkaline Phosphatase 47      Total Protein 7.7      AST 18      Bilirubin, Total 0.7      ALT 5 (*)    URINALYSIS WITH REFLEX MICROSCOPIC AND CULTURE - Abnormal    Color, Urine Yellow      Appearance, Urine Clear      Specific Gravity, Urine 1.017      pH, Urine 6.0      Protein, Urine 100 (2+) (*)     Glucose, Urine NEGATIVE      Blood, Urine MODERATE (2+) (*)     Ketones, Urine NEGATIVE      Bilirubin, Urine NEGATIVE      Urobilinogen, Urine <2.0      Nitrite, Urine NEGATIVE      Leukocyte Esterase, Urine TRACE (*)    MICROSCOPIC ONLY, URINE - Abnormal    WBC, Urine 6-10 (*)     RBC, Urine >20 (*)     Squamous Epithelial Cells, Urine 1-9 (SPARSE)      Mucus, Urine 1+     POCT GLUCOSE - Abnormal     POCT Glucose 170 (*)    URINE CULTURE   URINALYSIS WITH REFLEX MICROSCOPIC AND CULTURE    Narrative:     The following orders were created for panel order Urinalysis with Reflex Microscopic and Culture.  Procedure                               Abnormality         Status                     ---------                               -----------         ------                     Urinalysis with Reflex M...[[id number]]  Abnormal            Final result               Extra Urine Gray Tube[[id number]]                            Final result                 Please view results for these tests on the individual orders.   EXTRA URINE GRAY TUBE    Extra Tube Hold for add-ons.          Imaging:   No orders to display        EKG:  None    MDM:  This patient is a 72-year-old male with significant urological history who presents to the emergency department with pain and difficulty urinating.  He is hemodynamically stable and in no respiratory distress on arrival.  His physical exam is significant for mild suprapubic tenderness to palpation however otherwise unremarkable.  Concern at this time for urinary retention due to obstruction versus urinary tract infection versus continued urologic complications.  Patient states that he cannot have a Walsh catheter placed due to his urologic complications.  Bladder scan obtained showing only 50 mL of urine.  Patient did have urgency to use the restroom and did urinate.  His symptoms seem to be somewhat improved after this.  I did discuss the patient with urology who is most concerned for urinary tract infection given his history.  Urine sample obtained and does not show signs of infection.  Urology recommending trialing pyridium for bladder comfort however states there is nothing else to do at this time until his next bladder procedure which is supposed to be on 11/16.  At this time the patient is appropriate for discharge home.  They will arrange for follow-up in the next few days.  We will  provide a prescription for Pyridium for the patient.  He expresses understanding and agreed with this plan.  He remained hemodynamically stable be discharged home.    ED Course:  ED Course as of 11/09/23 0648   Thu Nov 09, 2023   0110 Discussed with urology. Will obtain UA and blood work to assess for UTI. Will treat as UTI with outpatient follow up if positive. If negative will call urology back for further plan. Will check renal function and treat with pyridium if adequate. [VM]   0419 No signs of UTI, urology recommending pyridium and will arrange for sooner follow within the next few days. [VM]      ED Course User Index  [VM] Parvez Armas DO         Diagnoses as of 11/09/23 0648   Urgency of urination       Social Determinants Limiting Care:      Disposition:  Discharge    Parvez Armas DO   Emergency Medicine PGY-2  [facility]      Procedures ? SmartLinks last updated 11/9/2023 4:10 AM        Parvez Armas DO  Resident  11/09/23 0649

## 2023-11-09 NOTE — DISCHARGE INSTRUCTIONS
The urology team should be contacting you to set up an appointment in the next couple of days. Return to the emergency department for any new or worsening symptoms or for any other concerns.

## 2023-11-09 NOTE — ED TRIAGE NOTES
Enters ED reporting dysuria that began today. Pt rates penile pain 9/10. Denies penile discharge or trauma. Baseline history of BPH w/ LUTS, s/p HoLEP with Dr. Sifuentes (Jan 2023),  BNC, urinary incontinence, urethral stricture, recurrent UTIs, pyelonephritis, renal transplant (2017).

## 2023-11-16 ENCOUNTER — HOSPITAL ENCOUNTER (OUTPATIENT)
Facility: HOSPITAL | Age: 73
Setting detail: OUTPATIENT SURGERY
End: 2023-11-16
Attending: STUDENT IN AN ORGANIZED HEALTH CARE EDUCATION/TRAINING PROGRAM | Admitting: STUDENT IN AN ORGANIZED HEALTH CARE EDUCATION/TRAINING PROGRAM
Payer: COMMERCIAL

## 2023-11-16 ENCOUNTER — OFFICE VISIT (OUTPATIENT)
Dept: UROLOGY | Facility: CLINIC | Age: 73
End: 2023-11-16
Payer: COMMERCIAL

## 2023-11-16 DIAGNOSIS — N40.1 BENIGN PROSTATIC HYPERPLASIA WITH URINARY OBSTRUCTION: ICD-10-CM

## 2023-11-16 DIAGNOSIS — N13.8 BENIGN PROSTATIC HYPERPLASIA WITH URINARY OBSTRUCTION: ICD-10-CM

## 2023-11-16 DIAGNOSIS — N30.90 RECURRENT CYSTITIS: ICD-10-CM

## 2023-11-16 DIAGNOSIS — N32.0 BLADDER NECK CONTRACTURE: Primary | ICD-10-CM

## 2023-11-16 DIAGNOSIS — K76.6 PORTAL HYPERTENSION (MULTI): ICD-10-CM

## 2023-11-16 DIAGNOSIS — N39.41 URGE INCONTINENCE OF URINE: ICD-10-CM

## 2023-11-16 DIAGNOSIS — D84.821 IMMUNODEFICIENCY DUE TO DRUGS (CODE) (MULTI): ICD-10-CM

## 2023-11-16 LAB
POC APPEARANCE, URINE: CLEAR
POC BILIRUBIN, URINE: NEGATIVE
POC BLOOD, URINE: NEGATIVE
POC COLOR, URINE: YELLOW
POC GLUCOSE, URINE: ABNORMAL MG/DL
POC KETONES, URINE: NEGATIVE MG/DL
POC LEUKOCYTES, URINE: NEGATIVE
POC NITRITE,URINE: POSITIVE
POC PH, URINE: 6 PH
POC PROTEIN, URINE: ABNORMAL MG/DL
POC SPECIFIC GRAVITY, URINE: 1.02
POC UROBILINOGEN, URINE: 1 EU/DL

## 2023-11-16 PROCEDURE — 3066F NEPHROPATHY DOC TX: CPT | Performed by: STUDENT IN AN ORGANIZED HEALTH CARE EDUCATION/TRAINING PROGRAM

## 2023-11-16 PROCEDURE — 99214 OFFICE O/P EST MOD 30 MIN: CPT | Performed by: STUDENT IN AN ORGANIZED HEALTH CARE EDUCATION/TRAINING PROGRAM

## 2023-11-16 PROCEDURE — 81002 URINALYSIS NONAUTO W/O SCOPE: CPT | Performed by: STUDENT IN AN ORGANIZED HEALTH CARE EDUCATION/TRAINING PROGRAM

## 2023-11-16 PROCEDURE — 1126F AMNT PAIN NOTED NONE PRSNT: CPT | Performed by: STUDENT IN AN ORGANIZED HEALTH CARE EDUCATION/TRAINING PROGRAM

## 2023-11-16 PROCEDURE — 1160F RVW MEDS BY RX/DR IN RCRD: CPT | Performed by: STUDENT IN AN ORGANIZED HEALTH CARE EDUCATION/TRAINING PROGRAM

## 2023-11-16 PROCEDURE — 1036F TOBACCO NON-USER: CPT | Performed by: STUDENT IN AN ORGANIZED HEALTH CARE EDUCATION/TRAINING PROGRAM

## 2023-11-16 PROCEDURE — 87086 URINE CULTURE/COLONY COUNT: CPT

## 2023-11-16 PROCEDURE — 1159F MED LIST DOCD IN RCRD: CPT | Performed by: STUDENT IN AN ORGANIZED HEALTH CARE EDUCATION/TRAINING PROGRAM

## 2023-11-16 PROCEDURE — 4010F ACE/ARB THERAPY RXD/TAKEN: CPT | Performed by: STUDENT IN AN ORGANIZED HEALTH CARE EDUCATION/TRAINING PROGRAM

## 2023-11-16 RX ORDER — CIPROFLOXACIN 2 MG/ML
400 INJECTION, SOLUTION INTRAVENOUS ONCE
Status: CANCELLED | OUTPATIENT
Start: 2023-11-16 | End: 2023-11-16

## 2023-11-16 RX ORDER — SODIUM CHLORIDE, SODIUM LACTATE, POTASSIUM CHLORIDE, CALCIUM CHLORIDE 600; 310; 30; 20 MG/100ML; MG/100ML; MG/100ML; MG/100ML
100 INJECTION, SOLUTION INTRAVENOUS CONTINUOUS
Status: CANCELLED | OUTPATIENT
Start: 2023-11-16

## 2023-11-16 RX ORDER — VANCOMYCIN HYDROCHLORIDE 1 G/200ML
1000 INJECTION, SOLUTION INTRAVENOUS ONCE
Status: CANCELLED | OUTPATIENT
Start: 2023-11-16 | End: 2023-11-16

## 2023-11-16 NOTE — PROGRESS NOTES
Albert presents for a FU visit.  The patient’s EMR has been reviewed.  Lives in [place], [place].  Previously evaluated by Dr. Sifuentes, Dr. Luo and Dr. Castle.  H/o BPH w/ LUTS, s/p HoLEP with Dr. Sifuentes (Jan 2023).  H/o BNC, urinary incontinence, urethral stricture, rUTIs, pyelonephritis.   H/o renal transplant. (2017)     H/o BPH with persistent LUTS.  S/p HoLEP with Dr. Sifuentes (Jan 2023).   C/b PE, s/p IVC filter placement and removal.  Continues on xarelto for anticoagulation.  Since then has developed recurrent obstructive voiding symptoms.  S/p cystoscopy with Dr. Castle (10/11/23) and noted severe BNC.  Complaining of weak stream and persistent leakage now.  Referred to me for consideration of reconstructive options.    SUBJECTIVE: HPI   TODAY (11/16/23)  C/o persistent LUTS.  Since been evaluated by Cardiology s/p stress test.   Cleared to schedule surgery.   Has follow up with Cardiology Nov 27th.     Not on any antibiotics currently.  No longer taking oxybutynin or Trospium.  Continues xarelto for anticoagulation.  Will be able to stop prior to any urologic procedure.     TO REVIEW: Initial evaluation (10/18/23)  Initially presented to urology with h/o urinary retention.  Was evaluated by Dr. Sifuentes and underwent cystoscopy. (Nov 2022).  Afterwards, he developed a UTI and has had persistent leakage.  The UTI was treated but the leakage has persisted since.   This did not improve despite HoLEP (Jan 2023).  States that his weak stream and leakage has gradually worsened since the procedure.      Recently admitted (09/02 - 09/08/23) for UTI.  CT A&P concerning for mild perinephric stranding and mild hydronephrosis of transplant kidney. Noted multiple new calcified bladder stones and circumferential wall thickening and pericystic fat stranding. Findings concerning for pyelonephritis and was given IV antibiotics. Urine cultures came back negative. Switched to ciprofloxacin for total 10d course (completed 09/12/23).      PMH of diabetes, HTN, cirrhosis 2/2 ETOH use and chronic hep C, SLE, ESRD 2/2 HTN and T2DM, s/p renal transplant (2017), PE, GERD, HLD.    Past medical, surgical, family and social history in the chart was reviewed and is accurate including any additions to what is in this HPI.    Review of Systems   Constitutional: denies any unintentional weight loss or change in strength.  Integumentary: denies any rashes or pruritus.  Eyes: denies any double vision or eye pain.  Ear/Nose/Mouth/Throat: denies any nosebleeds or gum bleeds.  Cardiovascular: denies any chest pain or syncope.  Respiratory: denies hemoptysis.  Gastrointestinal: denies nausea or vomiting.  Musculoskeletal: denies muscle cramping or weakness.  Neurologic: denies convulsions or seizures.  Hematologic/Lymphatic: denies bleeding tendencies.  Endocrine: denies heat/cold intolerance.  All other systems have been reviewed and are negative unless otherwise noted in the HPI.    OBJECTIVE:  There were no vitals taken for this visit.  Physical Exam   Constitutional: No obvious distress.  Eyes: Non-injected conjunctiva, sclera clear, EOMI.  Ears/Nose/Mouth/Throat: No obvious drainage per ears or nose.  Cardiovascular: Extremities are warm and well perfused. No edema, cyanosis or pallor.  Respiratory: No audible wheezing/stridor; respirations do not appear labored.  Gastrointestinal: Abdomen soft, not distended.  Musculoskeletal: Normal ROM of extremities.  Skin: No obvious rashes or open sores.  Neurologic: Alert and oriented, CN 2-12 grossly intact.  Psychiatric: Answers questions appropriately with normal affect.  Hematologic/Lymphatic/Immunologic: No obvious bruises or sites of spontaneous bleeding.  Genitourinary: No CVA tenderness, bladder not palpable.     Labs and imaging:  Lab Results   Component Value Date    WBC 8.1 11/09/2023    HGB 11.5 (L) 11/09/2023    HCT 34.2 (L) 11/09/2023     11/09/2023    CHOL 94 01/21/2023    TRIG 100 01/21/2023    HDL  26.3 (A) 01/21/2023    ALT 5 (L) 11/09/2023    AST 18 11/09/2023     11/09/2023    K 4.0 11/09/2023     (H) 11/09/2023    CREATININE 1.02 11/09/2023    BUN 16 11/09/2023    CO2 20 (L) 11/09/2023    PSA 0.80 06/29/2020    INR 5.1 (AA) 04/04/2023    HGBA1C 6.2 07/17/2023     ASSESSMENT:  Problem List Items Addressed This Visit       Benign prostatic hyperplasia with urinary obstruction    Relevant Orders    POCT UA (nonautomated) manually resulted    Portal hypertension (CMS/HCC)     Other Visit Diagnoses       Bladder neck contracture    -  Primary    Immunodeficiency due to drugs (CODE) (CMS/[facility])         PLAN:  Discussed potential treatment options including cystoscopy BNI, likely urethral dilation, any other indicated procedure  Risks, benefits and complications were reviewed.   Post-operative course and expectations were discussed.  Seen by cardiology recently for optimization, has upcoming limited echo but visit was reassuring   Will continue to coordinate multidisciplinary care.   Tentative OR date of Dec 15th, 2023 an  [facility] campus.    All questions were answered to the patient’s satisfaction.  Patient agrees with the plan and wishes to proceed.    Scribed for Dr. Joaquin Gonzales by Hai Segovia.  I, Dr. Joaquin Gonzales have personally reviewed and agreed with the information entered by the Virtual Scribe. 11/16/23.

## 2023-11-18 LAB — BACTERIA UR CULT: NO GROWTH

## 2023-11-24 ENCOUNTER — APPOINTMENT (OUTPATIENT)
Dept: CARDIOLOGY | Facility: CLINIC | Age: 73
End: 2023-11-24
Payer: COMMERCIAL

## 2023-11-24 ENCOUNTER — HOSPITAL ENCOUNTER (OUTPATIENT)
Dept: CARDIOLOGY | Facility: CLINIC | Age: 73
Discharge: HOME | End: 2023-11-24
Payer: COMMERCIAL

## 2023-11-24 ENCOUNTER — PHARMACY VISIT (OUTPATIENT)
Dept: PHARMACY | Facility: CLINIC | Age: 73
End: 2023-11-24
Payer: MEDICARE

## 2023-11-24 DIAGNOSIS — I42.9 CARDIOMYOPATHY, UNSPECIFIED TYPE (MULTI): ICD-10-CM

## 2023-11-24 DIAGNOSIS — R06.09 DOE (DYSPNEA ON EXERTION): ICD-10-CM

## 2023-11-24 PROBLEM — Z86.711 PERSONAL HISTORY OF PULMONARY EMBOLISM: Status: ACTIVE | Noted: 2023-09-08

## 2023-11-24 PROBLEM — M32.9 SYSTEMIC LUPUS ERYTHEMATOSUS, UNSPECIFIED (MULTI): Status: ACTIVE | Noted: 2023-09-08

## 2023-11-24 LAB
LEFT VENTRICULAR OUTFLOW TRACT DIAMETER: 2.13
MITRAL VALVE E/A RATIO: 0.65
MITRAL VALVE E/E' RATIO: 23.84
RIGHT VENTRICLE FREE WALL PEAK S': 8
RIGHT VENTRICLE PEAK SYSTOLIC PRESSURE: 28.8
TRICUSPID ANNULAR PLANE SYSTOLIC EXCURSION: 1.6

## 2023-11-24 PROCEDURE — 93308 TTE F-UP OR LMTD: CPT | Performed by: INTERNAL MEDICINE

## 2023-11-24 PROCEDURE — 93325 DOPPLER ECHO COLOR FLOW MAPG: CPT

## 2023-11-24 PROCEDURE — 93321 DOPPLER ECHO F-UP/LMTD STD: CPT | Performed by: INTERNAL MEDICINE

## 2023-11-24 PROCEDURE — RXMED WILLOW AMBULATORY MEDICATION CHARGE

## 2023-11-24 PROCEDURE — 93325 DOPPLER ECHO COLOR FLOW MAPG: CPT | Performed by: INTERNAL MEDICINE

## 2023-11-27 ENCOUNTER — TELEMEDICINE (OUTPATIENT)
Dept: CARDIOLOGY | Facility: CLINIC | Age: 73
End: 2023-11-27
Payer: COMMERCIAL

## 2023-11-27 VITALS
BODY MASS INDEX: 26.41 KG/M2 | SYSTOLIC BLOOD PRESSURE: 140 MMHG | HEART RATE: 72 BPM | WEIGHT: 195 LBS | DIASTOLIC BLOOD PRESSURE: 62 MMHG | HEIGHT: 72 IN

## 2023-11-27 DIAGNOSIS — M54.42 LOW BACK PAIN WITH BILATERAL SCIATICA, UNSPECIFIED BACK PAIN LATERALITY, UNSPECIFIED CHRONICITY: Primary | ICD-10-CM

## 2023-11-27 DIAGNOSIS — M54.41 LOW BACK PAIN WITH BILATERAL SCIATICA, UNSPECIFIED BACK PAIN LATERALITY, UNSPECIFIED CHRONICITY: Primary | ICD-10-CM

## 2023-11-27 PROCEDURE — 99213 OFFICE O/P EST LOW 20 MIN: CPT | Performed by: INTERNAL MEDICINE

## 2023-11-27 ASSESSMENT — PAIN SCALES - GENERAL: PAINLEVEL: 7

## 2023-11-27 ASSESSMENT — ENCOUNTER SYMPTOMS
LOSS OF SENSATION IN FEET: 0
HEMOPTYSIS: 0
FALLS: 0
CHILLS: 0
VOMITING: 0
BLOATING: 0
COUGH: 0
ALTERED MENTAL STATUS: 0
DYSURIA: 0
MYALGIAS: 0
DEPRESSION: 0
MEMORY LOSS: 0
DIARRHEA: 0
OCCASIONAL FEELINGS OF UNSTEADINESS: 1
ABDOMINAL PAIN: 0
WHEEZING: 0
HEADACHES: 0
CONSTIPATION: 0
FEVER: 0
NAUSEA: 0
HEMATURIA: 0

## 2023-11-27 NOTE — PROGRESS NOTES
Chief complaint:  FU     HPI  71 yo BM w/ h/o CAC, CM (resolved; likely stress induced), DVT/PE 1/23 (brief IVC filter), athero of Ao, HTN, DM, ESRD s/p renal transplant '15, BPH, ETOH/hep-C cirrhosis, TOB (quit), ETOH (quit) now on phone for cardiology FU.   1/23 hosp for CP after prostate surgery -> dx'ed with PE (also COVID+ and found to have CM).   No further CP. No dyspnea at rest. +ch TRIPLETT (mild-mod exertion). No orthopnea/PND. No palps. No LH/dizziness/syncope. No edema. No claudication. +ch occ cough. +sciatica.  ECG 9/16: SR (65), 1st deg AVB, QTc 488  ECG 9/17: SR (87), 1st deg AVB, QTc 524  ECG 1/20: SR (95), 1st deg AVB,  IMI, lat T-wave changes, QTc 469  ECG 11/22: SR (98), LVH w/ ST-T changes, IMI  ECG 1/23: SR (88), PVCs, LBBB  ECG 1/23: SR (85), LBBB  ECG 1/23: SR (72), 1st deg AVB, IVCD w/ ST-T changes, ?IMI, ?AMI  ECG 9/23: SR (73), 1st deg AVB, LBBB  Echo 7/14: EF 60-65%,  Echo 9/16: EF 60%, mild AS  Echo 1/23: EF 20-25%, DD, nl RV  Echo 11/23: EF 50-55%, DD  DSE 7/14: no ischemia, EF 60% -> 75%  DSE 9/16: no ischemia, EF 60-65% -> >80%  GLORIA 2/20: no ischemia, EF 65% -> 80%  Nuc 11/23: no ischemia, bas-mid inf/inflat scar, Ef 48%  CCS 1/20: 420 (, , LCx 0, RCA 0), nl heart size, no peric eff, AsAo 3.6cm  CXR 1/23: no acute abnl  CT chest 10/15: mod cor calc, mod athero of Ao, no aneurysm  CT chest 1/23: mild bibasilar subseg PE, mild cor calc, no peric eff, mild-mod athero of Ao, no aneurysm  CT chest 9/23: mod AA, nl heart size, no peric eff  CT ab 2/16: athero of Ao, no AAA  CT ab 1/19: nl Ao, no mention of cor calc  CT ab 1/23: CAC, mild CM, no peric eff, sm HH, athero of Ao, no AAA  CT ab 9/23: mild-mod AA + branches, no AAA  LE US 1/23: R occlusive PT DVT, L occlusive peroneal DVT  CT brain 1/23: no acute abnl   CT brain 9/23: no acute abnl    Review of Systems   Constitutional: Negative for chills, fever and malaise/fatigue.   HENT:  Negative for hearing loss.    Eyes:   "Negative for visual disturbance.   Respiratory:  Negative for cough, hemoptysis and wheezing.    Skin:  Negative for rash.   Musculoskeletal:  Negative for falls and myalgias.   Gastrointestinal:  Negative for bloating, abdominal pain, constipation, diarrhea, dysphagia, nausea and vomiting.   Genitourinary:  Negative for dysuria and hematuria.   Neurological:  Negative for headaches.   Psychiatric/Behavioral:  Negative for altered mental status, depression and memory loss.         Social History     Tobacco Use    Smoking status: Former     Packs/day: 0.50     Years: 15.00     Additional pack years: 0.00     Total pack years: 7.50     Types: Cigarettes     Quit date:      Years since quittin.9    Smokeless tobacco: Never   Substance Use Topics    Alcohol use: Not Currently        No pertinent FHx    Allergies   Allergen Reactions    Penicillins Anaphylaxis, Hives, Shortness of breath and Swelling     swollen tongue    Ace Inhibitors Other     ESRD    Arb-Angiotensin Receptor Antagonist Other     ESRD    Influenza Tri-Split 2007 Vac Other    Oxycodone-Acetaminophen Other     \"ZONED OUT\"    Penicillin Swelling    Ceftriaxone Itching and Rash     pt endorces itching to face and abd        Current Outpatient Medications   Medication Instructions    acetaminophen (Tylenol) 325 mg tablet     alfuzosin (Uroxatral) 10 mg 24 hr tablet 1 tablet, oral, Daily    amLODIPine (Norvasc) 2.5 mg tablet TAKE ONE (1) TABLET BY MOUTH ONCE DAILY.    artificial tears, dextran-hypomel-glycerin, 0.1-0.3-0.2 % ophthalmic solution 2 drops, Both Eyes, Every 4 hours PRN    atorvastatin (LIPITOR) 20 mg, oral, Daily    blood sugar diagnostic (Accu-Chek Tori Plus test strp) strip Use as instructed TWICE DAILY FOR TESTING FOR nON INSULIN DEPENDENT DIABETES E11.319<BR>    cholecalciferol (VITAMIN D-3) 50,000 Units, oral, Weekly    insulin degludec (TRESIBA FLEXTOUCH) 30 Units, subcutaneous, Every morning, And 10 units in the PM     insulin " lispro (HUMALOG) 10 Units, subcutaneous, Nightly    metoprolol succinate XL (TOPROL-XL) 50 mg, oral, Daily    mycophenolate (Cellcept) 250 mg capsule TAKE TWO (2) CAPSULES BY MOUTH TWICE DAILY.    OneTouch UltraSoft Lancets misc     polyethylene glycol (Glycolax, Miralax) 17 gram/dose powder oral,  as directed    prednisoLONE acetate (Pred-Forte) 1 % ophthalmic suspension 1 drop, Left Eye, 4 times daily, use until your next eye exam    Stool Softener-Laxative 8.6-50 mg tablet     tacrolimus (Prograf) 0.5 mg capsule TAKE TWO (2) CAPSULES BY MOUTH EVERY 12 HOURS.    tacrolimus (PROGRAF) 1 mg, oral, 2 times daily    tadalafil (CIALIS) 20 mg, oral, Daily PRN    triamcinolone (Kenalog) 0.1 % cream 1 Application, Topical, to affected area 2-3 times a day<BR>    Xarelto 20 mg, oral, Daily with evening meal        Vitals:    11/27/23 1119   BP: 140/62   Pulse: 72    Home BP cuff today    Physical Exam  Neurological:      Mental Status: He is alert and oriented to person, place, and time.          Lab Results   Component Value Date    CR 1.02      HGB 11.5      K 4.0      MG      BNP No results found for requested labs within last 365 days.            LDL      TSH       Assessment/Plan   73 yo BM w/ h/o CAC, CM (resolved; likely stress induced), DVT/PE 1/23 (brief IVC filter), athero of Ao, HTN, DM, ESRD s/p renal transplant '15, BPH, ETOH/hep-C cirrhosis, TOB (quit), ETOH (quit) now s/p PE/DVT possibly related to COVID - also CM at the time that has resolves (c/w stress induced). No sig cardiac symptoms. Nuc 11/23 with no ischemia. Low cardiac risk for prostate surgery -> proceed as indicated. Okay to hold Xarelto x 2-3 days prior to surgery.  -continue Xarelto 20 qd  -continue Metoprolol Succinate 50 qd  -continue Amlodipine 2.5 every day (consider change to ARB due to DM)  -continue Atorva 20 qd  -f/u 6 months (earlier if needed)     Fran Jung MD

## 2023-11-28 ENCOUNTER — TELEPHONE (OUTPATIENT)
Dept: CARDIOLOGY | Facility: CLINIC | Age: 73
End: 2023-11-28
Payer: COMMERCIAL

## 2023-11-28 NOTE — TELEPHONE ENCOUNTER
Spoke with daughter and she spoke to HUGO Jung yesterday too.  He understands the test result and is sharing info with her dad.

## 2023-11-28 NOTE — TELEPHONE ENCOUNTER
----- Message from Fran Jung MD sent at 11/27/2023 10:41 AM EST -----  Notify pt echo showed heart strength back up to low normal

## 2023-11-29 ENCOUNTER — APPOINTMENT (OUTPATIENT)
Dept: PREADMISSION TESTING | Facility: HOSPITAL | Age: 73
End: 2023-11-29
Payer: COMMERCIAL

## 2023-11-30 ENCOUNTER — PHARMACY VISIT (OUTPATIENT)
Dept: PHARMACY | Facility: CLINIC | Age: 73
End: 2023-11-30
Payer: MEDICARE

## 2023-11-30 ENCOUNTER — SPECIALTY PHARMACY (OUTPATIENT)
Dept: PHARMACY | Facility: CLINIC | Age: 73
End: 2023-11-30

## 2023-11-30 PROCEDURE — RXMED WILLOW AMBULATORY MEDICATION CHARGE

## 2023-12-04 ENCOUNTER — PRE-ADMISSION TESTING (OUTPATIENT)
Dept: PREADMISSION TESTING | Facility: HOSPITAL | Age: 73
End: 2023-12-04
Payer: COMMERCIAL

## 2023-12-04 ENCOUNTER — HOSPITAL ENCOUNTER (OUTPATIENT)
Dept: CARDIOLOGY | Facility: HOSPITAL | Age: 73
Discharge: HOME | End: 2023-12-04
Payer: COMMERCIAL

## 2023-12-04 VITALS
DIASTOLIC BLOOD PRESSURE: 66 MMHG | SYSTOLIC BLOOD PRESSURE: 123 MMHG | HEART RATE: 72 BPM | TEMPERATURE: 97.4 F | WEIGHT: 204 LBS | BODY MASS INDEX: 28.56 KG/M2 | HEIGHT: 71 IN

## 2023-12-04 DIAGNOSIS — N32.0 BLADDER NECK CONTRACTURE: ICD-10-CM

## 2023-12-04 DIAGNOSIS — N40.1 BENIGN PROSTATIC HYPERPLASIA WITH URINARY OBSTRUCTION: ICD-10-CM

## 2023-12-04 DIAGNOSIS — N30.90 RECURRENT CYSTITIS: ICD-10-CM

## 2023-12-04 DIAGNOSIS — N39.41 URGE INCONTINENCE OF URINE: ICD-10-CM

## 2023-12-04 DIAGNOSIS — D84.821 IMMUNODEFICIENCY DUE TO DRUGS (CODE) (MULTI): ICD-10-CM

## 2023-12-04 DIAGNOSIS — N13.8 BENIGN PROSTATIC HYPERPLASIA WITH URINARY OBSTRUCTION: ICD-10-CM

## 2023-12-04 DIAGNOSIS — K76.6 PORTAL HYPERTENSION (MULTI): ICD-10-CM

## 2023-12-04 LAB
ANION GAP SERPL CALC-SCNC: 12 MMOL/L (ref 10–20)
BUN SERPL-MCNC: 13 MG/DL (ref 6–23)
CALCIUM SERPL-MCNC: 9.2 MG/DL (ref 8.6–10.6)
CHLORIDE SERPL-SCNC: 105 MMOL/L (ref 98–107)
CO2 SERPL-SCNC: 25 MMOL/L (ref 21–32)
CREAT SERPL-MCNC: 0.95 MG/DL (ref 0.5–1.3)
ERYTHROCYTE [DISTWIDTH] IN BLOOD BY AUTOMATED COUNT: 17.9 % (ref 11.5–14.5)
GFR SERPL CREATININE-BSD FRML MDRD: 85 ML/MIN/1.73M*2
GLUCOSE SERPL-MCNC: 223 MG/DL (ref 74–99)
HCT VFR BLD AUTO: 32.4 % (ref 41–52)
HGB BLD-MCNC: 11.1 G/DL (ref 13.5–17.5)
MCH RBC QN AUTO: 26.1 PG (ref 26–34)
MCHC RBC AUTO-ENTMCNC: 34.3 G/DL (ref 32–36)
MCV RBC AUTO: 76 FL (ref 80–100)
NRBC BLD-RTO: 0 /100 WBCS (ref 0–0)
PLATELET # BLD AUTO: 299 X10*3/UL (ref 150–450)
POTASSIUM SERPL-SCNC: 3.9 MMOL/L (ref 3.5–5.3)
RBC # BLD AUTO: 4.25 X10*6/UL (ref 4.5–5.9)
SODIUM SERPL-SCNC: 138 MMOL/L (ref 136–145)
WBC # BLD AUTO: 5.4 X10*3/UL (ref 4.4–11.3)

## 2023-12-04 PROCEDURE — 93005 ELECTROCARDIOGRAM TRACING: CPT

## 2023-12-04 PROCEDURE — 93010 ELECTROCARDIOGRAM REPORT: CPT | Performed by: INTERNAL MEDICINE

## 2023-12-04 PROCEDURE — 80048 BASIC METABOLIC PNL TOTAL CA: CPT

## 2023-12-04 PROCEDURE — 99205 OFFICE O/P NEW HI 60 MIN: CPT | Performed by: NURSE PRACTITIONER

## 2023-12-04 PROCEDURE — 36415 COLL VENOUS BLD VENIPUNCTURE: CPT

## 2023-12-04 PROCEDURE — 85027 COMPLETE CBC AUTOMATED: CPT

## 2023-12-04 ASSESSMENT — CHADS2 SCORE
HYPERTENSION: YES
CHF: YES
AGE GREATER THAN OR EQUAL TO 75: NO
PRIOR STROKE OR TIA OR THROMBOEMBOLISM: NO

## 2023-12-04 ASSESSMENT — DUKE ACTIVITY SCORE INDEX (DASI)
TOTAL_SCORE: 12.5
CAN YOU WALK INDOORS, SUCH AS AROUND YOUR HOUSE: YES
CAN YOU TAKE CARE OF YOURSELF (EAT, DRESS, BATHE, OR USE TOILET): NO
CAN YOU PARTICIPATE IN STRENOUS SPORTS LIKE SWIMMING, SINGLES TENNIS, FOOTBALL, BASKETBALL, OR SKIING: NO
CAN YOU DO HEAVY WORK AROUND THE HOUSE LIKE SCRUBBING FLOORS OR LIFTING AND MOVING HEAVY FURNITURE: NO
DASI METS SCORE: 4.3
CAN YOU RUN A SHORT DISTANCE: NO
CAN YOU PARTICIPATE IN MODERATE RECREATIONAL ACTIVITIES LIKE GOLF, BOWLING, DANCING, DOUBLES TENNIS OR THROWING A BASEBALL OR FOOTBALL: NO
CAN YOU HAVE SEXUAL RELATIONS: YES
CAN YOU CLIMB A FLIGHT OF STAIRS OR WALK UP A HILL: YES
CAN YOU WALK A BLOCK OR TWO ON LEVEL GROUND: NO
CAN YOU DO LIGHT WORK AROUND THE HOUSE LIKE DUSTING OR WASHING DISHES: NO
CAN YOU DO YARD WORK LIKE RAKING LEAVES, WEEDING OR PUSHING A MOWER: NO
CAN YOU DO MODERATE WORK AROUND THE HOUSE LIKE VACUUMING, SWEEPING FLOORS OR CARRYING GROCERIES: NO

## 2023-12-04 ASSESSMENT — ENCOUNTER SYMPTOMS
GASTROINTESTINAL NEGATIVE: 1
DIFFICULTY URINATING: 1
NECK NEGATIVE: 1
WEAKNESS: 1
CARDIOVASCULAR NEGATIVE: 1
ARTHRALGIAS: 1
CONSTITUTIONAL NEGATIVE: 1
RESPIRATORY NEGATIVE: 1
ENDOCRINE NEGATIVE: 1

## 2023-12-04 ASSESSMENT — LIFESTYLE VARIABLES: SMOKING_STATUS: NONSMOKER

## 2023-12-04 NOTE — CPM/PAT H&P
CPM/PAT Evaluation       Name: Albert Schwartz (Albert Schwartz)  /Age: 1950/72 y.o.     Visit Type:   In-Person       Chief Complaint: Benign prostatic hyperplasia with urinary obstruction, Bladder neck contracture    HPI   Pt is a 72 year old male with a PMHx significant for CAC, CM (resolved; likely stress induced), DVT/PE s/p IVC filter, athero of Ao, HTN, DM, ESRD s/p renal transplant in , BPH, ETOH/hep-C cirrhosis, benign prostatic hyperplasia with urinary obstruction and Bladder neck contracture. Pt is being evaluated in CPM in anticipation of Incision Transurethral Bladder Neck, urethral dilation, and pinedo catheter placement with Dr. Gonzales on 12-15-23  Past Medical History:   Diagnosis Date    Alcohol abuse, in remission     Arthritis     Bladder neck contracture     Scheduled for surgery with Dr. Gonzales on 12/15/23    Blindness     BPH (benign prostatic hyperplasia)     Chronic diastolic (congestive) heart failure (CMS/HCC) 2018    Chronic diastolic congestive heart failure    Chronic hepatitis C (CMS/HCC)     Cirrhosis (CMS/HCC)     2/2 ETOH use    Diabetes mellitus (CMS/HCC)     Disorder of male genital organs, unspecified 2018    Testicular abnormality    ESRD (end stage renal disease) (CMS/HCC)     S/P renal transplant in , F/W Nephrology Dr. Liu, LOV 10/16/2023    GERD (gastroesophageal reflux disease)     Hyperlipidemia     Hypertension     Immunodeficiency, unspecified (CMS/HCC) 2018    Immunosuppression    Kidney transplant status     Other ascites     Ascites    Other specified personal risk factors, not elsewhere classified 2018    At risk for infection transmitted from donor    PE (pulmonary thromboembolism) (CMS/HCC)     on Eliquis, F/W cards    Peripheral vascular disease, unspecified (CMS/HCC) 2018    PAD (peripheral artery disease)       Past Surgical History:   Procedure Laterality Date    CATARACT EXTRACTION  2018    Cataract  Extraction    CYSTOSCOPY      ESOPHAGOGASTRODUODENOSCOPY      FL GUIDED ABDOMINAL PARACENTESIS  03/30/2015    FL GUIDED ABDOMINAL PARACENTESIS 3/30/2015 [facility] AIB LEGACY    FL GUIDED ABDOMINAL PARACENTESIS  04/14/2015    FL GUIDED ABDOMINAL PARACENTESIS 4/14/2015 [facility] INPATIENT LEGACY    FL GUIDED ABDOMINAL PARACENTESIS  08/28/2015    FL GUIDED ABDOMINAL PARACENTESIS 8/28/2015 [facility] AIB LEGACY    KIDNEY SURGERY  06/02/2020    Kidney Surgery    OTHER SURGICAL HISTORY  08/18/2014    Brain Surgery    OTHER SURGICAL HISTORY  06/30/2020    Renal Transplant    OTHER SURGICAL HISTORY  06/02/2020    Incisional Hernia Repair    SPLENECTOMY, TOTAL  06/02/2020    Splenectomy    US GUIDED ABDOMINAL PARACENTESIS  03/20/2014    US GUIDED ABDOMINAL PARACENTESIS 3/20/2014 [facility] AIB LEGACY    US GUIDED ABDOMINAL PARACENTESIS  08/14/2014    US GUIDED ABDOMINAL PARACENTESIS 8/14/2014 [facility] AIB LEGACY    US GUIDED ABDOMINAL PARACENTESIS  11/17/2014    US GUIDED ABDOMINAL PARACENTESIS 11/17/2014 [facility] AIB LEGACY    US GUIDED ABDOMINAL PARACENTESIS  12/11/2014    US GUIDED ABDOMINAL PARACENTESIS 12/11/2014 [facility] CLINICAL LEGACY    US GUIDED ABDOMINAL PARACENTESIS  01/27/2015    US GUIDED ABDOMINAL PARACENTESIS 1/27/2015 [facility] ANCILLARY LEGACY    US GUIDED ABDOMINAL PARACENTESIS  02/19/2015    US GUIDED ABDOMINAL PARACENTESIS 2/19/2015 [facility] AIB LEGACY    US GUIDED ABDOMINAL PARACENTESIS  03/18/2015    US GUIDED ABDOMINAL PARACENTESIS 3/18/2015 [facility] AIB LEGACY    US GUIDED ABDOMINAL PARACENTESIS  04/17/2015    US GUIDED ABDOMINAL PARACENTESIS 4/17/2015 [facility] INPATIENT LEGACY    US GUIDED ABDOMINAL PARACENTESIS  07/14/2015    US GUIDED ABDOMINAL PARACENTESIS 7/14/2015 [facility] AIB LEGACY    US GUIDED ABDOMINAL PARACENTESIS  10/12/2015    US GUIDED ABDOMINAL PARACENTESIS 10/12/2015 [facility] CLINICAL LEGACY    US GUIDED ABDOMINAL PARACENTESIS  10/19/2015    US GUIDED ABDOMINAL PARACENTESIS 10/19/2015 [facility] CLINICAL LEGACY       Patient Sexual activity questions deferred  "to the physician.    Family History   Problem Relation Name Age of Onset    Colon cancer Sister      Heart disease Brother         Allergies   Allergen Reactions    Penicillins Anaphylaxis, Hives, Shortness of breath and Swelling     swollen tongue    Ace Inhibitors Other     ESRD    Arb-Angiotensin Receptor Antagonist Other     ESRD    Influenza Tri-Split 2007 Vac Other    Oxycodone-Acetaminophen Other     \"ZONED OUT\"    Penicillin Swelling    Ceftriaxone Itching and Rash     pt endorces itching to face and abd       Prior to Admission medications    Medication Sig Start Date End Date Taking? Authorizing Provider   amLODIPine (Norvasc) 2.5 mg tablet TAKE ONE (1) TABLET BY MOUTH ONCE DAILY. 4/21/23 4/20/24 Yes Akosua Courtney MD   artificial tears, dextran-hypomel-glycerin, 0.1-0.3-0.2 % ophthalmic solution Administer 2 drops into both eyes every 4 hours if needed for dry eyes. 2/1/23  Yes Historical Provider, MD   atorvastatin (Lipitor) 20 mg tablet Take 1 tablet (20 mg) by mouth once daily.   Yes Historical Provider, MD   cholecalciferol (Vitamin D-3) 1,250 mcg (50,000 unit) capsule Take 1 capsule (50,000 Units) by mouth once a week. 7/12/22  Yes Historical Provider, MD   insulin degludec (Tresiba FlexTouch) 100 unit/mL (3 mL) injection Inject 30 Units under the skin once daily in the morning. And 10 units in the PM 2/14/22  Yes Historical Provider, MD   insulin lispro (HumaLOG) 100 unit/mL injection Inject 0.1 mL (10 Units) under the skin once daily at bedtime.   Yes Historical Provider, MD   metoprolol succinate XL (Toprol-XL) 50 mg 24 hr tablet Take 1 tablet (50 mg) by mouth once daily. 2/1/23  Yes Historical Provider, MD   mycophenolate (Cellcept) 250 mg capsule TAKE TWO (2) CAPSULES BY MOUTH TWICE DAILY. 4/17/23 4/16/24 Yes Akosua Courtney MD   polyethylene glycol (Glycolax, Miralax) 17 gram/dose powder Take by mouth.  as directed   Yes Historical Provider, MD   prednisoLONE acetate (Pred-Forte) 1 % " ophthalmic suspension Administer 1 drop into the left eye 4 times a day. use until your next eye exam 3/8/23  Yes Historical Provider, MD   Stool Softener-Laxative 8.6-50 mg tablet  11/17/22  Yes Historical Provider, MD   tacrolimus (Prograf) 0.5 mg capsule TAKE TWO (2) CAPSULES BY MOUTH EVERY 12 HOURS. 8/1/23 7/31/24 Yes Ramos Liu MD   tacrolimus (Prograf) 1 mg capsule Take 1 capsule (1 mg) by mouth twice a day. 7/17/23  Yes Historical Provider, MD   triamcinolone (Kenalog) 0.1 % cream Apply 1 Application topically. to affected area 2-3 times a day 3/23/21  Yes Historical Provider, MD   Xarelto 20 mg tablet Take 1 tablet (20 mg) by mouth once daily in the evening. Take with meals. 6/27/23  Yes Historical Provider, MD   acetaminophen (Tylenol) 325 mg tablet  11/18/22   Historical Provider, MD   blood sugar diagnostic (Accu-Chek Tori Plus test strp) strip Use as instructed TWICE DAILY FOR TESTING FOR nON INSULIN DEPENDENT DIABETES E11.319 5/24/23   Historical Provider, MD   OneTouch UltraSoft Lancets misc  11/18/22   Historical Provider, MD   tadalafil 20 mg tablet Take 1 tablet (20 mg) by mouth once daily as needed for erectile dysfunction (1 HOUR BEFORE NEEDED). 6/15/21   Historical Provider, MD   alfuzosin (Uroxatral) 10 mg 24 hr tablet Take 1 tablet (10 mg) by mouth once daily. 12/11/18 12/4/23  Historical Provider, MD HUFFMAN ROS:   Constitutional:   neg    Neuro/Psych:    weakness  Eyes:    Legally blind  Ears:   neg    Nose:   neg    Mouth:   neg    Throat:   neg    Neck:   neg    Cardio:   neg    Respiratory:   neg    Endocrine:   neg    GI:   neg    :    difficulty urinating  Musculoskeletal:    arthralgias  Hematologic:   neg    Skin:  neg        Physical Exam  HENT:      Head: Normocephalic.      Nose: Nose normal.   Eyes:      Comments: Legally blind   Cardiovascular:      Rate and Rhythm: Normal rate and regular rhythm.   Pulmonary:      Effort: Pulmonary effort is normal.      Breath  sounds: Normal breath sounds.   Abdominal:      Palpations: Abdomen is soft.   Musculoskeletal:      Cervical back: Normal range of motion.   Skin:     General: Skin is warm.   Neurological:      Mental Status: He is alert and oriented to person, place, and time.      Motor: Weakness present.      Comments: Ambulates short distance with walker   Psychiatric:         Mood and Affect: Mood normal.         Behavior: Behavior normal.          PAT AIRWAY:   Airway:     Mallampati::  II    TM distance::  >3 FB    Neck ROM::  Full   upper dentures      Visit Vitals  /66   Pulse 72   Temp 36.3 °C (97.4 °F)       DASI Risk Score      Flowsheet Row Most Recent Value   DASI SCORE 12.5   METS Score (Will be calculated only when all the questions are answered) 4.3          Caprini DVT Assessment      Flowsheet Row Most Recent Value   DVT Score 10   Current Status Major surgery planned, lasting 2-3 hours   History Prior major surgery, SVT, DVT/PE   Age 60-75 years   BMI 30 or less          Modified Frailty Index      Flowsheet Row Most Recent Value   Modified Frailty Index Calculator .[id number]          CHADS2 Stroke Risk  Current as of 51 minutes ago        N/A 3 - 100%: High Risk   2 - 3%: Medium Risk   0 - 2%: Low Risk     Last Change: N/A          This score determines the patient's risk of having a stroke if the patient has atrial fibrillation.        This score is not applicable to this patient. Components are not calculated.          Revised Cardiac Risk Index      Flowsheet Row Most Recent Value   Revised Cardiac Risk Calculator 1          Apfel Simplified Score      Flowsheet Row Most Recent Value   Apfel Simplified Score Calculator 2          Risk Analysis Index Results This Encounter    No data found in the last 1 encounters.       Stop Bang Score      Flowsheet Row Most Recent Value   Do you snore loudly? 0   Do you often feel tired or fatigued after your sleep? 0   Has anyone ever observed you stop breathing in your  sleep? 0   Do you have or are you being treated for high blood pressure? 1   Recent BMI (Calculated) 28.5   Is BMI greater than 35 kg/m2? 0=No   Age older than 50 years old? 1=Yes   Gender - Male 1=Yes            Assessment and Plan:     Neuro:   The patient is at increased risk for perioperative stroke secondary to hypertension , perioperative interruption of antithrombotic, increased age, diabetes mellitus, general anesthesia, operative time >2.5 hours.    HEENT/Airway  No diagnoses, significant findings on chart review, clinical presentation, or evaluation.    Cardiovascular  The patient is scheduled for non-cardiac surgery associated with elevated risk.  The patient has no major cardiac contraindications to non- cardiac surgery.  RCRI  The patient meets 3 or more RCRI criteria and therefore is at high risk for major adverse cardiac complications.  METS  The patient's functional capacity capacity is greater than 4 METS.  EKG  The patient has no EKG or echocardiographic changes concerning for myocardial ischemia.  No further cardiac evaluation is indicated  Heart Failure  The patient has no known history of heart failure.  Additionally, the patient reports no symptoms of heart failure and demonstrates no signs of heart failure.  Hypertension Evaluation  The patient has a known history of hypertension that is controlled.  Patient's hypertension is most consistent with stage 1.  Heart Rhythm Evaluation  The patient has no history of arrhythmias.  Heart Valve Evaluation  The patient has no known history of valvular heart disease. The patient has no symptoms or physical exam findings to suggest valvular heart disease.  CARDS EVAL  The patient follows with cardiology, Dr. Jung. Patient was last seen 11-27-23. Per note, continue medications follow up in 6 months or sooner if needed.  The patient has a 30-day risk for MACE of 3 or greater predictors, 15% risk for cardiac death, nonfatal myocardial infarction, and  nonfatal cardiac arrest.  ILIA score which indicates a 0.5% risk of intraoperative or 30-day postoperative.    Pulmonary   The patient is at increased risk of perioperative pulmonary complications secondary to advanced age greater than 60, functional dependency.  The patient has a stop bang score of 3, which places patient at intermediate risk for having SEBASTIAN.  ARISCAT 26, Intermediate, 13.3% risk of in-hospital postoperative pulmonary complications  PRODIGY 20, high of respiratory depression episode.    Hematology  No diagnoses or significant findings on chart review or clinical presentation and evaluation.  Antiplatelet management   The patient is not currently receiving antiplatelet therapy.  Anticoagulation management  The patient is currently receiving anticoagulation therapy for history of DVT/PE.  Caprini score 10, high risk of perioperative VTE  Transfusion Evaluation  A type and screen was obtained given the likelihood for perioperative transfusion of blood or blood products.    GI  No diagnoses or significant findings on chart review or clinical presentation and evaluation.  Eat 10- 0,  self-perceived oropharyngeal dysphagia scale (0-40)     Genitourinary  The patient has diagnoses or significant findings on chart review or clinical presentation and evaluation significant for BPH with Urinary obstruction and bladder neck contracture    Renal  Pt is s/p kidney transplant  The patient has specific risk factors associated with increased risk of perioperative renal complications due to age greater than 55, male gender, hypertension, diabetes mellitus.     Musculoskeletal  The patient has diagnoses or significant findings on chart review or clinical presentation and evaluation significant for Osteoarthritis    Endocrine  Diabetes Evaluation  The patient has history of diabetes mellitus controlled by medication  Thyroid Disease Evaluation  The patient has no history of thyroid disease.    ID  No diagnoses or  significant findings on chart review or clinical presentation and evaluation.    -Preoperative medication instructions were provided and reviewed with the patient.  Any additional testing or evaluation was explained to the patient.  NPO Instructions were discussed, and the patient's questions were answered prior to conclusion of this encounter

## 2023-12-04 NOTE — PREPROCEDURE INSTRUCTIONS
NPO Instructions:    Do not eat any food after midnight the night before your surgery/procedure.  You may have clear liquids until TWO hours before surgery/procedure. This includes water, black tea/coffee, (no milk or cream) apple juice and electrolyte drinks (Gatorade).  You may chew gum up to TWO hours before your surgery/procedure.    Additional Instructions:     Seven/Six Days before Surgery:  Review your medication instructions, stop indicated medications  Five Days before Surgery:  Review your medication instructions, stop indicated medications  Three Days before Surgery:  Review your medication instructions, stop indicated medications  The Day before Surgery:  Review your medication instructions, stop indicated medications  You will be contacted regarding the time of your arrival to facility and surgery time  Do not eat any food after Midnight  Day of Surgery:  Review your medication instructions, take indicated medications  If you have diabetes, please check your fasting blood sugar upon awakening.  If fasting blood sugar is <80 mg/dl, drink 100 ml of apple juice, time limit of 2 hours before  You may have clear liquids until TWO hours before surgery/procedure.  This includes water, black tea/coffee, (no milk or cream) apple juice and electrolyte drinks (Gatorade)  You may chew gum up to TWO hours before your surgery/procedure  Wear  comfortable loose fitting clothing  Do not use moisturizers, creams, lotions or perfume

## 2023-12-05 LAB
ATRIAL RATE: 69 BPM
P AXIS: 29 DEGREES
P OFFSET: 167 MS
P ONSET: 99 MS
PR INTERVAL: 232 MS
Q ONSET: 215 MS
QRS COUNT: 11 BEATS
QRS DURATION: 108 MS
QT INTERVAL: 434 MS
QTC CALCULATION(BAZETT): 465 MS
QTC FREDERICIA: 454 MS
R AXIS: 60 DEGREES
T AXIS: -60 DEGREES
T OFFSET: 432 MS
VENTRICULAR RATE: 69 BPM

## 2023-12-07 ENCOUNTER — HOSPITAL ENCOUNTER (EMERGENCY)
Facility: HOSPITAL | Age: 73
Discharge: LEFT BEFORE DISCHARGE | End: 2023-12-07
Payer: COMMERCIAL

## 2023-12-07 VITALS
OXYGEN SATURATION: 95 % | RESPIRATION RATE: 16 BRPM | HEIGHT: 71 IN | HEART RATE: 96 BPM | BODY MASS INDEX: 28.56 KG/M2 | WEIGHT: 204 LBS | TEMPERATURE: 97.5 F | DIASTOLIC BLOOD PRESSURE: 87 MMHG | SYSTOLIC BLOOD PRESSURE: 142 MMHG

## 2023-12-07 PROCEDURE — 4500999001 HC ED NO CHARGE

## 2023-12-07 PROCEDURE — 99283 EMERGENCY DEPT VISIT LOW MDM: CPT

## 2023-12-07 ASSESSMENT — COLUMBIA-SUICIDE SEVERITY RATING SCALE - C-SSRS
2. HAVE YOU ACTUALLY HAD ANY THOUGHTS OF KILLING YOURSELF?: NO
6. HAVE YOU EVER DONE ANYTHING, STARTED TO DO ANYTHING, OR PREPARED TO DO ANYTHING TO END YOUR LIFE?: NO
1. IN THE PAST MONTH, HAVE YOU WISHED YOU WERE DEAD OR WISHED YOU COULD GO TO SLEEP AND NOT WAKE UP?: NO

## 2023-12-07 ASSESSMENT — PAIN SCALES - GENERAL: PAINLEVEL_OUTOF10: 10 - WORST POSSIBLE PAIN

## 2023-12-07 ASSESSMENT — PAIN - FUNCTIONAL ASSESSMENT: PAIN_FUNCTIONAL_ASSESSMENT: 0-10

## 2023-12-08 NOTE — ED TRIAGE NOTES
Patient presents to the ED for difficulty urinating. Since March, had surgery on prostate, has had difficulty urinating. Patient is endorsing pain in abdomen. Patient is also endorsing sciatica.

## 2023-12-18 ENCOUNTER — APPOINTMENT (OUTPATIENT)
Dept: UROLOGY | Facility: HOSPITAL | Age: 73
End: 2023-12-18
Payer: COMMERCIAL

## 2024-01-10 ENCOUNTER — ANESTHESIA EVENT (OUTPATIENT)
Dept: OPERATING ROOM | Facility: HOSPITAL | Age: 74
End: 2024-01-10
Payer: COMMERCIAL

## 2024-01-11 ENCOUNTER — HOSPITAL ENCOUNTER (OUTPATIENT)
Facility: HOSPITAL | Age: 74
Setting detail: OUTPATIENT SURGERY
Discharge: HOME | End: 2024-01-11
Attending: STUDENT IN AN ORGANIZED HEALTH CARE EDUCATION/TRAINING PROGRAM | Admitting: STUDENT IN AN ORGANIZED HEALTH CARE EDUCATION/TRAINING PROGRAM
Payer: COMMERCIAL

## 2024-01-11 ENCOUNTER — ANESTHESIA (OUTPATIENT)
Dept: OPERATING ROOM | Facility: HOSPITAL | Age: 74
End: 2024-01-11
Payer: COMMERCIAL

## 2024-01-11 VITALS
HEIGHT: 71 IN | BODY MASS INDEX: 27.58 KG/M2 | HEART RATE: 62 BPM | DIASTOLIC BLOOD PRESSURE: 76 MMHG | SYSTOLIC BLOOD PRESSURE: 157 MMHG | OXYGEN SATURATION: 95 % | TEMPERATURE: 97.2 F | RESPIRATION RATE: 11 BRPM | WEIGHT: 197 LBS

## 2024-01-11 DIAGNOSIS — N32.0 BLADDER NECK CONTRACTURE: Primary | ICD-10-CM

## 2024-01-11 LAB
GLUCOSE BLD MANUAL STRIP-MCNC: 108 MG/DL (ref 74–99)
GLUCOSE BLD MANUAL STRIP-MCNC: 87 MG/DL (ref 74–99)

## 2024-01-11 PROCEDURE — 7100000001 HC RECOVERY ROOM TIME - INITIAL BASE CHARGE: Performed by: STUDENT IN AN ORGANIZED HEALTH CARE EDUCATION/TRAINING PROGRAM

## 2024-01-11 PROCEDURE — 2500000001 HC RX 250 WO HCPCS SELF ADMINISTERED DRUGS (ALT 637 FOR MEDICARE OP): Performed by: STUDENT IN AN ORGANIZED HEALTH CARE EDUCATION/TRAINING PROGRAM

## 2024-01-11 PROCEDURE — 3700000001 HC GENERAL ANESTHESIA TIME - INITIAL BASE CHARGE: Performed by: STUDENT IN AN ORGANIZED HEALTH CARE EDUCATION/TRAINING PROGRAM

## 2024-01-11 PROCEDURE — 3600000003 HC OR TIME - INITIAL BASE CHARGE - PROCEDURE LEVEL THREE: Performed by: STUDENT IN AN ORGANIZED HEALTH CARE EDUCATION/TRAINING PROGRAM

## 2024-01-11 PROCEDURE — 2500000004 HC RX 250 GENERAL PHARMACY W/ HCPCS (ALT 636 FOR OP/ED)

## 2024-01-11 PROCEDURE — 82947 ASSAY GLUCOSE BLOOD QUANT: CPT

## 2024-01-11 PROCEDURE — 52318 REMOVE BLADDER STONE: CPT | Performed by: STUDENT IN AN ORGANIZED HEALTH CARE EDUCATION/TRAINING PROGRAM

## 2024-01-11 PROCEDURE — A4217 STERILE WATER/SALINE, 500 ML: HCPCS | Performed by: STUDENT IN AN ORGANIZED HEALTH CARE EDUCATION/TRAINING PROGRAM

## 2024-01-11 PROCEDURE — 99100 ANES PT EXTEME AGE<1 YR&>70: CPT | Performed by: ANESTHESIOLOGY

## 2024-01-11 PROCEDURE — 7100000002 HC RECOVERY ROOM TIME - EACH INCREMENTAL 1 MINUTE: Performed by: STUDENT IN AN ORGANIZED HEALTH CARE EDUCATION/TRAINING PROGRAM

## 2024-01-11 PROCEDURE — A52500 PR TRANSURETHRAL RESEC BLADDER NECK: Performed by: ANESTHESIOLOGY

## 2024-01-11 PROCEDURE — 2720000007 HC OR 272 NO HCPCS: Performed by: STUDENT IN AN ORGANIZED HEALTH CARE EDUCATION/TRAINING PROGRAM

## 2024-01-11 PROCEDURE — 2500000005 HC RX 250 GENERAL PHARMACY W/O HCPCS

## 2024-01-11 PROCEDURE — 7100000010 HC PHASE TWO TIME - EACH INCREMENTAL 1 MINUTE: Performed by: STUDENT IN AN ORGANIZED HEALTH CARE EDUCATION/TRAINING PROGRAM

## 2024-01-11 PROCEDURE — 2500000004 HC RX 250 GENERAL PHARMACY W/ HCPCS (ALT 636 FOR OP/ED): Performed by: STUDENT IN AN ORGANIZED HEALTH CARE EDUCATION/TRAINING PROGRAM

## 2024-01-11 PROCEDURE — C1769 GUIDE WIRE: HCPCS | Performed by: STUDENT IN AN ORGANIZED HEALTH CARE EDUCATION/TRAINING PROGRAM

## 2024-01-11 PROCEDURE — 7100000009 HC PHASE TWO TIME - INITIAL BASE CHARGE: Performed by: STUDENT IN AN ORGANIZED HEALTH CARE EDUCATION/TRAINING PROGRAM

## 2024-01-11 PROCEDURE — 3700000002 HC GENERAL ANESTHESIA TIME - EACH INCREMENTAL 1 MINUTE: Performed by: STUDENT IN AN ORGANIZED HEALTH CARE EDUCATION/TRAINING PROGRAM

## 2024-01-11 PROCEDURE — 3600000008 HC OR TIME - EACH INCREMENTAL 1 MINUTE - PROCEDURE LEVEL THREE: Performed by: STUDENT IN AN ORGANIZED HEALTH CARE EDUCATION/TRAINING PROGRAM

## 2024-01-11 PROCEDURE — 52500 TRURL RESECTION BLADDER NECK: CPT | Performed by: STUDENT IN AN ORGANIZED HEALTH CARE EDUCATION/TRAINING PROGRAM

## 2024-01-11 RX ORDER — DOCUSATE SODIUM 100 MG/1
100 CAPSULE, LIQUID FILLED ORAL 2 TIMES DAILY PRN
Qty: 14 CAPSULE | Refills: 0 | Status: SHIPPED | OUTPATIENT
Start: 2024-01-11 | End: 2024-01-25

## 2024-01-11 RX ORDER — CIPROFLOXACIN 2 MG/ML
INJECTION, SOLUTION INTRAVENOUS CONTINUOUS PRN
Status: DISCONTINUED | OUTPATIENT
Start: 2024-01-11 | End: 2024-01-11

## 2024-01-11 RX ORDER — SODIUM CHLORIDE 0.9 G/100ML
IRRIGANT IRRIGATION AS NEEDED
Status: DISCONTINUED | OUTPATIENT
Start: 2024-01-11 | End: 2024-01-11 | Stop reason: HOSPADM

## 2024-01-11 RX ORDER — LIDOCAINE IN NACL,ISO-OSMOT/PF 30 MG/3 ML
0.1 SYRINGE (ML) INJECTION ONCE
Status: DISCONTINUED | OUTPATIENT
Start: 2024-01-11 | End: 2024-01-11 | Stop reason: HOSPADM

## 2024-01-11 RX ORDER — PROPOFOL 10 MG/ML
INJECTION, EMULSION INTRAVENOUS AS NEEDED
Status: DISCONTINUED | OUTPATIENT
Start: 2024-01-11 | End: 2024-01-11

## 2024-01-11 RX ORDER — POLYETHYLENE GLYCOL 3350 17 G/17G
17 POWDER, FOR SOLUTION ORAL DAILY
Qty: 7 PACKET | Refills: 0 | OUTPATIENT
Start: 2024-01-11 | End: 2024-01-25

## 2024-01-11 RX ORDER — ONDANSETRON HYDROCHLORIDE 2 MG/ML
INJECTION, SOLUTION INTRAVENOUS AS NEEDED
Status: DISCONTINUED | OUTPATIENT
Start: 2024-01-11 | End: 2024-01-11

## 2024-01-11 RX ORDER — WATER 1 ML/ML
IRRIGANT IRRIGATION AS NEEDED
Status: DISCONTINUED | OUTPATIENT
Start: 2024-01-11 | End: 2024-01-11 | Stop reason: HOSPADM

## 2024-01-11 RX ORDER — HYDROMORPHONE HYDROCHLORIDE 1 MG/ML
0.2 INJECTION, SOLUTION INTRAMUSCULAR; INTRAVENOUS; SUBCUTANEOUS EVERY 5 MIN PRN
Status: DISCONTINUED | OUTPATIENT
Start: 2024-01-11 | End: 2024-01-11 | Stop reason: HOSPADM

## 2024-01-11 RX ORDER — LIDOCAINE HCL/PF 100 MG/5ML
SYRINGE (ML) INTRAVENOUS AS NEEDED
Status: DISCONTINUED | OUTPATIENT
Start: 2024-01-11 | End: 2024-01-11

## 2024-01-11 RX ORDER — FENTANYL CITRATE 50 UG/ML
INJECTION, SOLUTION INTRAMUSCULAR; INTRAVENOUS AS NEEDED
Status: DISCONTINUED | OUTPATIENT
Start: 2024-01-11 | End: 2024-01-11

## 2024-01-11 RX ORDER — SODIUM CHLORIDE, SODIUM LACTATE, POTASSIUM CHLORIDE, CALCIUM CHLORIDE 600; 310; 30; 20 MG/100ML; MG/100ML; MG/100ML; MG/100ML
100 INJECTION, SOLUTION INTRAVENOUS CONTINUOUS
Status: DISCONTINUED | OUTPATIENT
Start: 2024-01-11 | End: 2024-01-11 | Stop reason: HOSPADM

## 2024-01-11 RX ORDER — OXYCODONE HYDROCHLORIDE 5 MG/1
5 TABLET ORAL EVERY 6 HOURS PRN
Qty: 12 TABLET | Refills: 0 | Status: SHIPPED | OUTPATIENT
Start: 2024-01-11 | End: 2024-01-14

## 2024-01-11 RX ORDER — LIDOCAINE HYDROCHLORIDE 20 MG/ML
INJECTION, SOLUTION INFILTRATION; PERINEURAL AS NEEDED
Status: DISCONTINUED | OUTPATIENT
Start: 2024-01-11 | End: 2024-01-11

## 2024-01-11 RX ORDER — HYDROMORPHONE HYDROCHLORIDE 1 MG/ML
0.1 INJECTION, SOLUTION INTRAMUSCULAR; INTRAVENOUS; SUBCUTANEOUS EVERY 5 MIN PRN
Status: DISCONTINUED | OUTPATIENT
Start: 2024-01-11 | End: 2024-01-11 | Stop reason: HOSPADM

## 2024-01-11 RX ADMIN — CIPROFLOXACIN: 400 INJECTION, SOLUTION INTRAVENOUS at 07:48

## 2024-01-11 RX ADMIN — LIDOCAINE HYDROCHLORIDE 40 MG: 20 INJECTION INTRAVENOUS at 07:48

## 2024-01-11 RX ADMIN — SODIUM CHLORIDE, POTASSIUM CHLORIDE, SODIUM LACTATE AND CALCIUM CHLORIDE 100 ML/HR: 600; 310; 30; 20 INJECTION, SOLUTION INTRAVENOUS at 09:40

## 2024-01-11 RX ADMIN — PROPOFOL 20 MG: 10 INJECTION, EMULSION INTRAVENOUS at 08:02

## 2024-01-11 RX ADMIN — FENTANYL CITRATE 25 MCG: 50 INJECTION, SOLUTION INTRAMUSCULAR; INTRAVENOUS at 07:45

## 2024-01-11 RX ADMIN — LIDOCAINE HYDROCHLORIDE 60 MG: 20 INJECTION INTRAVENOUS at 07:47

## 2024-01-11 RX ADMIN — HYDROMORPHONE HYDROCHLORIDE 0.2 MG: 1 INJECTION, SOLUTION INTRAMUSCULAR; INTRAVENOUS; SUBCUTANEOUS at 10:00

## 2024-01-11 RX ADMIN — PROPOFOL 20 MG: 10 INJECTION, EMULSION INTRAVENOUS at 08:36

## 2024-01-11 RX ADMIN — HYDROMORPHONE HYDROCHLORIDE 0.2 MG: 1 INJECTION, SOLUTION INTRAMUSCULAR; INTRAVENOUS; SUBCUTANEOUS at 09:45

## 2024-01-11 RX ADMIN — HYDROMORPHONE HYDROCHLORIDE 0.2 MG: 1 INJECTION, SOLUTION INTRAMUSCULAR; INTRAVENOUS; SUBCUTANEOUS at 10:15

## 2024-01-11 RX ADMIN — Medication 2 L/MIN: at 09:45

## 2024-01-11 RX ADMIN — PROPOFOL 110 MG: 10 INJECTION, EMULSION INTRAVENOUS at 07:47

## 2024-01-11 RX ADMIN — ONDANSETRON 4 MG: 2 INJECTION INTRAMUSCULAR; INTRAVENOUS at 09:10

## 2024-01-11 ASSESSMENT — PAIN - FUNCTIONAL ASSESSMENT
PAIN_FUNCTIONAL_ASSESSMENT: CPOT (CRITICAL CARE PAIN OBSERVATION TOOL)
PAIN_FUNCTIONAL_ASSESSMENT: 0-10
PAIN_FUNCTIONAL_ASSESSMENT: CPOT (CRITICAL CARE PAIN OBSERVATION TOOL)
PAIN_FUNCTIONAL_ASSESSMENT: 0-10

## 2024-01-11 ASSESSMENT — PAIN SCALES - GENERAL
PAINLEVEL_OUTOF10: 0 - NO PAIN
PAINLEVEL_OUTOF10: 5 - MODERATE PAIN
PAINLEVEL_OUTOF10: 6
PAINLEVEL_OUTOF10: 0 - NO PAIN
PAINLEVEL_OUTOF10: 8
PAINLEVEL_OUTOF10: 0 - NO PAIN
PAINLEVEL_OUTOF10: 6
PAINLEVEL_OUTOF10: 0 - NO PAIN

## 2024-01-11 ASSESSMENT — ENCOUNTER SYMPTOMS
DYSURIA: 0
COUGH: 0
CHILLS: 0
FATIGUE: 0
APPETITE CHANGE: 0
ABDOMINAL DISTENTION: 0
ACTIVITY CHANGE: 0
CHEST TIGHTNESS: 0

## 2024-01-11 ASSESSMENT — PAIN DESCRIPTION - LOCATION
LOCATION: PENIS
LOCATION: PENIS

## 2024-01-11 NOTE — ANESTHESIA PROCEDURE NOTES
Airway  Date/Time: 1/11/2024 7:48 AM  Urgency: elective    Airway not difficult    Staffing  Performed: resident   Authorized by: Jessica Romero MD    Performed by: Erna Guadalupe MD  Patient location during procedure: OR    Indications and Patient Condition  Indications for airway management: anesthesia and airway protection  Spontaneous ventilation: present  Sedation level: deep  Preoxygenated: yes  Patient position: sniffing  MILS maintained throughout  Mask difficulty assessment: 1 - vent by mask    Final Airway Details  Final airway type: supraglottic airway      Successful airway: classic  Size 4     Number of attempts at approach: 2    Additional Comments  First inserted size 5 LMA, was too large (leak)

## 2024-01-11 NOTE — H&P
History Of Present Illness  Albert Schwartz is a 73 y.o. male with history of BPH s/p HoLEP 1.2023 now complicated by BNC. He presents today for cystoscopy and bladder neck incision.     Past Medical History  Past Medical History:   Diagnosis Date    Alcohol abuse, in remission     Arthritis     Bladder neck contracture     Scheduled for surgery with Dr. Gonzales on 12/15/23    Blindness     BPH (benign prostatic hyperplasia)     Chronic diastolic (congestive) heart failure (CMS/HCC) 01/18/2018    Chronic diastolic congestive heart failure    Chronic hepatitis C (CMS/HCC)     Cirrhosis (CMS/HCC)     2/2 ETOH use    Diabetes mellitus (CMS/HCC)     Disorder of male genital organs, unspecified 12/11/2018    Testicular abnormality    ESRD (end stage renal disease) (CMS/HCC)     S/P renal transplant in 2017, F/W Nephrology Dr. Liu, LOV 10/16/2023    GERD (gastroesophageal reflux disease)     Hyperlipidemia     Hypertension     Immunodeficiency, unspecified (CMS/HCC) 01/18/2018    Immunosuppression    Kidney transplant status     Other ascites     Ascites    Other specified personal risk factors, not elsewhere classified 01/18/2018    At risk for infection transmitted from donor    PE (pulmonary thromboembolism) (CMS/HCC)     on Eliquis, F/W cards    Peripheral vascular disease, unspecified (CMS/HCC) 01/18/2018    PAD (peripheral artery disease)       Surgical History  Past Surgical History:   Procedure Laterality Date    CATARACT EXTRACTION  01/18/2018    Cataract Extraction    CYSTOSCOPY      ESOPHAGOGASTRODUODENOSCOPY      FL GUIDED ABDOMINAL PARACENTESIS  03/30/2015    FL GUIDED ABDOMINAL PARACENTESIS [facility]    FL GUIDED ABDOMINAL PARACENTESIS  04/14/2015    FL GUIDED ABDOMINAL PARACENTESIS 4/14/2015 [facility] INPATIENT LEGACY    FL GUIDED ABDOMINAL PARACENTESIS  08/28/2015    FL GUIDED ABDOMINAL PARACENTESIS [facility]    KIDNEY SURGERY  06/02/2020    Kidney Surgery    OTHER SURGICAL  HISTORY  08/18/2014    Brain Surgery    OTHER SURGICAL HISTORY  06/30/2020    Renal Transplant    OTHER SURGICAL HISTORY  06/02/2020    Incisional Hernia Repair    SPLENECTOMY, TOTAL  06/02/2020    Splenectomy    US GUIDED ABDOMINAL PARACENTESIS  03/20/2014    US GUIDED ABDOMINAL PARACENTESIS 3[place]/2014 [facility] [place]    US GUIDED ABDOMINAL PARACENTESIS  08/14/2014    US GUIDED ABDOMINAL PARACENTESIS 8/14/2014 [facility]    US GUIDED ABDOMINAL PARACENTESIS  11/17/2014    US GUIDED ABDOMINAL PARACENTESIS 11/17/2014 [facility]    US GUIDED ABDOMINAL PARACENTESIS  12/11/2014    US GUIDED ABDOMINAL PARACENTESIS 12/11/2014 [facility] CLINICAL LEGACY    US GUIDED ABDOMINAL PARACENTESIS  01/27/2015    US GUIDED ABDOMINAL PARACENTESIS 1/27/2015 [facility]    US GUIDED ABDOMINAL PARACENTESIS  02/19/2015    US GUIDED ABDOMINAL PARACENTESIS 2/19/2015 [facility]    US GUIDED ABDOMINAL PARACENTESIS  03/18/2015    US GUIDED ABDOMINAL PARACENTESIS 3/18/2015 [facility]    US GUIDED ABDOMINAL PARACENTESIS  04/17/2015    US GUIDED ABDOMINAL PARACENTESIS 4/17/2015 [facility] [place]    US GUIDED ABDOMINAL PARACENTESIS  07/14/2015    US GUIDED ABDOMINAL PARACENTESIS 7/14/2015 [facility] [place]    US GUIDED ABDOMINAL PARACENTESIS  10/12/2015    US GUIDED ABDOMINAL PARACENTESIS 10/12/2015 [facility] CLINICAL LEGACY    US GUIDED ABDOMINAL PARACENTESIS  10/19/2015    US GUIDED ABDOMINAL PARACENTESIS 10/19/2015 [facility] CLINICAL LEGACY        Social History  He reports that he quit smoking about 24 years ago. His smoking use included cigarettes. He has a 7.50 pack-year smoking history. He has never used smokeless tobacco. He reports that he does not currently use alcohol. He reports that he does not use drugs.    Family History  Family History   Problem Relation Name Age of Onset    Colon cancer Sister      Heart disease Brother          Allergies  Penicillins, Ace inhibitors, Arb-angiotensin receptor antagonist, Influenza tri-split  "2007 vac, Oxycodone-acetaminophen, Penicillin, and Ceftriaxone    Review of Systems   Constitutional:  Negative for activity change, appetite change, chills and fatigue.   HENT:  Negative for congestion.    Respiratory:  Negative for cough and chest tightness.    Cardiovascular:  Negative for chest pain and leg swelling.   Gastrointestinal:  Negative for abdominal distention.   Genitourinary:  Negative for dysuria and testicular pain.        Physical Exam  Constitutional:       Appearance: Normal appearance.   HENT:      Head: Normocephalic and atraumatic.      Nose: Nose normal.      Mouth/Throat:      Mouth: Mucous membranes are moist.      Pharynx: Oropharynx is clear.   Eyes:      Extraocular Movements: Extraocular movements intact.      Pupils: Pupils are equal, round, and reactive to light.   Cardiovascular:      Rate and Rhythm: Normal rate.   Pulmonary:      Effort: Pulmonary effort is normal.      Breath sounds: Normal breath sounds.   Abdominal:      General: Abdomen is flat. Bowel sounds are normal.   Musculoskeletal:         General: Normal range of motion.   Skin:     General: Skin is warm and dry.   Neurological:      General: No focal deficit present.      Mental Status: He is alert.          Last Recorded Vitals  Blood pressure 153/76, pulse 66, temperature 36.3 °C (97.3 °F), temperature source Temporal, resp. rate 18, height 1.803 m (5' 11\"), weight 89.4 kg (197 lb).       Assessment/Plan   Active Problems:  There are no active Hospital Problems.      Albert Schwartz is a 73 y.o. male with history of BPH s/p HoLEP 1.2023 now complicated by BNC. He presents today for cystoscopy and bladder neck incision, possible urethral dilation.    -OR today  -NPO  -IVF  -Abx  -Discharge home with khris Rodríguez, DO    "

## 2024-01-11 NOTE — ANESTHESIA PROCEDURE NOTES
Peripheral IV  Date/Time: 1/11/2024 7:30 AM      Placement  Needle size: 20 G  Laterality: right  Location: hand  Local anesthetic: injectable  Site prep: alcohol  Technique: anatomical landmarks  Attempts: 4  Difficult Venous Access: Yes

## 2024-01-11 NOTE — ANESTHESIA POSTPROCEDURE EVALUATION
Patient: Albert Schwartz    Procedure Summary       Date: 01/11/24 Room / Location: [facility] OR 04 / Virtual [facility] OR    Anesthesia Start: 0731 Anesthesia Stop: 0940    Procedure: Incision Transurethral Bladder Neck, urethral dilation, pinedo catheter placement LITHOTOMY,AND cystolitholapaxy (Bladder) Diagnosis:       Benign prostatic hyperplasia with urinary obstruction      Bladder neck contracture      (Benign prostatic hyperplasia with urinary obstruction [N40.1, N13.8])      (Bladder neck contracture [N32.0])    Surgeons: Joaquin Gonzales MD Responsible Provider: Jessica Romero MD    Anesthesia Type: general ASA Status: 3            Anesthesia Type: general    Vitals Value Taken Time   /72 01/11/24 0931   Temp 36.0 01/11/24 0940   Pulse 60 01/11/24 0937   Resp 11 01/11/24 0937   SpO2 96 % 01/11/24 0937   Vitals shown include unvalidated device data.    Anesthesia Post Evaluation    Patient location during evaluation: PACU  Patient participation: complete - patient participated  Level of consciousness: awake and alert  Pain management: adequate  Airway patency: patent  Cardiovascular status: acceptable  Respiratory status: acceptable  Hydration status: acceptable  Postoperative Nausea and Vomiting: none        No notable events documented.

## 2024-01-11 NOTE — DISCHARGE INSTRUCTIONS
DEPARTMENT OF UROLOGY  DISCHARGE INSTRUCTIONS CYSTOSCOPY  Outpatient Surgery    C O N F I D E N T I A L   I N F O R M A T I O N    Albert Grayson      Call [phone] during regular daytime business hours (8:00 am - 5:00 pm) and after 5:00 pm ask for the Urology resident with any questions or concerns.      If it is a life-threatening situation, proceed to the nearest emergency department.        Follow-up appointment:  1/15 for pinedo removal     Thank you for the opportunity to care for you today.  Your health and healing are very important to us.  We hope we made you feel as comfortable as possible and are committed to your recovery and continued well-being.      The following is a brief overview of your cystoscopy procedure today. Some of the information contained on this summary may be confidential.  This information should be kept in your records and should be shared with your regular doctor.    Physicians:   Dr. Gonzales      Procedure performed: cystoscopy (looked inside your bladder)  Pending results:  none     What to Expect During your Recovery and Home Care  Anesthesia Side Effects   You received general anesthesia today.  You may feel sleepy, tired, or have a sore throat.   You may also feel drowsiness, dizziness, or inability to think clearly.  For your safety, do not drive, drink alcoholic beverages, take any unprescribed medication or make any important decisions for 24 hours.  A responsible adult should be with you for 24 hours.        Activity and Recovery    No heavy lifting today. Rest for the next 24 hours.     Medications:  Can resume Xarelto on 1/14 unless urine is bloody, then wait until after pinedo removal on 1/15.    Pain Control  Unfortunately, you may experience pain after your procedure.  Frequency and urgency to urinate and mild discomfort are expected. Adequate pain management can include alternative measures to help ease your pain and that can include over the counter Tylenol or  ibuprofen which can be taken as prescribed as needed for breakthrough pain. Do not take more than 4,000mg of Tylenol in a 24-hour period.        Nausea/Vomiting   Clear liquids are best tolerated at first. Start slow, advance your diet as tolerated to normal foods. Avoid spicy, greasy, heavy foods at first. Also, you may feel nauseous or like you need to vomit if you take any type of medication on an empty stomach.  Call your physician if you are unable to eat or drink and have persistent vomiting.    Signs of Bleeding   You are going to have some blood in your urine. Your urine will be light pink to yellow. If urine becomes thick dark felecia red, has large clots or you are unable to urinate, please notify your physician.    Treatment/wound care:   Keep area(s) clean and dry.   It is okay to shower 24 hours after time of surgery.      Signs of Infection  Signs of infection can include fever, drainage(green/yellow), chills, burning sensation with passing of urine, or severe abdominal pain.  If you see any of these occur, please contact your doctor's office at [phone].  Any fever higher than 100.4, especially if associated with an ill feeling, abdominal pain, chills, or nausea should be reported to your surgeon.        Assist in bowel movements/urination  Increase fiber in diet  Increase water (6 to 8 glasses)  Increase walking   Urination should occur within 6 hours of anesthesia  If you have tried these methods and your bladder still feels full and you cannot use the bathroom, please go to your nearest Emergency room/contact your physician.    Additional Instructions:   Increase water intake for the next 24 hours to help flush out our urinary system.    Additional Instructions: CATHETER CARE  Always keep the catheters tubing and drainage bag below the level of your bladder.  Avoid loops and kinks in the catheter tubing.  NOTIFY your physician if catheter falls out or catheter seems clogged and urine is not  draining.   Do not wear the small leg bag to bed you should be provided with a larger overnight bag that you should wear to bed and can hang over the side of the bed.  We recommend wearing the large bag in the shower, as this is easy to dry, and you do not get your leg straps wet from your leg bag.   Your catheter should be secured to your upper thigh, do not allow it to hang or dangle.  Your catheter will be removed at your post-operative appointment.

## 2024-01-11 NOTE — ANESTHESIA PREPROCEDURE EVALUATION
Patient: Albert Schwartz    Procedure Information       Anesthesia Start Date/Time: 01/11/24 0731    Procedure: Incision Transurethral Bladder Neck, urethral dilation, pinedo catheter placement LITHOTOMY (Bladder)    Location: [facility] OR 04 / Virtual [facility] OR    Surgeons: Joaquin Gonzales MD            Relevant Problems   Cardiovascular   (+) Chronic diastolic congestive heart failure (CMS/HCC)   (+) Hyperlipidemia   (+) Hypertension, essential   (+) Left bundle branch block   (+) PAD (peripheral artery disease) (CMS/HCC)   (+) Pulmonary hypertension (CMS/HCC)   (+) Supraventricular tachycardia   (+) Venous thromboembolism (VTE)      Endocrine   (+) Diabetic retinopathy (CMS/HCC)   (+) Type 2 diabetes mellitus (CMS/HCC)      GI   (+) Gastro-esophageal reflux disease without esophagitis   (+) Peptic ulcer disease      /Renal   (+) Alcoholic cirrhosis of liver with ascites (CMS/HCC)   (+) Chronic hepatitis C virus infection (CMS/HCC)      Neuro/Psych   (+) Lumbar radiculopathy      Pulmonary   (+) Pulmonary hypertension (CMS/HCC)      GI/Hepatic   (+) Alcoholic cirrhosis of liver with ascites (CMS/HCC)   (+) Chronic hepatitis C virus infection (CMS/HCC)      Hematology   (+) Anemia in chronic kidney disease   (+) Venous thromboembolism (VTE)      Musculoskeletal   (+) Degenerative lumbar disc   (+) Lumbosacral spondylosis without myelopathy   (+) Systemic lupus erythematosus, unspecified (CMS/HCC)      Infectious Disease   (+) Chronic hepatitis C virus infection (CMS/HCC)      Other   (+) Arthritis of both knees   (+) Chronic gouty arthritis       Clinical information reviewed:   Tobacco  Allergies  Meds   Med Hx  Surg Hx   Fam Hx  Soc Hx        NPO Detail:  NPO/Void Status  Date of Last Liquid: 01/11/24  Time of Last Liquid: 0530  Date of Last Solid: 01/10/24  Time of Last Solid: 1730  Last Intake Type: Clear fluids         Physical Exam    Airway  Mallampati: III  TM distance: >3 FB  Neck ROM:  limited  Comments: Can bite upper lip   Cardiovascular   Rhythm: regular  Rate: normal     Dental    Pulmonary   Breath sounds clear to auscultation     Abdominal            Anesthesia Plan    History of general anesthesia?: yes  History of complications of general anesthesia?: no    ASA 3     general     intravenous induction   Anesthetic plan and risks discussed with patient.    Plan discussed with resident and attending.

## 2024-01-12 ASSESSMENT — PAIN SCALES - GENERAL: PAINLEVEL_OUTOF10: 6

## 2024-01-12 NOTE — OP NOTE
Incision Transurethral Bladder Neck, urethral dilation, pinedo catheter placement LITHOTOMY,AND cystolitholapaxy Operative Note     Date: 2024  OR Location: [facility] OR    Name: Albert Schwartz YOB: 1950, Age: 73 y.o., MRN: [id number], Sex: male    Diagnosis  Pre-op Diagnosis     * Benign prostatic hyperplasia with urinary obstruction [N40.1, N13.8]     * Bladder neck contracture [N32.0] Post-op Diagnosis     * Benign prostatic hyperplasia with urinary obstruction [N40.1, N13.8]     * Bladder neck contracture [N32.0]     * Bladder stone [N21.0]     Procedures  Incision Transurethral Bladder Neck, urethral dilation, pinedo catheter placement LITHOTOMY,AND cystolitholapaxy  22428 - MO TRANSURETHRAL RESECTION BLADDER NECK      Surgeons      * Joaquin Gonzales - Primary    Resident/Fellow/Other Assistant:  Surgeon(s) and Role:     * Margret Rodríguez DO - Resident - Assisting    Procedure Summary  Anesthesia: General  ASA: III  Anesthesia Staff: Anesthesiologist: Jessica Romero MD  Anesthesia Resident: Erna Guadalupe MD  Estimated Blood Loss: 3 mL  Intra-op Medications:   Medication Name Total Dose   sodium chloride 0.9 % irrigation solution 3,000 mL   sodium chloride 0.9 % irrigation solution 6,000 mL   sodium chloride 0.9 % irrigation solution 3,000 mL              Anesthesia Record               Intraprocedure I/O Totals       None           Specimen: No specimens collected     Staff:   Circulator: Boogie Roy RN  Scrub Person: Margret Courtney RN; Martine Yeung RN         Drains and/or Catheters:   Urethral Catheter Coude 20 Fr. (Active)   Site Assessment Clean;Skin intact;Painful 24 0930   Collection Container Standard drainage bag 24   Securement Method Securing device (Describe) 24   Reason for Continuing Urinary Catheterization surgical procedures: urological/gynecological, pelvic oncology, anal, prolonged surgical procedure 2430   Output (mL) 175 mL 24 1200        Tourniquet Times:         Implants:     Findings:   Normal appearing urethra without lesions or strictures  Surgically absent prostate  Very narrow pinpoint (~5F) BNC   BNC incised with adequate opening at conclusion of case with excellent hemostasis    Indications: Albert Schwartz is an 73 y.o. male who is having surgery for Benign prostatic hyperplasia with urinary obstruction [N40.1, N13.8]  Bladder neck contracture [N32.0].     The patient was seen in the preoperative area. The risks, benefits, complications, treatment options, non-operative alternatives, expected recovery and outcomes were discussed with the patient. The possibilities of reaction to medication, pulmonary aspiration, injury to surrounding structures, bleeding, recurrent infection, the need for additional procedures, failure to diagnose a condition, and creating a complication requiring transfusion or operation were discussed with the patient. The patient concurred with the proposed plan, giving informed consent.  The site of surgery was properly noted/marked if necessary per policy. The patient has been actively warmed in preoperative area. Preoperative antibiotics have been ordered and given within 1 hours of incision. Venous thrombosis prophylaxis have been ordered including bilateral sequential compression devices    Procedure Details: The patient was then brought back to the operating room and transferred to the OR table. Time out was performed, antibiotics were given, and anesthesia was inducted. Patient was positioned in dorsal lithotomy, and prepped and draped in the usual sterile fashion.     A 22 Fr rigid cystoscope was used to perform cystourethroscopy. There was a pinpoint (~5F) BNC present with calcifications visible. A sensor wire was advanced through the cystoscope and through the contracture into the bladder. A 5F open ended cather was used to confirm position. Sensor wire was removed and replaced with a glide wire. 5F catheter  was removed in push-pull fashion.S-shaped urethral dilators were used to dilate the bladder neck starting with 8F to 20F. The cystoscope was then re-advanced into the bladder and able to traverse the bladder neck. There were numerous bladder stones encountered in the bladder but otherwise cystoscopy revealed a normal bladder capacity without tumors or lesions. The small stones were evacuated through the cystoscope. The large stones were fragmented with the rigid biopsy forceps and removed. The stone diameter total was 3cm. The remainder of the fragments were irrigated out.     The cystoscope was then removed and replaced with the resectoscope using the visual obturator. The visual obturator was removed and replaced with the working element and plasma needle. Incisions in the bladder neck were made at 3 and 9 until there was healthy tissue encountered which was bleeding. Incision was carried distally but not all the way to the verumontanum with care taken not to involve the sphincter. Once adequately opened, hemostasis was obtained and noted to be excellent. Bladder mucosa and bilateral UOs were uninvolved in the procedure. The resectoscope was then removed and a 20F coude catheter was placed with return of clear irrigant. 20cc sterile water inflated in the balloon and pinedo secured with stat-lock. This concluded the procedure. Patient was awoken form anesthesia without complication and was transferred to PACU in stable condition.    Dr. Margret Rodríguez for Dr. Gonzales.    Complications:  None; patient tolerated the procedure well.    Disposition: PACU - hemodynamically stable.  Condition: stable     Additional Details: Resume Xarelto 1/14 if urine clear, pinedo removal 1/15    Attending Attestation:   I was present for the entirety of the procedure(s).     MD Joaquin Ruelas  Phone Number: [phone]

## 2024-01-13 ENCOUNTER — HOSPITAL ENCOUNTER (EMERGENCY)
Facility: HOSPITAL | Age: 74
Discharge: HOME | End: 2024-01-13
Attending: EMERGENCY MEDICINE
Payer: COMMERCIAL

## 2024-01-13 VITALS
BODY MASS INDEX: 27.58 KG/M2 | SYSTOLIC BLOOD PRESSURE: 169 MMHG | HEART RATE: 88 BPM | WEIGHT: 197 LBS | TEMPERATURE: 97.5 F | HEIGHT: 71 IN | RESPIRATION RATE: 18 BRPM | OXYGEN SATURATION: 99 % | DIASTOLIC BLOOD PRESSURE: 96 MMHG

## 2024-01-13 DIAGNOSIS — T83.9XXA COMPLICATION OF FOLEY CATHETER, INITIAL ENCOUNTER (CMS-HCC): Primary | ICD-10-CM

## 2024-01-13 PROCEDURE — 99284 EMERGENCY DEPT VISIT MOD MDM: CPT | Performed by: EMERGENCY MEDICINE

## 2024-01-13 PROCEDURE — 99283 EMERGENCY DEPT VISIT LOW MDM: CPT | Mod: 25

## 2024-01-13 NOTE — ED TRIAGE NOTES
Pt brought in by ems for pinedo catheter falling out today. Pt has some prostate issues and had some skin cut off his penis for the pinedo.

## 2024-01-13 NOTE — ED PROVIDER NOTES
HPI   No chief complaint on file.      Patient is a 73-year-old male with past medical history of BPH complicated by bladder neck contracture status post dilation and Walsh placement 1/11/2024 here because Walsh fell out.  Patient states his daughter was changing out the Walsh bag, and when he went to sit down he noticed that the Walsh had fell out.  Denies any pain, hematuria since then, but states because he has been able to urinate since pain is now starting develop in his lower abdomen.  Denies any fevers, chills, vomiting or back pain.      History provided by:  Patient and medical records  History limited by:  Age   used: No                        No data recorded                Patient History   Past Medical History:   Diagnosis Date    Alcohol abuse, in remission     Arthritis     Bladder neck contracture     Scheduled for surgery with Dr. Gonzales on 12/15/23    Blindness     BPH (benign prostatic hyperplasia)     Chronic diastolic (congestive) heart failure (CMS/HCC) 01/18/2018    Chronic diastolic congestive heart failure    Chronic hepatitis C (CMS/HCC)     Cirrhosis (CMS/HCC)     2/2 ETOH use    Diabetes mellitus (CMS/HCC)     Disorder of male genital organs, unspecified 12/11/2018    Testicular abnormality    ESRD (end stage renal disease) (CMS/[facility])     S/P renal transplant in 2017, F/W Nephrology Dr. Lui, LOV 10/16/2023    GERD (gastroesophageal reflux disease)     Hyperlipidemia     Hypertension     Immunodeficiency, unspecified (CMS/[facility]) 01/18/2018    Immunosuppression    Kidney transplant status     Other ascites     Ascites    Other specified personal risk factors, not elsewhere classified 01/18/2018    At risk for infection transmitted from donor    PE (pulmonary thromboembolism) (CMS/[facility])     on Eliquis, F/W cards    Peripheral vascular disease, unspecified (CMS/[facility]) 01/18/2018    PAD (peripheral artery disease)     Past Surgical History:   Procedure Laterality Date     CATARACT EXTRACTION  01/18/2018    Cataract Extraction    CYSTOSCOPY      ESOPHAGOGASTRODUODENOSCOPY      FL GUIDED ABDOMINAL PARACENTESIS  03/30/2015    FL GUIDED ABDOMINAL PARACENTESIS 3/30/2015 [facility]IB LEGAC[facility]    FL GUIDED ABDOMINAL PARACENTESIS  04/14/2015    FL GUIDED ABDOMINAL PARACENTESIS 4/14/2015 [facility] INPATIENT LEGACY    FL GUIDED ABDOMINAL PARACENTESIS  08/28/2015    FL GUIDED ABDOMINAL PARACENTESIS 8/28/2015 [facility] AIB LEGACY    KIDNEY SURGERY  06/02/2020    Kidney Surgery    OTHER SURGICAL HISTORY  08/18/2014    Brain Surgery    OTHER SURGICAL HISTORY  06/30/2020    Renal Transplant    OTHER SURGICAL HISTORY  06/02/2020    Incisional Hernia Repair    SPLENECTOMY, TOTAL  06/02/2020    Splenectomy    US GUIDED ABDOMINAL PARACENTESIS  03/20/2014    US GUIDED ABDOMINAL PARACENTESIS 3/20/2014 [facility]IB LEGACY    US GUIDED ABDOMINAL PARACENTESIS  08/14/2014    US GUIDED ABDOMINAL PARACENTESIS 8/14/2014 [facility] AIB LEGACY    US GUIDED ABDOMINAL PARACENTESIS  11/17/2014    US GUIDED ABDOMINAL PARACENTESIS 11/17/2014 [facility] AIB LEGACY    US GUIDED ABDOMINAL PARACENTESIS  12/11/2014    US GUIDED ABDOMINAL PARACENTESIS 12/11/2014 [facility] CLINICAL LEGACY    US GUIDED ABDOMINAL PARACENTESIS  01/27/2015    US GUIDED ABDOMINAL PARACENTESIS 1/27/2015 [facility] ANCILLARY LEGACY    US GUIDED ABDOMINAL PARACENTESIS  02/19/2015    US GUIDED ABDOMINAL PARACENTESIS 2/19/2015 [facility] AIB LEGACY    US GUIDED ABDOMINAL PARACENTESIS  03/18/2015    US GUIDED ABDOMINAL PARACENTESIS 3/18/2015 [facility] AIB LEGACY    US GUIDED ABDOMINAL PARACENTESIS  04/17/2015    US GUIDED ABDOMINAL PARACENTESIS 4/17/2015 [facility] INPATIENT LEGACY    US GUIDED ABDOMINAL PARACENTESIS  07/14/2015    US GUIDED ABDOMINAL PARACENTESIS 7/14/2015 [facility]IB LEGACY    US GUIDED ABDOMINAL PARACENTESIS  10/12/2015    US GUIDED ABDOMINAL PARACENTESIS 10/12/2015 [facility] CLINICAL LEGACY    US GUIDED ABDOMINAL PARACENTESIS  10/19/2015    US GUIDED ABDOMINAL PARACENTESIS 10/19/2015 [facility] CLINICAL LEGACY      Family History   Problem Relation Name Age of Onset    Colon cancer Sister      Heart disease Brother       Social History     Tobacco Use    Smoking status: Former     Packs/day: 0.50     Years: 15.00     Additional pack years: 0.00     Total pack years: 7.50     Types: Cigarettes     Quit date:      Years since quittin.0    Smokeless tobacco: Never   Vaping Use    Vaping Use: Never used   Substance Use Topics    Alcohol use: Not Currently    Drug use: Never       Physical Exam   ED Triage Vitals   Temp Pulse Resp BP   -- -- -- --      SpO2 Temp src Heart Rate Source Patient Position   -- -- -- --      BP Location FiO2 (%)     -- --       Physical Exam  Constitutional:       Appearance: Normal appearance.   HENT:      Head: Normocephalic and atraumatic.      Right Ear: External ear normal.      Left Ear: External ear normal.      Nose: Nose normal.      Mouth/Throat:      Mouth: Mucous membranes are moist.      Pharynx: Oropharynx is clear.   Eyes:      Extraocular Movements: Extraocular movements intact.      Conjunctiva/sclera: Conjunctivae normal.      Pupils: Pupils are equal, round, and reactive to light.   Cardiovascular:      Rate and Rhythm: Normal rate and regular rhythm.      Pulses: Normal pulses.      Heart sounds: Normal heart sounds.   Pulmonary:      Breath sounds: Normal breath sounds.   Abdominal:      Palpations: Abdomen is soft.      Comments: Mildly tender over suprapubic region otherwise no rebound or guarding, soft nondistended   Genitourinary:     Comments: No signs of penile urethral trauma.  Musculoskeletal:         General: Normal range of motion.      Cervical back: Normal range of motion and neck supple.   Skin:     General: Skin is warm and dry.      Capillary Refill: Capillary refill takes less than 2 seconds.   Neurological:      General: No focal deficit present.      Mental Status: He is alert and oriented to person, place, and time.   Psychiatric:         Mood and  Affect: Mood normal.         Behavior: Behavior normal.         ED Course & MDM        Medical Decision Making  73-year-old male with recent bladder neck contracture dilation here because his Walsh fell out.  Patient presents hypertensive otherwise hemodynamically stable, no acute distress, mentating properly, afebrile and saturating well on room air.  Physical exam notable for no signs of penile trauma, he does have tenderness over suprapubic region consistent with urinary retention, discussed case with urology who recommended replacing a Walsh as there is no contraindication at this time, 18-gauge Walsh was inserted without complication, began to drain yellow urine, patient symptoms improved, discharged with return precautions and outpatient follow-up.  No signs of sepsis or infection, abdominal exam not consistent with a perforated bladder.    Amount and/or Complexity of Data Reviewed  External Data Reviewed: notes.    Risk  Diagnosis or treatment significantly limited by social determinants of health.        Procedure  Procedures     Carlos Espitia MD  Resident  01/13/24 7927

## 2024-01-13 NOTE — DISCHARGE INSTRUCTIONS
You were seen for a complication with your Walsh catheter.  I discussed the case with your urology team who recommended replacement in the emergency department which was done successfully.  You were then able to void urine without complication, please follow-up with your primary care physician and urologist, and return to the emergency department with new or worsening symptoms.

## 2024-01-15 ENCOUNTER — OFFICE VISIT (OUTPATIENT)
Dept: UROLOGY | Facility: HOSPITAL | Age: 74
End: 2024-01-15
Payer: COMMERCIAL

## 2024-01-15 DIAGNOSIS — N40.1 BENIGN PROSTATIC HYPERPLASIA WITH URINARY OBSTRUCTION: Primary | ICD-10-CM

## 2024-01-15 DIAGNOSIS — N32.0 BLADDER NECK CONTRACTURE: ICD-10-CM

## 2024-01-15 DIAGNOSIS — N13.8 BENIGN PROSTATIC HYPERPLASIA WITH URINARY OBSTRUCTION: Primary | ICD-10-CM

## 2024-01-15 PROCEDURE — 1036F TOBACCO NON-USER: CPT | Performed by: NURSE PRACTITIONER

## 2024-01-15 PROCEDURE — 1159F MED LIST DOCD IN RCRD: CPT | Performed by: NURSE PRACTITIONER

## 2024-01-15 PROCEDURE — 3066F NEPHROPATHY DOC TX: CPT | Performed by: NURSE PRACTITIONER

## 2024-01-15 PROCEDURE — 99024 POSTOP FOLLOW-UP VISIT: CPT | Performed by: NURSE PRACTITIONER

## 2024-01-15 PROCEDURE — 1125F AMNT PAIN NOTED PAIN PRSNT: CPT | Performed by: NURSE PRACTITIONER

## 2024-01-15 PROCEDURE — 51700 IRRIGATION OF BLADDER: CPT | Performed by: NURSE PRACTITIONER

## 2024-01-15 NOTE — PROGRESS NOTES
Albert Schwartz is here for a trial of void. It was explained in detail what the procedure entails, patient understands. Bladder was filled to 100 ml  with sterile water without difficulty. Patient voided 100 ml leaving a residual of 0 ml. Patient tolerated the procedure well.

## 2024-01-15 NOTE — PROGRESS NOTES
Subjective   Patient ID: Albert Schwartz is a 73 y.o. male     HPI  History of BPH and bladder neck contracture. Patient presenting today for TOV s/p Incision Transurethral Bladder neck, Urethral dilation, pinedo catheter placement, and cystolitholapaxy 01/11/2024 with Dr. Gonzales. ED visit 01/13 stating that Pinedo catheter fell out. Catheter exchanged in ED. He reports he has been doing well since surgery. He is eating and drinking, as normal. Urine has remained clear, yellow. Bowels have returned to normal. He is ambulating at baseline. No fevers or chills.    Past Medical History:   Diagnosis Date    Alcohol abuse, in remission     Arthritis     Bladder neck contracture     Scheduled for surgery with Dr. Gonzales on 12/15/23    Blindness     BPH (benign prostatic hyperplasia)     Chronic diastolic (congestive) heart failure (CMS/HCC) 01/18/2018    Chronic diastolic congestive heart failure    Chronic hepatitis C (CMS/HCC)     Cirrhosis (CMS/HCC)     2/2 ETOH use    Diabetes mellitus (CMS/HCC)     Disorder of male genital organs, unspecified 12/11/2018    Testicular abnormality    ESRD (end stage renal disease) (CMS/HCC)     S/P renal transplant in 2017, F/W Nephrology Dr. Liu, LOV 10/16/2023    GERD (gastroesophageal reflux disease)     Hyperlipidemia     Hypertension     Immunodeficiency, unspecified (CMS/HCC) 01/18/2018    Immunosuppression    Kidney transplant status     Other ascites     Ascites    Other specified personal risk factors, not elsewhere classified 01/18/2018    At risk for infection transmitted from donor    PE (pulmonary thromboembolism) (CMS/HCC)     on Eliquis, F/W cards    Peripheral vascular disease, unspecified (CMS/HCC) 01/18/2018    PAD (peripheral artery disease)     Past Surgical History:   Procedure Laterality Date    CATARACT EXTRACTION  01/18/2018    Cataract Extraction    CYSTOSCOPY      ESOPHAGOGASTRODUODENOSCOPY      FL GUIDED ABDOMINAL PARACENTESIS  03/30/2015    FL GUIDED  ABDOMINAL PARACENTESIS 3/30/2015 [facility] [place] [facility] AIB LEGACY    FL GUIDED ABDOMINAL PARACENTESIS  04/14/2015    FL GUIDED ABDOMINAL PARACENTESIS 4/14/2015 [facility] [place] [facility] INPATIENT LEGACY    FL GUIDED ABDOMINAL PARACENTESIS  08/28/2015    FL GUIDED ABDOMINAL PARACENTESIS 8/28/2015 [facility] [place] [facility] AIB LEGACY    KIDNEY SURGERY  06/02/2020    Kidney Surgery    OTHER SURGICAL HISTORY  08/18/2014    Brain Surgery    OTHER SURGICAL HISTORY  06/30/2020    Renal Transplant    OTHER SURGICAL HISTORY  06/02/2020    Incisional Hernia Repair    SPLENECTOMY, TOTAL  06/02/2020    Splenectomy    US GUIDED ABDOMINAL PARACENTESIS  03/20/2014    US GUIDED ABDOMINAL PARACENTESIS 3/20/2014 [facility] [place] AIB LEGACY    US GUIDED ABDOMINAL PARACENTESIS  08/14/2014    US GUIDED ABDOMINAL PARACENTESIS 8/14/2014 [facility] [place] [facility] AIB LEGACY    US GUIDED ABDOMINAL PARACENTESIS  11/17/2014    US GUIDED ABDOMINAL PARACENTESIS 11/17/2014 [facility] [place] [facility] AIB LEGACY    US GUIDED ABDOMINAL PARACENTESIS  12/11/2014    US GUIDED ABDOMINAL PARACENTESIS 12/11/2014 [facility] CLINICAL LEGACY    US GUIDED ABDOMINAL PARACENTESIS  01/27/2015    US GUIDED ABDOMINAL PARACENTESIS 1/27/2015 [facility] [place] [facility] ANCILLARY LEGACY    US GUIDED ABDOMINAL PARACENTESIS  02/19/2015    US GUIDED ABDOMINAL PARACENTESIS 2/19/2015 [facility] [place] [facility] AIB LEGACY    US GUIDED ABDOMINAL PARACENTESIS  03/18/2015    US GUIDED ABDOMINAL PARACENTESIS 3/18/2015 [facility] [place] [facility] AIB LEGACY    US GUIDED ABDOMINAL PARACENTESIS  04/17/2015    US GUIDED ABDOMINAL PARACENTESIS 4/17/2015 [facility] INPATIENT LEGACY    US GUIDED ABDOMINAL PARACENTESIS  07/14/2015    US GUIDED ABDOMINAL PARACENTESIS 7/14/2015 [facility] AIB LEGACY    US GUIDED ABDOMINAL PARACENTESIS  10/12/2015    US GUIDED ABDOMINAL PARACENTESIS 10/12/2015 [facility] CLINICAL LEGACY    US GUIDED ABDOMINAL PARACENTESIS  10/19/2015    US GUIDED ABDOMINAL PARACENTESIS 10/19/2015 [facility] CLINICAL LEGACY     Current Outpatient Medications on File Prior to Visit   Medication Sig Dispense Refill    acetaminophen (Tylenol) 325 mg tablet       amLODIPine (Norvasc) 2.5 mg  tablet TAKE ONE (1) TABLET BY MOUTH ONCE DAILY. 90 tablet 3    artificial tears, dextran-hypomel-glycerin, 0.1-0.3-0.2 % ophthalmic solution Administer 2 drops into both eyes every 4 hours if needed for dry eyes.      atorvastatin (Lipitor) 20 mg tablet Take 1 tablet (20 mg) by mouth once daily.      blood sugar diagnostic (Accu-Chek Tori Plus test strp) strip Use as instructed TWICE DAILY FOR TESTING FOR nON INSULIN DEPENDENT DIABETES E11.319      cholecalciferol (Vitamin D-3) 1,250 mcg (50,000 unit) capsule Take 1 capsule (50,000 Units) by mouth once a week.      docusate sodium (Colace) 100 mg capsule Take 1 capsule (100 mg) by mouth 2 times a day as needed for constipation (stop if having liquid bowel movements) for up to 7 days. 14 capsule 0    insulin degludec (Tresiba FlexTouch) 100 unit/mL (3 mL) injection Inject 30 Units under the skin once daily in the morning. And 10 units in the PM      insulin lispro (HumaLOG) 100 unit/mL injection Inject 0.1 mL (10 Units) under the skin once daily at bedtime.      metoprolol succinate XL (Toprol-XL) 50 mg 24 hr tablet Take 1 tablet (50 mg) by mouth once daily.      mycophenolate (Cellcept) 250 mg capsule TAKE TWO (2) CAPSULES BY MOUTH TWICE DAILY. 360 capsule 3    OneTouch UltraSoft Lancets misc       [] oxyCODONE (Roxicodone) 5 mg immediate release tablet Take 1 tablet (5 mg) by mouth every 6 hours if needed for severe pain (7 - 10) for up to 3 days. 12 tablet 0    polyethylene glycol (Glycolax, Miralax) 17 gram packet Take 17 g by mouth once daily for 7 days. 7 packet 0    polyethylene glycol (Glycolax, Miralax) 17 gram/dose powder Take by mouth.  as directed      prednisoLONE acetate (Pred-Forte) 1 % ophthalmic suspension Administer 1 drop into the left eye 4 times a day. use until your next eye exam      Stool Softener-Laxative 8.6-50 mg tablet       tacrolimus (Prograf) 0.5 mg capsule TAKE TWO (2) CAPSULES BY MOUTH EVERY 12 HOURS. 360 capsule 3     tacrolimus (Prograf) 1 mg capsule Take 1 capsule (1 mg) by mouth twice a day.      tadalafil 20 mg tablet Take 1 tablet (20 mg) by mouth once daily as needed for erectile dysfunction (1 HOUR BEFORE NEEDED).      triamcinolone (Kenalog) 0.1 % cream Apply 1 Application topically. to affected area 2-3 times a day      Xarelto 20 mg tablet Take 1 tablet (20 mg) by mouth once daily in the evening. Take with meals.       No current facility-administered medications on file prior to visit.       Review of Systems  All other systems have been reviewed and are negative for complaint.    Objective   Physical Exam  General: Well developed, well nourished, alert and cooperative, appears in no acute distress  Eyes: Non-injected conjunctiva, sclera clear, no proptosis  Cardiac: Extremities are warm and well perfused. No edema, cyanosis or pallor.   Lungs: Breathing is easy, non-labored. Speaking in clear and complete sentences. Normal diaphragmatic movement.  MSK: Ambulatory with steady gait, unassisted  Neuro: alert and oriented to person, place and time  Psych: Demonstrates good judgement and reason, without hallucinations, abnormal affect or abnormal behaviors.  Skin: no obvious lesions, no rashes.  : urine clear, yellow, no gross hematuria or clots     Assessment/Plan   Diagnoses and all orders for this visit:  Benign prostatic hyperplasia with urinary obstruction  Bladder neck contracture      We discussed postoperative urinary retention in great detail. We discussed that different medications, including anesthesia can influence urinary retention. We discussed that postoperative constipation can also contribute to urinary retention. We discussed management of urinary retention to include indwelling catheter or CIC. We discussed risks of unmanaged urinary retention including irreversible kidney injury and increased risk of UTI. We discussed TOV today.    [] TOV today passed   [] Drink plenty of fluids to maintain hydration  and clear urine output  [] You may have some irritative voiding symptoms over the next few days: burning, frequency, urgency  [] Activity restrictions reviewed    He will return to clinic in 3 months for post-operative visit with Dr. Sifuentes, or sooner if needed.    All questions and concerns were addressed. Patient verbalizes understanding and has no other questions at this time.   Ekaterina Kirk-- JOSIAH MENSAH  Office Phone:  [phone]

## 2024-01-22 ENCOUNTER — APPOINTMENT (OUTPATIENT)
Dept: UROLOGY | Facility: CLINIC | Age: 74
End: 2024-01-22
Payer: COMMERCIAL

## 2024-01-23 ENCOUNTER — APPOINTMENT (OUTPATIENT)
Dept: RADIOLOGY | Facility: HOSPITAL | Age: 74
End: 2024-01-23
Payer: COMMERCIAL

## 2024-01-23 ENCOUNTER — HOSPITAL ENCOUNTER (OUTPATIENT)
Facility: HOSPITAL | Age: 74
Setting detail: OBSERVATION
Discharge: NURSING, REHAB OR LONG-TERM CARE | End: 2024-01-28
Attending: EMERGENCY MEDICINE | Admitting: STUDENT IN AN ORGANIZED HEALTH CARE EDUCATION/TRAINING PROGRAM
Payer: COMMERCIAL

## 2024-01-23 DIAGNOSIS — E11.319 DIABETIC RETINOPATHY WITHOUT MACULAR EDEMA ASSOCIATED WITH TYPE 2 DIABETES MELLITUS, UNSPECIFIED LATERALITY, UNSPECIFIED RETINOPATHY SEVERITY (MULTI): ICD-10-CM

## 2024-01-23 DIAGNOSIS — I10 HYPERTENSION, ESSENTIAL: ICD-10-CM

## 2024-01-23 DIAGNOSIS — I82.401 DEEP VEIN THROMBOSIS (DVT) OF RIGHT LOWER EXTREMITY, UNSPECIFIED CHRONICITY, UNSPECIFIED VEIN (MULTI): ICD-10-CM

## 2024-01-23 DIAGNOSIS — N13.8 BENIGN PROSTATIC HYPERPLASIA WITH URINARY OBSTRUCTION: ICD-10-CM

## 2024-01-23 DIAGNOSIS — D63.1 ANEMIA IN CHRONIC KIDNEY DISEASE, UNSPECIFIED CKD STAGE: ICD-10-CM

## 2024-01-23 DIAGNOSIS — N40.1 BENIGN PROSTATIC HYPERPLASIA WITH URINARY OBSTRUCTION: ICD-10-CM

## 2024-01-23 DIAGNOSIS — N18.9 ANEMIA IN CHRONIC KIDNEY DISEASE, UNSPECIFIED CKD STAGE: ICD-10-CM

## 2024-01-23 DIAGNOSIS — R26.2 UNABLE TO AMBULATE: ICD-10-CM

## 2024-01-23 DIAGNOSIS — N32.0 BLADDER NECK CONTRACTURE: ICD-10-CM

## 2024-01-23 DIAGNOSIS — R53.1 WEAKNESS: ICD-10-CM

## 2024-01-23 DIAGNOSIS — E55.9 VITAMIN D DEFICIENCY: ICD-10-CM

## 2024-01-23 DIAGNOSIS — T83.9XXA COMPLICATION OF FOLEY CATHETER, INITIAL ENCOUNTER (CMS-HCC): ICD-10-CM

## 2024-01-23 DIAGNOSIS — Z86.711 PERSONAL HISTORY OF PULMONARY EMBOLISM: ICD-10-CM

## 2024-01-23 DIAGNOSIS — R62.7 FAILURE TO THRIVE IN ADULT: Primary | ICD-10-CM

## 2024-01-23 LAB
ALBUMIN SERPL BCP-MCNC: 3.9 G/DL (ref 3.4–5)
ALP SERPL-CCNC: 42 U/L (ref 33–136)
ALT SERPL W P-5'-P-CCNC: 8 U/L (ref 10–52)
ANION GAP SERPL CALC-SCNC: 16 MMOL/L (ref 10–20)
AST SERPL W P-5'-P-CCNC: 17 U/L (ref 9–39)
BASOPHILS # BLD AUTO: 0.05 X10*3/UL (ref 0–0.1)
BASOPHILS NFR BLD AUTO: 0.7 %
BILIRUB SERPL-MCNC: 0.6 MG/DL (ref 0–1.2)
BUN SERPL-MCNC: 18 MG/DL (ref 6–23)
CALCIUM SERPL-MCNC: 9.7 MG/DL (ref 8.6–10.6)
CHLORIDE SERPL-SCNC: 106 MMOL/L (ref 98–107)
CO2 SERPL-SCNC: 19 MMOL/L (ref 21–32)
CREAT SERPL-MCNC: 1.04 MG/DL (ref 0.5–1.3)
EGFRCR SERPLBLD CKD-EPI 2021: 76 ML/MIN/1.73M*2
EOSINOPHIL # BLD AUTO: 0.1 X10*3/UL (ref 0–0.4)
EOSINOPHIL NFR BLD AUTO: 1.4 %
ERYTHROCYTE [DISTWIDTH] IN BLOOD BY AUTOMATED COUNT: 17.6 % (ref 11.5–14.5)
GLUCOSE SERPL-MCNC: 274 MG/DL (ref 74–99)
HCT VFR BLD AUTO: 30.2 % (ref 41–52)
HGB BLD-MCNC: 10.6 G/DL (ref 13.5–17.5)
IMM GRANULOCYTES # BLD AUTO: 0.03 X10*3/UL (ref 0–0.5)
IMM GRANULOCYTES NFR BLD AUTO: 0.4 % (ref 0–0.9)
LYMPHOCYTES # BLD AUTO: 2.55 X10*3/UL (ref 0.8–3)
LYMPHOCYTES NFR BLD AUTO: 36.7 %
MCH RBC QN AUTO: 25.7 PG (ref 26–34)
MCHC RBC AUTO-ENTMCNC: 35.1 G/DL (ref 32–36)
MCV RBC AUTO: 73 FL (ref 80–100)
MONOCYTES # BLD AUTO: 0.66 X10*3/UL (ref 0.05–0.8)
MONOCYTES NFR BLD AUTO: 9.5 %
NEUTROPHILS # BLD AUTO: 3.55 X10*3/UL (ref 1.6–5.5)
NEUTROPHILS NFR BLD AUTO: 51.3 %
NRBC BLD-RTO: 0 /100 WBCS (ref 0–0)
PLATELET # BLD AUTO: 416 X10*3/UL (ref 150–450)
POTASSIUM SERPL-SCNC: 3.8 MMOL/L (ref 3.5–5.3)
PROT SERPL-MCNC: 7.8 G/DL (ref 6.4–8.2)
RBC # BLD AUTO: 4.12 X10*6/UL (ref 4.5–5.9)
SODIUM SERPL-SCNC: 137 MMOL/L (ref 136–145)
WBC # BLD AUTO: 6.9 X10*3/UL (ref 4.4–11.3)

## 2024-01-23 PROCEDURE — 2500000001 HC RX 250 WO HCPCS SELF ADMINISTERED DRUGS (ALT 637 FOR MEDICARE OP): Performed by: NURSE PRACTITIONER

## 2024-01-23 PROCEDURE — 99285 EMERGENCY DEPT VISIT HI MDM: CPT | Mod: 25,27 | Performed by: EMERGENCY MEDICINE

## 2024-01-23 PROCEDURE — 76857 US EXAM PELVIC LIMITED: CPT

## 2024-01-23 PROCEDURE — 36415 COLL VENOUS BLD VENIPUNCTURE: CPT | Performed by: NURSE PRACTITIONER

## 2024-01-23 PROCEDURE — 83036 HEMOGLOBIN GLYCOSYLATED A1C: CPT

## 2024-01-23 PROCEDURE — 80053 COMPREHEN METABOLIC PANEL: CPT | Performed by: NURSE PRACTITIONER

## 2024-01-23 PROCEDURE — 99285 EMERGENCY DEPT VISIT HI MDM: CPT | Performed by: NURSE PRACTITIONER

## 2024-01-23 PROCEDURE — 80197 ASSAY OF TACROLIMUS: CPT | Performed by: EMERGENCY MEDICINE

## 2024-01-23 PROCEDURE — 72148 MRI LUMBAR SPINE W/O DYE: CPT

## 2024-01-23 PROCEDURE — 2500000005 HC RX 250 GENERAL PHARMACY W/O HCPCS: Performed by: NURSE PRACTITIONER

## 2024-01-23 PROCEDURE — 2500000004 HC RX 250 GENERAL PHARMACY W/ HCPCS (ALT 636 FOR OP/ED): Performed by: NURSE PRACTITIONER

## 2024-01-23 PROCEDURE — 85025 COMPLETE CBC W/AUTO DIFF WBC: CPT | Performed by: NURSE PRACTITIONER

## 2024-01-23 PROCEDURE — 76857 US EXAM PELVIC LIMITED: CPT | Performed by: EMERGENCY MEDICINE

## 2024-01-23 PROCEDURE — 96365 THER/PROPH/DIAG IV INF INIT: CPT | Mod: 59

## 2024-01-23 RX ORDER — CYCLOBENZAPRINE HCL 10 MG
5 TABLET ORAL ONCE
Status: COMPLETED | OUTPATIENT
Start: 2024-01-23 | End: 2024-01-23

## 2024-01-23 RX ORDER — ACETAMINOPHEN 325 MG/1
650 TABLET ORAL ONCE
Status: COMPLETED | OUTPATIENT
Start: 2024-01-23 | End: 2024-01-23

## 2024-01-23 RX ORDER — LIDOCAINE 560 MG/1
1 PATCH PERCUTANEOUS; TOPICAL; TRANSDERMAL DAILY
Status: DISCONTINUED | OUTPATIENT
Start: 2024-01-23 | End: 2024-01-29 | Stop reason: HOSPADM

## 2024-01-23 RX ORDER — HYDROMORPHONE HYDROCHLORIDE 1 MG/ML
0.5 INJECTION, SOLUTION INTRAMUSCULAR; INTRAVENOUS; SUBCUTANEOUS ONCE
Status: COMPLETED | OUTPATIENT
Start: 2024-01-23 | End: 2024-01-23

## 2024-01-23 RX ORDER — IBUPROFEN 600 MG/1
600 TABLET ORAL ONCE
Status: DISCONTINUED | OUTPATIENT
Start: 2024-01-23 | End: 2024-01-23

## 2024-01-23 RX ADMIN — HYDROMORPHONE HYDROCHLORIDE 0.5 MG: 1 INJECTION, SOLUTION INTRAMUSCULAR; INTRAVENOUS; SUBCUTANEOUS at 23:54

## 2024-01-23 RX ADMIN — LIDOCAINE 1 PATCH: 4 PATCH TOPICAL at 21:34

## 2024-01-23 RX ADMIN — ACETAMINOPHEN 650 MG: 325 TABLET ORAL at 21:34

## 2024-01-23 RX ADMIN — CYCLOBENZAPRINE 5 MG: 10 TABLET, FILM COATED ORAL at 21:34

## 2024-01-23 ASSESSMENT — COLUMBIA-SUICIDE SEVERITY RATING SCALE - C-SSRS
1. IN THE PAST MONTH, HAVE YOU WISHED YOU WERE DEAD OR WISHED YOU COULD GO TO SLEEP AND NOT WAKE UP?: NO
2. HAVE YOU ACTUALLY HAD ANY THOUGHTS OF KILLING YOURSELF?: NO
6. HAVE YOU EVER DONE ANYTHING, STARTED TO DO ANYTHING, OR PREPARED TO DO ANYTHING TO END YOUR LIFE?: NO

## 2024-01-23 ASSESSMENT — ENCOUNTER SYMPTOMS: BACK PAIN: 1

## 2024-01-23 ASSESSMENT — LIFESTYLE VARIABLES
REASON UNABLE TO ASSESS: NO
EVER HAD A DRINK FIRST THING IN THE MORNING TO STEADY YOUR NERVES TO GET RID OF A HANGOVER: NO
HAVE YOU EVER FELT YOU SHOULD CUT DOWN ON YOUR DRINKING: NO
HAVE PEOPLE ANNOYED YOU BY CRITICIZING YOUR DRINKING: NO
EVER FELT BAD OR GUILTY ABOUT YOUR DRINKING: NO

## 2024-01-24 PROBLEM — R62.7 FAILURE TO THRIVE IN ADULT: Status: ACTIVE | Noted: 2024-01-24

## 2024-01-24 LAB
APPEARANCE UR: ABNORMAL
BILIRUB UR STRIP.AUTO-MCNC: NEGATIVE MG/DL
COLOR UR: YELLOW
EST. AVERAGE GLUCOSE BLD GHB EST-MCNC: 131 MG/DL
GLUCOSE BLD MANUAL STRIP-MCNC: 197 MG/DL (ref 74–99)
GLUCOSE BLD MANUAL STRIP-MCNC: 203 MG/DL (ref 74–99)
GLUCOSE UR STRIP.AUTO-MCNC: ABNORMAL MG/DL
HBA1C MFR BLD: 6.2 %
HOLD SPECIMEN: NORMAL
KETONES UR STRIP.AUTO-MCNC: NEGATIVE MG/DL
LEUKOCYTE ESTERASE UR QL STRIP.AUTO: ABNORMAL
MUCOUS THREADS #/AREA URNS AUTO: ABNORMAL /LPF
NITRITE UR QL STRIP.AUTO: NEGATIVE
PH UR STRIP.AUTO: 6 [PH]
PROT UR STRIP.AUTO-MCNC: ABNORMAL MG/DL
RBC # UR STRIP.AUTO: ABNORMAL /UL
RBC #/AREA URNS AUTO: >20 /HPF
SP GR UR STRIP.AUTO: 1.02
TACROLIMUS BLD-MCNC: 11 NG/ML
UROBILINOGEN UR STRIP.AUTO-MCNC: <2 MG/DL
WBC #/AREA URNS AUTO: >50 /HPF

## 2024-01-24 PROCEDURE — G0378 HOSPITAL OBSERVATION PER HR: HCPCS

## 2024-01-24 PROCEDURE — 87086 URINE CULTURE/COLONY COUNT: CPT | Performed by: NURSE PRACTITIONER

## 2024-01-24 PROCEDURE — 99223 1ST HOSP IP/OBS HIGH 75: CPT

## 2024-01-24 PROCEDURE — 2500000001 HC RX 250 WO HCPCS SELF ADMINISTERED DRUGS (ALT 637 FOR MEDICARE OP)

## 2024-01-24 PROCEDURE — 97165 OT EVAL LOW COMPLEX 30 MIN: CPT | Mod: GO

## 2024-01-24 PROCEDURE — 81001 URINALYSIS AUTO W/SCOPE: CPT | Performed by: NURSE PRACTITIONER

## 2024-01-24 PROCEDURE — 72148 MRI LUMBAR SPINE W/O DYE: CPT | Performed by: STUDENT IN AN ORGANIZED HEALTH CARE EDUCATION/TRAINING PROGRAM

## 2024-01-24 PROCEDURE — 2500000002 HC RX 250 W HCPCS SELF ADMINISTERED DRUGS (ALT 637 FOR MEDICARE OP, ALT 636 FOR OP/ED)

## 2024-01-24 PROCEDURE — 97161 PT EVAL LOW COMPLEX 20 MIN: CPT | Mod: GP

## 2024-01-24 PROCEDURE — 99221 1ST HOSP IP/OBS SF/LOW 40: CPT | Performed by: ORTHOPAEDIC SURGERY

## 2024-01-24 PROCEDURE — 80180 DRUG SCRN QUAN MYCOPHENOLATE: CPT | Performed by: EMERGENCY MEDICINE

## 2024-01-24 PROCEDURE — 2500000004 HC RX 250 GENERAL PHARMACY W/ HCPCS (ALT 636 FOR OP/ED)

## 2024-01-24 PROCEDURE — 36415 COLL VENOUS BLD VENIPUNCTURE: CPT | Performed by: EMERGENCY MEDICINE

## 2024-01-24 PROCEDURE — 96375 TX/PRO/DX INJ NEW DRUG ADDON: CPT | Mod: 59

## 2024-01-24 PROCEDURE — 82947 ASSAY GLUCOSE BLOOD QUANT: CPT

## 2024-01-24 RX ORDER — INSULIN LISPRO 100 [IU]/ML
0-5 INJECTION, SOLUTION INTRAVENOUS; SUBCUTANEOUS
Status: DISCONTINUED | OUTPATIENT
Start: 2024-01-24 | End: 2024-01-29 | Stop reason: HOSPADM

## 2024-01-24 RX ORDER — AMLODIPINE BESYLATE 5 MG/1
2.5 TABLET ORAL DAILY
Status: DISCONTINUED | OUTPATIENT
Start: 2024-01-24 | End: 2024-01-25

## 2024-01-24 RX ORDER — TACROLIMUS 1 MG/1
1 CAPSULE ORAL EVERY 12 HOURS
Status: DISCONTINUED | OUTPATIENT
Start: 2024-01-24 | End: 2024-01-24

## 2024-01-24 RX ORDER — OXYCODONE HYDROCHLORIDE 5 MG/1
5 TABLET ORAL EVERY 6 HOURS PRN
Status: DISCONTINUED | OUTPATIENT
Start: 2024-01-24 | End: 2024-01-25

## 2024-01-24 RX ORDER — ATORVASTATIN CALCIUM 20 MG/1
20 TABLET, FILM COATED ORAL DAILY
Status: DISCONTINUED | OUTPATIENT
Start: 2024-01-24 | End: 2024-01-29 | Stop reason: HOSPADM

## 2024-01-24 RX ORDER — HYDROMORPHONE HYDROCHLORIDE 1 MG/ML
0.5 INJECTION, SOLUTION INTRAMUSCULAR; INTRAVENOUS; SUBCUTANEOUS ONCE
Status: COMPLETED | OUTPATIENT
Start: 2024-01-24 | End: 2024-01-24

## 2024-01-24 RX ORDER — MYCOPHENOLATE MOFETIL 250 MG/1
500 CAPSULE ORAL 2 TIMES DAILY
Status: DISCONTINUED | OUTPATIENT
Start: 2024-01-24 | End: 2024-01-29 | Stop reason: HOSPADM

## 2024-01-24 RX ORDER — POLYETHYLENE GLYCOL 3350 17 G/17G
17 POWDER, FOR SOLUTION ORAL DAILY
Status: DISCONTINUED | OUTPATIENT
Start: 2024-01-24 | End: 2024-01-27

## 2024-01-24 RX ORDER — PHENAZOPYRIDINE HYDROCHLORIDE 100 MG/1
95 TABLET, FILM COATED ORAL 3 TIMES DAILY PRN
Status: DISCONTINUED | OUTPATIENT
Start: 2024-01-24 | End: 2024-01-29 | Stop reason: HOSPADM

## 2024-01-24 RX ORDER — INSULIN DEGLUDEC 100 U/ML
15 INJECTION, SOLUTION SUBCUTANEOUS EVERY 24 HOURS
Status: DISCONTINUED | OUTPATIENT
Start: 2024-01-25 | End: 2024-01-29 | Stop reason: HOSPADM

## 2024-01-24 RX ORDER — ARTIFICIAL TEARS 1; 2; 3 MG/ML; MG/ML; MG/ML
2 SOLUTION/ DROPS OPHTHALMIC EVERY 4 HOURS PRN
Status: DISCONTINUED | OUTPATIENT
Start: 2024-01-24 | End: 2024-01-29 | Stop reason: HOSPADM

## 2024-01-24 RX ORDER — ORPHENADRINE CITRATE 30 MG/ML
60 INJECTION INTRAMUSCULAR; INTRAVENOUS ONCE
Status: COMPLETED | OUTPATIENT
Start: 2024-01-24 | End: 2024-01-24

## 2024-01-24 RX ORDER — ACETAMINOPHEN 160 MG/5ML
650 SOLUTION ORAL EVERY 4 HOURS PRN
Status: DISCONTINUED | OUTPATIENT
Start: 2024-01-24 | End: 2024-01-29 | Stop reason: HOSPADM

## 2024-01-24 RX ORDER — TADALAFIL 20 MG/1
20 TABLET ORAL DAILY PRN
Status: DISCONTINUED | OUTPATIENT
Start: 2024-01-24 | End: 2024-01-24

## 2024-01-24 RX ORDER — METOPROLOL SUCCINATE 50 MG/1
50 TABLET, EXTENDED RELEASE ORAL DAILY
Status: DISCONTINUED | OUTPATIENT
Start: 2024-01-24 | End: 2024-01-29 | Stop reason: HOSPADM

## 2024-01-24 RX ORDER — CEFEPIME 1 G/50ML
2 INJECTION, SOLUTION INTRAVENOUS EVERY 12 HOURS
Status: DISCONTINUED | OUTPATIENT
Start: 2024-01-24 | End: 2024-01-24

## 2024-01-24 RX ORDER — ACETAMINOPHEN 650 MG/1
650 SUPPOSITORY RECTAL EVERY 4 HOURS PRN
Status: DISCONTINUED | OUTPATIENT
Start: 2024-01-24 | End: 2024-01-29 | Stop reason: HOSPADM

## 2024-01-24 RX ORDER — CEFEPIME 1 G/50ML
1 INJECTION, SOLUTION INTRAVENOUS EVERY 12 HOURS
Status: DISCONTINUED | OUTPATIENT
Start: 2024-01-24 | End: 2024-01-26

## 2024-01-24 RX ORDER — TACROLIMUS 1 MG/1
1 CAPSULE ORAL EVERY 12 HOURS
Status: DISCONTINUED | OUTPATIENT
Start: 2024-01-24 | End: 2024-01-28

## 2024-01-24 RX ORDER — PHENAZOPYRIDINE HYDROCHLORIDE 200 MG/1
200 TABLET, FILM COATED ORAL 3 TIMES DAILY PRN
COMMUNITY

## 2024-01-24 RX ORDER — OXYCODONE HYDROCHLORIDE 5 MG/1
5 TABLET ORAL EVERY 6 HOURS PRN
COMMUNITY
End: 2024-01-25

## 2024-01-24 RX ORDER — DEXTROSE MONOHYDRATE 100 MG/ML
200 INJECTION, SOLUTION INTRAVENOUS ONCE AS NEEDED
Status: DISCONTINUED | OUTPATIENT
Start: 2024-01-24 | End: 2024-01-29 | Stop reason: HOSPADM

## 2024-01-24 RX ORDER — ACETAMINOPHEN 325 MG/1
650 TABLET ORAL EVERY 4 HOURS PRN
Status: DISCONTINUED | OUTPATIENT
Start: 2024-01-24 | End: 2024-01-29 | Stop reason: HOSPADM

## 2024-01-24 RX ORDER — DEXTROSE 50 % IN WATER (D50W) INTRAVENOUS SYRINGE
25
Status: DISCONTINUED | OUTPATIENT
Start: 2024-01-24 | End: 2024-01-29 | Stop reason: HOSPADM

## 2024-01-24 RX ORDER — ACETAMINOPHEN 325 MG/1
650 TABLET ORAL ONCE
Status: DISCONTINUED | OUTPATIENT
Start: 2024-01-24 | End: 2024-01-29 | Stop reason: HOSPADM

## 2024-01-24 RX ADMIN — TACROLIMUS 1 MG: 1 CAPSULE ORAL at 20:17

## 2024-01-24 RX ADMIN — AMLODIPINE BESYLATE 2.5 MG: 5 TABLET ORAL at 15:59

## 2024-01-24 RX ADMIN — MYCOPHENOLATE MOFETIL 500 MG: 250 CAPSULE ORAL at 15:58

## 2024-01-24 RX ADMIN — ATORVASTATIN CALCIUM 20 MG: 20 TABLET, FILM COATED ORAL at 15:59

## 2024-01-24 RX ADMIN — CEFEPIME 1 G: 1 INJECTION, SOLUTION INTRAVENOUS at 18:57

## 2024-01-24 RX ADMIN — ORPHENADRINE CITRATE 60 MG: 60 INJECTION INTRAMUSCULAR; INTRAVENOUS at 12:19

## 2024-01-24 RX ADMIN — INSULIN LISPRO 2 UNITS: 100 INJECTION, SOLUTION INTRAVENOUS; SUBCUTANEOUS at 20:16

## 2024-01-24 RX ADMIN — HYDROMORPHONE HYDROCHLORIDE 0.5 MG: 1 INJECTION, SOLUTION INTRAMUSCULAR; INTRAVENOUS; SUBCUTANEOUS at 07:37

## 2024-01-24 RX ADMIN — OXYCODONE HYDROCHLORIDE 5 MG: 5 TABLET ORAL at 16:56

## 2024-01-24 RX ADMIN — POLYETHYLENE GLYCOL 3350 17 G: 17 POWDER, FOR SOLUTION ORAL at 15:59

## 2024-01-24 RX ADMIN — METOPROLOL SUCCINATE 50 MG: 50 TABLET, EXTENDED RELEASE ORAL at 15:58

## 2024-01-24 RX ADMIN — RIVAROXABAN 20 MG: 20 TABLET, FILM COATED ORAL at 16:00

## 2024-01-24 ASSESSMENT — COGNITIVE AND FUNCTIONAL STATUS - GENERAL
CLIMB 3 TO 5 STEPS WITH RAILING: TOTAL
DRESSING REGULAR UPPER BODY CLOTHING: A LOT
MOVING TO AND FROM BED TO CHAIR: TOTAL
TURNING FROM BACK TO SIDE WHILE IN FLAT BAD: A LITTLE
DRESSING REGULAR LOWER BODY CLOTHING: TOTAL
HELP NEEDED FOR BATHING: A LOT
MOVING FROM LYING ON BACK TO SITTING ON SIDE OF FLAT BED WITH BEDRAILS: A LITTLE
PERSONAL GROOMING: A LITTLE
MOBILITY SCORE: 11
TOILETING: TOTAL
WALKING IN HOSPITAL ROOM: TOTAL
DAILY ACTIVITIY SCORE: 13
STANDING UP FROM CHAIR USING ARMS: A LOT

## 2024-01-24 ASSESSMENT — ACTIVITIES OF DAILY LIVING (ADL)
BATHING_ASSISTANCE: MODERATE
ADL_ASSISTANCE: NEEDS ASSISTANCE
ADL_ASSISTANCE: NEEDS ASSISTANCE

## 2024-01-24 ASSESSMENT — ENCOUNTER SYMPTOMS
SPEECH DIFFICULTY: 0
HEMATURIA: 0
DIZZINESS: 0
NECK STIFFNESS: 0
HEADACHES: 0
NECK PAIN: 0
NAUSEA: 0
DIAPHORESIS: 0
CONSTIPATION: 0
FREQUENCY: 0
LIGHT-HEADEDNESS: 0
CHILLS: 0
NERVOUS/ANXIOUS: 0
APNEA: 0
SHORTNESS OF BREATH: 0
FLANK PAIN: 0
ARTHRALGIAS: 0
DIARRHEA: 0
PALPITATIONS: 0
NUMBNESS: 1
DYSURIA: 0
ABDOMINAL PAIN: 0
VOMITING: 0
COUGH: 0
FEVER: 0
HYPERACTIVE: 0
WHEEZING: 0
BACK PAIN: 1
WEAKNESS: 1
DIFFICULTY URINATING: 0
CHEST TIGHTNESS: 0
ABDOMINAL DISTENTION: 0
WOUND: 0
SORE THROAT: 0
BLOOD IN STOOL: 0
FACIAL SWELLING: 0

## 2024-01-24 ASSESSMENT — PAIN SCALES - GENERAL
PAINLEVEL_OUTOF10: 2
PAINLEVEL_OUTOF10: 2

## 2024-01-24 ASSESSMENT — PAIN DESCRIPTION - DESCRIPTORS: DESCRIPTORS: SHARP

## 2024-01-24 ASSESSMENT — PAIN - FUNCTIONAL ASSESSMENT
PAIN_FUNCTIONAL_ASSESSMENT: 0-10

## 2024-01-24 ASSESSMENT — PAIN DESCRIPTION - LOCATION: LOCATION: BACK

## 2024-01-24 NOTE — ED PROVIDER NOTES
Emergency Medicine Transition of Care Note.    I received Albert Schwartz in signout from night shift resident .  Please see the previous ED provider note for all HPI, PE and MDM up to the time of signout at 7 am. This is in addition to the primary record.    In brief Albert Schwartz is an 73 y.o. male presenting for back pain and concern for urinary retention.  Patient has a history of CHF hypertension DVT with PEs BPH and a kidney transplant.  He had a Walsh that was taken out approximately 2 days ago.  He came in last night with sciatica pain.  Patient was unable to ambulate due to pain.  During his time in the emergency department and orthopedic spine was consulted and patient had an MRI.  The MRI showed bulging disks.  Spine has signed off saying it is okay to discharge home based on their examination.  However the patient states that he is unable to take care of himself at home and would like to be admitted to a skilled nursing facility.  PT OT and social service orders have been placed.  Yesterday and today he has received Dilaudid Flexeril lidocaine patches and Tylenol for pain.  The current plan is to have a PT OT eval and see if he can have placement in a skilled rehab facility.    Chief Complaint   Patient presents with    Back Pain     Diagnoses as of 01/24/24 1800   Unable to ambulate       Results for orders placed or performed during the hospital encounter of 01/23/24   Urinalysis with Reflex Culture and Microscopic   Result Value Ref Range    Color, Urine Yellow Straw, Yellow    Appearance, Urine Hazy (N) Clear    Specific Gravity, Urine 1.019 1.005 - 1.035    pH, Urine 6.0 5.0, 5.5, 6.0, 6.5, 7.0, 7.5, 8.0    Protein, Urine 100 (2+) (N) NEGATIVE mg/dL    Glucose, Urine 50 (1+) (A) NEGATIVE mg/dL    Blood, Urine LARGE (3+) (A) NEGATIVE    Ketones, Urine NEGATIVE NEGATIVE mg/dL    Bilirubin, Urine NEGATIVE NEGATIVE    Urobilinogen, Urine <2.0 <2.0 mg/dL    Nitrite, Urine NEGATIVE NEGATIVE    Leukocyte  Esterase, Urine MODERATE (2+) (A) NEGATIVE   CBC and Auto Differential   Result Value Ref Range    WBC 6.9 4.4 - 11.3 x10*3/uL    nRBC 0.0 0.0 - 0.0 /100 WBCs    RBC 4.12 (L) 4.50 - 5.90 x10*6/uL    Hemoglobin 10.6 (L) 13.5 - 17.5 g/dL    Hematocrit 30.2 (L) 41.0 - 52.0 %    MCV 73 (L) 80 - 100 fL    MCH 25.7 (L) 26.0 - 34.0 pg    MCHC 35.1 32.0 - 36.0 g/dL    RDW 17.6 (H) 11.5 - 14.5 %    Platelets 416 150 - 450 x10*3/uL    Neutrophils % 51.3 40.0 - 80.0 %    Immature Granulocytes %, Automated 0.4 0.0 - 0.9 %    Lymphocytes % 36.7 13.0 - 44.0 %    Monocytes % 9.5 2.0 - 10.0 %    Eosinophils % 1.4 0.0 - 6.0 %    Basophils % 0.7 0.0 - 2.0 %    Neutrophils Absolute 3.55 1.60 - 5.50 x10*3/uL    Immature Granulocytes Absolute, Automated 0.03 0.00 - 0.50 x10*3/uL    Lymphocytes Absolute 2.55 0.80 - 3.00 x10*3/uL    Monocytes Absolute 0.66 0.05 - 0.80 x10*3/uL    Eosinophils Absolute 0.10 0.00 - 0.40 x10*3/uL    Basophils Absolute 0.05 0.00 - 0.10 x10*3/uL   Comprehensive metabolic panel   Result Value Ref Range    Glucose 274 (H) 74 - 99 mg/dL    Sodium 137 136 - 145 mmol/L    Potassium 3.8 3.5 - 5.3 mmol/L    Chloride 106 98 - 107 mmol/L    Bicarbonate 19 (L) 21 - 32 mmol/L    Anion Gap 16 10 - 20 mmol/L    Urea Nitrogen 18 6 - 23 mg/dL    Creatinine 1.04 0.50 - 1.30 mg/dL    eGFR 76 >60 mL/min/1.73m*2    Calcium 9.7 8.6 - 10.6 mg/dL    Albumin 3.9 3.4 - 5.0 g/dL    Alkaline Phosphatase 42 33 - 136 U/L    Total Protein 7.8 6.4 - 8.2 g/dL    AST 17 9 - 39 U/L    Bilirubin, Total 0.6 0.0 - 1.2 mg/dL    ALT 8 (L) 10 - 52 U/L   Tacrolimus level   Result Value Ref Range    Tacrolimus  11.0 <=15.0 ng/mL   Urine Gray Tube   Result Value Ref Range    Extra Tube Hold for add-ons.    Microscopic Only, Urine   Result Value Ref Range    WBC, Urine >50 (A) 1-5, NONE /HPF    RBC, Urine >20 (A) NONE, 1-2, 3-5 /HPF    Mucus, Urine 1+ Reference range not established. /LPF       Medical Decision Making  Physical therapy and Occupational  Therapy have evaluated the patient.  They have recommended that he be placed in a skilled nursing facility.  Social work has talked with the patient and found out that he does get home health care that comes to his house 6 days a week and is supposed to be there for 6 hours.  However his daughter states that he sends them home without allowing them to care for him.  This has been going on since November.  Apparently he has gone through multiple companies of caregivers.  Patient would like to be admitted to skilled rehab.  He would like to go back to the 1 that he was placed before.  Our  has made phone calls and they have accepted him to the skilled nursing facility.  However they do require admission to the hospital before they can accept him into the skilled nursing facility.  Patient continues to wear the lidocaine patch he has been given Norflex and Tylenol for pain control.  Currently we are waiting hospital placement.  Report will be given to oncjuan everett at 1900    Final diagnoses:   [R62.7] Failure to thrive in adult       DISPOSITION  Disposition: Admit to med/surg floor  Patient condition is stable    YESSY Salomon APRN-CNP  01/24/24 1911

## 2024-01-24 NOTE — PROGRESS NOTES
Occupational Therapy    Evaluation    Patient Name: Albert Schwartz  MRN: [id number]  Today's Date: 1/24/2024  Time Calculation  Start Time: 1151  Stop Time: 1214  Time Calculation (min): 23 min    Assessment  IP OT Assessment  OT Assessment: Patient overall function limited by pain this date. Patient requiring assist x2 for observed functional mobility at EOB and poor walker management noted. Recommend MOD intensity OT services when medically appropriate for discharge and continued OT services in acute care setting to improve function.  Prognosis: Good  Evaluation/Treatment Tolerance: Patient limited by pain  Medical Staff Made Aware: Yes  End of Session Communication: Bedside nurse  End of Session Patient Position:  (one siderail up, one siderail down on cart with tray table next to patient.)  Plan:  Treatment Interventions: ADL retraining, Functional transfer training, Visual perceptual retraining, UE strengthening/ROM, Endurance training, Cognitive reorientation, Patient/family training, Neuromuscular reeducation, Equipment evaluation/education, Fine motor coordination activities, Compensatory technique education  OT Frequency: 2 times per week  OT Discharge Recommendations: Moderate intensity level of continued care  Equipment Recommended upon Discharge: Wheeled walker  OT Recommended Transfer Status: Assist of 2  OT - OK to Discharge: Yes (when medically appropriate)    Subjective   Current Problem:  No diagnosis found.  General:  General  Reason for Referral: 74 y/o M presenting to  ED on 1/23 with 10/10 back pain. Per Ortho, MRI L-spine w/ L3/4, L4/5 DDD, mild canal stenosis, and mild/mod bilateral foraminal stenosis and no plan for orthopedic intervention.  Past Medical History Relevant to Rehab: BPH, bladder neck contracture status post dilation and Walsh placement and subsequent removal January 2024, ESRD, CHF, HTN, DVT/PE  Family/Caregiver Present: No  Co-Treatment: PT  Co-Treatment Reason: co-treatment to  maximize patient/caregiver safety with OOB activity.  Prior to Session Communication: Bedside nurse  Patient Position Received:  (One siderail up, one siderail down.)  Preferred Learning Style: auditory, verbal, visual  General Comment: Patient reporting he has some baseline low back pain; however, pt reporting pain was intensifying at home and he was dreading getting up to go to the bathroom 2/2 pain. Pt. reporting that there is no specific position that relieves his pain; it is a different position each time that helps the pain relax.  Precautions:  Hearing/Visual Limitations: Pt. reporting he is legally blind. Able to see figures.  Precautions Comment: Right low back pain.     Pain:  Pain Assessment  Pain Assessment: 0-10  Pain Score: 2 (increasing with movement; pt did not rate level.)  Pain Type:  (acute on chronic)  Pain Location: Back  Pain Orientation: Lower, Right  Pain Descriptors: Sharp  Pain Frequency: Constant/continuous (increasing with movement)  Pain Onset: Ongoing  Pain Interventions: Repositioned  Response to Interventions: limited tolerance for movement due to pain. Pt. requesting pain medication from RN at end of session.    Objective   Cognition:  Overall Cognitive Status: Within Functional Limits  Orientation Level: Oriented X4 (v/c for exact date.)        Miller Agitation Sedation Scale  Miller Agitation Sedation Scale (RASS): Alert and calm  Home Living:  Type of Home: House  Lives With: Adult children (daughter and grandson)  Home Adaptive Equipment: Walker rolling or standard (WW)  Home Layout: Multi-level  Home Access: Stairs to enter with rails  Entrance Stairs-Rails: Both  Entrance Stairs-Number of Steps: 7  Bathroom Shower/Tub: Tub/shower unit (with tub transfer bench)  Bathroom Equipment: Tub transfer bench  Home Living Comments: Pt. using WW at baseline.   Prior Function:  Level of Luce: Needs assistance with ADLs (IND with WW)  Receives Help From: Family  ADL Assistance:  Needs assistance  Homemaking Assistance: Needs assistance  Ambulatory Assistance: Independent (with WW; reporting new increased difficulty.)  Prior Function Comments: Pt. IND with most ADLs, and functional mobility with use of WW. Pt. reporting family assists with household IADLs. Pt. does not drive 2/2 visual impairments.     ADL:  Eating Assistance: Modified independent (Device)  Eating Deficit: Setup, Verbal cueing (v/c for visual deficits)  Grooming Assistance: Minimal  Bathing Assistance: Moderate  UE Dressing Assistance: Moderate  LE Dressing Assistance: Total  Toileting Assistance with Device: Total       Bed Mobility/Transfers: Bed Mobility  Bed Mobility: Yes  Bed Mobility 1  Bed Mobility 1: Supine to sitting  Level of Assistance 1: Minimum assistance, Minimal verbal cues  Bed Mobility Comments 1: Assist with trunk to achieve sitting position at EOB.  Bed Mobility 2  Bed Mobility  2: Sitting to supine  Level of Assistance 2: Moderate assistance  Bed Mobility Comments 2: assist with LEs; cues for breathing techniques for pain management.    Transfers  Transfer: Yes  Transfer 1  Transfer From 1: Sit to, Stand to  Transfer to 1: Sit, Stand  Technique 1: Sit to stand, Stand to sit  Transfer Device 1: Walker  Transfer Level of Assistance 1: Minimum assistance, Minimal verbal cues  Trials/Comments 1: Cues for body positioning and sequencing for increased safety.  Ambulation/Gait Training:  Ambulation/Gait Training  Ambulation/Gait Training Performed: Yes  Ambulation/Gait Training 1  Comments/Distance (ft) 1: MOD A x2 for safety with side steps x3 to the R and forward/backward steps x4 with physical assist for WW management with increased UE support on WW 2/2 back pain. No LOB observed.  Sitting Balance:  Static Sitting Balance  Static Sitting-Comment/Number of Minutes: SBA for static sit balance.  Dynamic Sitting Balance  Dynamic Sitting-Comments: CGA for dynamic sit balance with LE movements.  Standing  Balance:  Static Standing Balance  Static Standing-Comment/Number of Minutes: MIN A x1  Dynamic Standing Balance  Dynamic Standing-Comments: MOD A x2 with increased pain and decreased safety.       Vision: Vision - Basic Assessment  Current Vision: Does not wear glasses  Visual History:  (Pt. reporting legally blind in B eyes with L eye worse than R eye. Pt. reporting he can see figures.)     Strength:  Strength Comments: RUE WFL; LUE assessment limited due to back pain with attempt to complete LUE shoulder flexion.        Hand Function:  Hand Function  Gross Grasp: Functional  Coordination: Functional  Extremities: RUE   RUE : Within Functional Limits and LUE   LUE:  (anticipate WFL however limited assessment 2/2 pain.)    Outcome Measures: [facility] Daily Activity  Putting on and taking off regular lower body clothing: Total  Bathing (including washing, rinsing, drying): A lot  Putting on and taking off regular upper body clothing: A lot  Toileting, which includes using toilet, bedpan or urinal: Total  Taking care of personal grooming such as brushing teeth: A little  Eating Meals: None  Daily Activity - Total Score: 13      Education Documentation  ADL Training, taught by Isabela Nunez OT at 1/24/2024  1:34 PM.  Learner: Patient  Readiness: Acceptance  Method: Explanation  Response: Verbalizes Understanding, Needs Reinforcement    Body Mechanics, taught by Isabela Nunez OT at 1/24/2024  1:34 PM.  Learner: Patient  Readiness: Acceptance  Method: Explanation  Response: Verbalizes Understanding, Needs Reinforcement    Precautions, taught by Isabela Nunez OT at 1/24/2024  1:34 PM.  Learner: Patient  Readiness: Acceptance  Method: Explanation  Response: Verbalizes Understanding, Needs Reinforcement    Education Comments  No comments found.      Goals:   Encounter Problems       Encounter Problems (Active)       ADLs       Patient will complete grooming with set up assist and min cues.   (Progressing)        Start:  01/24/24    Expected End:  02/07/24            Patient will complete UB dressing with set up assist and min cues.   (Progressing)       Start:  01/24/24    Expected End:  02/07/24            Patient will complete LB dressing with MIN assist and min cues.   (Progressing)       Start:  01/24/24    Expected End:  02/07/24            Patient will complete toileting with MIN assist and min cues.   (Progressing)       Start:  01/24/24    Expected End:  02/07/24                   MOBILITY       Patient will complete bed mobility with MOD I.  (Progressing)       Start:  01/24/24    Expected End:  02/07/24            Pt. Will demo household distance functional mobility with CGA using LRAD.   (Progressing)       Start:  01/24/24    Expected End:  02/07/24               TRANSFERS       Pt. Will complete stand pivot transfer with CGA with min cues and using LRAD.   (Progressing)       Start:  01/24/24    Expected End:  02/07/24                  Isabela Nunez, OT

## 2024-01-24 NOTE — ED TRIAGE NOTES
"Patient reports 10/10 back pain \"for quite some time\" he states that he has had the pain over 1 week he states that the pain travels over his right hip and down his leg   "

## 2024-01-24 NOTE — ED PROCEDURE NOTE
Procedure    Performed by: Elvin Cedillo MD  Authorized by: Elvin Cedillo MD        Genitourinary Indications: retention of urine    Musculoskeletal Indications: back pain      Procedure: Bladder Ultrasound    Findings:  Bladder Visualization: The bladder was visualized and was DISTENDED.     Impression:  Bladder: The bladder was DISTENDED.    Comments: PVR of ~150 ml. Bladder wall mildly thickened.               Elvin Cedillo MD  Resident  01/23/24 [id number]

## 2024-01-24 NOTE — PROGRESS NOTES
Physical Therapy    Physical Therapy Evaluation    Patient Name: Albert Schwartz  MRN: [id number]  Today's Date: 1/24/2024   Time Calculation  Start Time: 1150  Stop Time: 1213  Time Calculation (min): 23 min    Assessment/Plan   PT Assessment  PT Assessment Results: Decreased strength, Decreased endurance, Impaired balance, Decreased mobility, Pain  Rehab Prognosis: Good  End of Session Communication: Bedside nurse  Assessment Comment: 72yo male admitted with back pain presents with dec strength, balance, endurance and inc pain limiting functional mobility.  Pt would benefit from continued therapy to address these deficits and improve safety and indep.  End of Session Patient Position: Bed, 1 rail up, On cart  IP OR SWING BED PT PLAN  Inpatient or Swing Bed: Inpatient  PT Plan  Treatment/Interventions: Bed mobility, Transfer training, Gait training, Stair training, Balance training, Strengthening, Endurance training, Therapeutic exercise, Therapeutic activity, Positioning, Postural re-education  PT Plan: Skilled PT  PT Frequency: 3 times per week  PT Discharge Recommendations: Moderate intensity level of continued care  PT - OK to Discharge: Yes (PT eval complete & dc recs made)      Subjective   General Visit Information:  General  Reason for Referral: right lower back pain  Past Medical History Relevant to Rehab: BPH, bladder neck contracture status post dilation and Walsh placement and subsequent removal January 2024, ESRD, CHF, HTN, DVT/PE  Family/Caregiver Present: No  Co-Treatment: OT  Co-Treatment Reason: Pt seen with OT to maximize pt safety & therapeutic potential while facilitating a timely discharge.  Prior to Session Communication: Bedside nurse  Patient Position Received: On cart, Bed, 1 rail up  General Comment: Pt pleasant & cooperative, willing to participate in therapy assessment.  Home Living:  Home Living  Type of Home: House  Lives With:  (dtr & grandson)  Home Layout: Multi-level  Home Access:  Stairs to enter with rails  Entrance Stairs-Rails: Both  Entrance Stairs-Number of Steps: 7  Bathroom Shower/Tub: Tub/shower unit  Bathroom Equipment: Tub transfer bench  Prior Level of Function:  Prior Function Per Pt/Caregiver Report  Level of [place]: Needs assistance with ADLs (indep with amb with WW)  Receives Help From: Family  ADL Assistance: Needs assistance  Homemaking Assistance: Needs assistance  Ambulatory Assistance: Independent (with WW; inc difficulty recently)  Precautions:  Precautions  Hearing/Visual Limitations: legally blind; dec vision both eyes, L worse than R  Medical Precautions: Fall precautions  Precautions Comment: pt denies recent falls       Objective   Pain:  Pain Assessment  Pain Assessment: 0-10  Pain Score: 2 (much worse with mobility)  Pain Type: Acute pain  Pain Location: Back  Pain Orientation: Right  Pain Interventions: Repositioned  Cognition:  Cognition  Overall Cognitive Status: Within Functional Limits    General Assessments:     Activity Tolerance  Endurance: Decreased tolerance for upright activites (2/2 pain)    Sensation  Light Touch:  (grossly intact, but endorses tingling in toes)    Strength  Strength Comments: pain limited  Strength  Strength Comments: pain limited    Perception  Inattention/Neglect: Appears intact      Coordination  Movements are Fluid and Coordinated: Yes    Postural Control  Postural Control: Impaired (lists L 2/2 pain on R)    Static Sitting Balance  Static Sitting-Balance Support: Feet supported, Bilateral upper extremity supported  Static Sitting-Level of Assistance: Contact guard    Static Standing Balance  Static Standing-Balance Support: Bilateral upper extremity supported  Static Standing-Level of Assistance: Minimum assistance  Functional Assessments:  Bed Mobility  Bed Mobility: Yes  Bed Mobility 1  Bed Mobility 1: Supine to sitting  Level of Assistance 1: Minimum assistance, Minimal verbal cues  Bed Mobility Comments 1: assist at  trunk  Bed Mobility 2  Bed Mobility  2: Sitting to supine  Level of Assistance 2: Moderate assistance  Bed Mobility Comments 2: for LEs    Transfers  Transfer: Yes  Transfer 1  Technique 1: Sit to stand, Stand to sit  Transfer Device 1: Walker  Transfer Level of Assistance 1: Minimum assistance, Minimal verbal cues  Trials/Comments 1: cueing for hand placement and safety    Ambulation/Gait Training  Ambulation/Gait Training Performed: Yes  Ambulation/Gait Training 1  Surface 1: Level tile  Device 1: Rolling walker  Assistance 1: Moderate assistance (x2)  Quality of Gait 1: Decreased step length, Shuffling gait, Forward flexed posture, Antalgic  Comments/Distance (ft) 1: 3 steps laterally; 2' forward/back (gait is forward flexed, heavy reliance on UEs on AD, making moving WW difficult - requires PT assist to move; cueing for upright posture and safety)    Stairs  Stairs: No  Extremity/Trunk Assessments:  RLE   RLE :  (RLE - unable to fully assess AROM 2/2 inc pain with attempts)  LLE   LLE :  (appears grossly WFL)  Outcome Measures:  [facility] Basic Mobility  Turning from your back to your side while in a flat bed without using bedrails: A little  Moving from lying on your back to sitting on the side of a flat bed without using bedrails: A little  Moving to and from bed to chair (including a wheelchair): Total  Standing up from a chair using your arms (e.g. wheelchair or bedside chair): A lot  To walk in hospital room: Total  Climbing 3-5 steps with railing: Total  Basic Mobility - Total Score: 11    Encounter Problems       Encounter Problems (Active)       Mobility       STG - Patient will ambulate >100' with WW and CGA (Progressing)       Start:  01/24/24    Expected End:  02/07/24               Transfers       STG - Patient to transfer to and from sit to supine indep from flat bed without rails via logroll (Progressing)       Start:  01/24/24    Expected End:  02/07/24            STG - Patient will transfer sit to  and from stand CGA and WW (Progressing)       Start:  01/24/24    Expected End:  02/07/24                   Education Documentation  Precautions, taught by Malaika Cline, PT at 1/24/2024  1:31 PM.  Learner: Patient  Readiness: Acceptance  Method: Explanation  Response: Verbalizes Understanding, Needs Reinforcement  Comment: PT POC, dc recs, logrolling    Body Mechanics, taught by Malaika Cline, PT at 1/24/2024  1:31 PM.  Learner: Patient  Readiness: Acceptance  Method: Explanation  Response: Verbalizes Understanding, Needs Reinforcement  Comment: PT POC, dc recs, logrolling    Mobility Training, taught by Malaika Cline, PT at 1/24/2024  1:31 PM.  Learner: Patient  Readiness: Acceptance  Method: Explanation  Response: Verbalizes Understanding, Needs Reinforcement  Comment: PT POC, dc recs, logrolling    Education Comments  No comments found.        01/24/24 at 1:32 PM - Malaika Cline, PT

## 2024-01-24 NOTE — PROGRESS NOTES
"Zeke Schwartz is a 73 y.o. male on day 1 of ED admission.    Subjective   \"I haven't eaten since yesterday morning.\" \"My sciatica has thrown itself off the chart.\" Pt said he \"can't carry weight to go to the zeke.\" Pt said his aid is \"not coming like they are supposed to.\"    Objective   Pt is alert and oriented x3 today. He states his care is inconsistent at this time and deferred to talking with his daughter about contacts for his at home, waiver care program.     Assessment/Plan   Pt is expressing declined ability to ambulate due to his sciatica. He recalled lack of consistent staffing of his in-home aid for 6 hours a day six days a week since November. Pt said his daughter is \"filling in\" with support of his care. Pt receptive if SNF is recommended and recalled past rehab in [place]. Daughter can confirm facility.   Rehab requested via secure chat to evaluate for SNF.    Spoke with daughter Berta @10:10am. She confirmed [facility] is Pt's [place]  [phone]. Berta said her dad sent his last aid home bc she was late. \"I've been filling in with his needs.\" \"He is fine\" \"He just had surgery on the 12th.\" Berta said her sister Lisa is on her way to be bedside in ED. Berta said, \"Everything is ten times worse than what it is.\" \"He goes into overdrove over everything.\" She did state, however, his \"sciatica pain is real.\" \"It's limiting him.\" \"I think he would get about more freely and more frequent if he didn't have the pain.\"She said he is taking Tylenol and not been prescribed anything else for it. Regarding his aid hours- Pt has reportedly not had consistency with his staffing and she has had probably 20 agencies in the time he has had private duty care.    Was at [facility] in the past in [place]. Daughter said he was sent home early for not participating in his care. Daughter prefers a location in [place].  Spoke with Pt bedside. Discussed choices with him. Due to vision impairment, Pt unable to " read choice list. SW discussed with him daughter's suggestion of [place] location for [facility]. Pt explained his only choice is that of the location in [place] he went to in the past- was [facility] now goes by [facility].   SW sent referral to [facility] pre Pt's request whilst awaiting PT eval.   Pt provided phone and was observed calling daughter Berta to discuss POC.   Facility accepted and is requesting updated notes. Collaborated with Provider  and she agreed to admit Pt.  @2:40pm OT, PT, and H and P sent. PASRR needed for facility to submit for cert.   MARCIAW Mayela to complete PASRR so facility can begin cert.     NILE Gracia

## 2024-01-24 NOTE — H&P
"History Of Present Illness  Albert Schwartz is a 73 y.o. male presenting with low back pain. PMH T2DM (A1c 6.8% 12/2022), HTN, cirrhosis 2/2 alcohol use and chronic hep C, SLE, ESRD 2/2 htn and T2DM now s/p kidney transplant in 2017  on tacrolimus and MMF, BPH s/p HoLEP in 1/2023 which was c/b PE now on xarelto s/p IVC filter placement and removal, GERD and HLD.    Patient presented to the ER on 1/23/24 with lower back pain. The pain began 2-3 days prior to presentation, located in the right lower back with radiation down the right leg. Not associated with numbness or burning. Has felt this pain before and is similar to his \"sciatica pain\". Denies any recent falls or other trauma.     Patient also presented with concerns of inability to urinate. He has a history of BPH s/p HoLEP 1/20/23 and bladder neck dilation on 1/11.      Orthopedic service evaluated the patient. MRI demonstrated L3/4 and L4/5 DDD and mild foraminal stenosis. Recommending PT and OT evaluation. PT and OT assessment demonstrating decreased strength and impaired balance, recommending SNF placement.    Past Medical History  Past Medical History:   Diagnosis Date    Alcohol abuse, in remission     Arthritis     Bladder neck contracture     Scheduled for surgery with Dr. Gonzales on 12/15/23    Blindness     BPH (benign prostatic hyperplasia)     Chronic diastolic (congestive) heart failure (CMS/HCC) 01/18/2018    Chronic diastolic congestive heart failure    Chronic hepatitis C (CMS/HCC)     Cirrhosis (CMS/HCC)     2/2 ETOH use    Diabetes mellitus (CMS/HCC)     Disorder of male genital organs, unspecified 12/11/2018    Testicular abnormality    ESRD (end stage renal disease) (CMS/[facility])     S/P renal transplant in 2017, F/W Nephrology Dr. Liu, LOV 10/16/2023    GERD (gastroesophageal reflux disease)     Hyperlipidemia     Hypertension     Immunodeficiency, unspecified (CMS/[facility]) 01/18/2018    Immunosuppression    Kidney transplant status     Other " ascites     Ascites    Other specified personal risk factors, not elsewhere classified 01/18/2018    At risk for infection transmitted from donor    PE (pulmonary thromboembolism) (CMS/HCC)     on Eliquis, F/W cards    Peripheral vascular disease, unspecified (CMS/HCC) 01/18/2018    PAD (peripheral artery disease)       Surgical History  Past Surgical History:   Procedure Laterality Date    CATARACT EXTRACTION  01/18/2018    Cataract Extraction    CYSTOSCOPY      ESOPHAGOGASTRODUODENOSCOPY      FL GUIDED ABDOMINAL PARACENTESIS  03/30/2015    FL GUIDED ABDOMINAL PARACENTESIS 3/30/2015 [facility] AIB LEGACY    FL GUIDED ABDOMINAL PARACENTESIS  04/14/2015    FL GUIDED ABDOMINAL PARACENTESIS 4/14/2015 [facility] INPATIENT LEGACY    FL GUIDED ABDOMINAL PARACENTESIS  08/28/2015    FL GUIDED ABDOMINAL PARACENTESIS 8/28/2015 [facility] AIB LEGACY    KIDNEY SURGERY  06/02/2020    Kidney Surgery    OTHER SURGICAL HISTORY  08/18/2014    Brain Surgery    OTHER SURGICAL HISTORY  06/30/2020    Renal Transplant    OTHER SURGICAL HISTORY  06/02/2020    Incisional Hernia Repair    SPLENECTOMY, TOTAL  06/02/2020    Splenectomy    US GUIDED ABDOMINAL PARACENTESIS  03/20/2014    US GUIDED ABDOMINAL PARACENTESIS 3/20/2014 [facility] AIB LEGACY    US GUIDED ABDOMINAL PARACENTESIS  08/14/2014    US GUIDED ABDOMINAL PARACENTESIS 8/14/2014 [facility] AIB LEGACY    US GUIDED ABDOMINAL PARACENTESIS  11/17/2014    US GUIDED ABDOMINAL PARACENTESIS 11/17/2014 [facility] AIB LEGACY    US GUIDED ABDOMINAL PARACENTESIS  12/11/2014    US GUIDED ABDOMINAL PARACENTESIS 12/11/2014 [facility] CLINICAL LEGACY    US GUIDED ABDOMINAL PARACENTESIS  01/27/2015    US GUIDED ABDOMINAL PARACENTESIS 1/27/2015 [facility] ANCILLARY LEGACY    US GUIDED ABDOMINAL PARACENTESIS  02/19/2015    US GUIDED ABDOMINAL PARACENTESIS 2/19/2015 [facility] AIB LEGACY    US GUIDED ABDOMINAL PARACENTESIS  03/18/2015    US GUIDED ABDOMINAL PARACENTESIS 3/18/2015 [facility] AIB LEGACY    US GUIDED ABDOMINAL PARACENTESIS  04/17/2015    US GUIDED  ABDOMINAL PARACENTESIS 4/17/2015 [facility] INPATIENT LEGACY    US GUIDED ABDOMINAL PARACENTESIS  07/14/2015    US GUIDED ABDOMINAL PARACENTESIS 7/14/2015 [facility] AIB LEGACY    US GUIDED ABDOMINAL PARACENTESIS  10/12/2015    US GUIDED ABDOMINAL PARACENTESIS 10/12/2015 [facility] CLINICAL LEGACY    US GUIDED ABDOMINAL PARACENTESIS  10/19/2015    US GUIDED ABDOMINAL PARACENTESIS 10/19/2015 [facility] CLINICAL LEGACY        Social History  He reports that he quit smoking about 24 years ago. His smoking use included cigarettes. He has a 7.50 pack-year smoking history. He has never used smokeless tobacco. He reports that he does not currently use alcohol. He reports that he does not use drugs.    Family History  Family History   Problem Relation Name Age of Onset    Colon cancer Sister      Heart disease Brother          Allergies  Penicillins, Ace inhibitors, Arb-angiotensin receptor antagonist, Influenza tri-split 2007 vac, Oxycodone-acetaminophen, Penicillin, and Ceftriaxone    Review of Systems   Constitutional:  Negative for chills, diaphoresis and fever.   HENT:  Negative for facial swelling, hearing loss and sore throat.    Eyes:  Negative for visual disturbance.   Respiratory:  Negative for apnea, cough, chest tightness, shortness of breath and wheezing.    Cardiovascular:  Negative for chest pain, palpitations and leg swelling.   Gastrointestinal:  Negative for abdominal distention, abdominal pain, blood in stool, constipation, diarrhea, nausea and vomiting.   Genitourinary:  Negative for decreased urine volume, difficulty urinating, dysuria, flank pain, frequency, hematuria and urgency.   Musculoskeletal:  Positive for back pain and gait problem. Negative for arthralgias, neck pain and neck stiffness.        Pain in right leg   Skin:  Negative for pallor, rash and wound.   Neurological:  Positive for weakness and numbness (In bilateral toes). Negative for dizziness, speech difficulty, light-headedness and headaches.    Psychiatric/Behavioral:  Negative for behavioral problems and suicidal ideas. The patient is not nervous/anxious and is not hyperactive.         Physical Exam  Vitals reviewed.   Constitutional:       General: He is not in acute distress.  HENT:      Mouth/Throat:      Mouth: Mucous membranes are moist.      Pharynx: Oropharynx is clear.   Eyes:      Extraocular Movements: Extraocular movements intact.      Conjunctiva/sclera: Conjunctivae normal.      Pupils: Pupils are equal, round, and reactive to light.   Cardiovascular:      Rate and Rhythm: Normal rate and regular rhythm.      Pulses: Normal pulses.      Heart sounds: Normal heart sounds. No murmur heard.     No gallop.   Pulmonary:      Effort: Pulmonary effort is normal.      Breath sounds: Normal breath sounds. No wheezing, rhonchi or rales.   Abdominal:      General: Abdomen is flat. Bowel sounds are normal. There is no distension.      Palpations: Abdomen is soft. There is no mass.      Tenderness: There is no abdominal tenderness. There is no rebound.   Musculoskeletal:         General: No swelling.      Cervical back: Normal range of motion and neck supple.      Right upper leg: Tenderness present.      Left upper leg: Normal.      Right knee: Normal.      Left knee: Normal.   Skin:     General: Skin is warm and dry.      Capillary Refill: Capillary refill takes less than 2 seconds.   Neurological:      General: No focal deficit present.      Mental Status: He is alert and oriented to person, place, and time. Mental status is at baseline.      Sensory: No sensory deficit.      Motor: Weakness present.      Gait: Gait abnormal.      Comments: Weakness and pain on hip flexion of the right thigh   Psychiatric:         Mood and Affect: Mood normal.         Behavior: Behavior normal.         Thought Content: Thought content normal.         Judgment: Judgment normal.          Last Recorded Vitals  Blood pressure 128/75, pulse 86, resp. rate 20, SpO2 98  %.    Relevant Results  Results for orders placed or performed during the hospital encounter of 01/23/24 (from the past 24 hour(s))   CBC and Auto Differential   Result Value Ref Range    WBC 6.9 4.4 - 11.3 x10*3/uL    nRBC 0.0 0.0 - 0.0 /100 WBCs    RBC 4.12 (L) 4.50 - 5.90 x10*6/uL    Hemoglobin 10.6 (L) 13.5 - 17.5 g/dL    Hematocrit 30.2 (L) 41.0 - 52.0 %    MCV 73 (L) 80 - 100 fL    MCH 25.7 (L) 26.0 - 34.0 pg    MCHC 35.1 32.0 - 36.0 g/dL    RDW 17.6 (H) 11.5 - 14.5 %    Platelets 416 150 - 450 x10*3/uL    Neutrophils % 51.3 40.0 - 80.0 %    Immature Granulocytes %, Automated 0.4 0.0 - 0.9 %    Lymphocytes % 36.7 13.0 - 44.0 %    Monocytes % 9.5 2.0 - 10.0 %    Eosinophils % 1.4 0.0 - 6.0 %    Basophils % 0.7 0.0 - 2.0 %    Neutrophils Absolute 3.55 1.60 - 5.50 x10*3/uL    Immature Granulocytes Absolute, Automated 0.03 0.00 - 0.50 x10*3/uL    Lymphocytes Absolute 2.55 0.80 - 3.00 x10*3/uL    Monocytes Absolute 0.66 0.05 - 0.80 x10*3/uL    Eosinophils Absolute 0.10 0.00 - 0.40 x10*3/uL    Basophils Absolute 0.05 0.00 - 0.10 x10*3/uL   Comprehensive metabolic panel   Result Value Ref Range    Glucose 274 (H) 74 - 99 mg/dL    Sodium 137 136 - 145 mmol/L    Potassium 3.8 3.5 - 5.3 mmol/L    Chloride 106 98 - 107 mmol/L    Bicarbonate 19 (L) 21 - 32 mmol/L    Anion Gap 16 10 - 20 mmol/L    Urea Nitrogen 18 6 - 23 mg/dL    Creatinine 1.04 0.50 - 1.30 mg/dL    eGFR 76 >60 mL/min/1.73m*2    Calcium 9.7 8.6 - 10.6 mg/dL    Albumin 3.9 3.4 - 5.0 g/dL    Alkaline Phosphatase 42 33 - 136 U/L    Total Protein 7.8 6.4 - 8.2 g/dL    AST 17 9 - 39 U/L    Bilirubin, Total 0.6 0.0 - 1.2 mg/dL    ALT 8 (L) 10 - 52 U/L   Tacrolimus level   Result Value Ref Range    Tacrolimus  11.0 <=15.0 ng/mL   Urinalysis with Reflex Culture and Microscopic   Result Value Ref Range    Color, Urine Yellow Straw, Yellow    Appearance, Urine Hazy (N) Clear    Specific Gravity, Urine 1.019 1.005 - 1.035    pH, Urine 6.0 5.0, 5.5, 6.0, 6.5, 7.0,  7.5, 8.0    Protein, Urine 100 (2+) (N) NEGATIVE mg/dL    Glucose, Urine 50 (1+) (A) NEGATIVE mg/dL    Blood, Urine LARGE (3+) (A) NEGATIVE    Ketones, Urine NEGATIVE NEGATIVE mg/dL    Bilirubin, Urine NEGATIVE NEGATIVE    Urobilinogen, Urine <2.0 <2.0 mg/dL    Nitrite, Urine NEGATIVE NEGATIVE    Leukocyte Esterase, Urine MODERATE (2+) (A) NEGATIVE   Microscopic Only, Urine   Result Value Ref Range    WBC, Urine >50 (A) 1-5, NONE /HPF    RBC, Urine >20 (A) NONE, 1-2, 3-5 /HPF    Mucus, Urine 1+ Reference range not established. /LPF   Urine Gray Tube   Result Value Ref Range    Extra Tube Hold for add-ons.      MR lumbar spine wo IV contrast    Result Date: 1/24/2024  Interpreted By:  Curry Franco,  and Yvonne Montelongo STUDY: MR LUMBAR SPINE WO IV CONTRAST;  1/24/2024 12:25 am   INDICATION: Signs/Symptoms:back pain.   COMPARISON: MRI of the lumbar spine on 08/31/2020   ACCESSION NUMBER(S): [id number]   ORDERING CLINICIAN: KELLY JOHNSON   TECHNIQUE: Sagittal T1, T2, STIR, axial T1 and T2 weighted images of the lumbar spine were acquired.   FINDINGS: Alignment: There is straightening of lumbar lordosis and minimal degenerative retrolisthesis of L3 over L4.   Vertebrae/Intervertebral Discs: The vertebral bodies demonstrate expected height.There is a STIR hyperintense Schmorl node at the inferior endplate of L3. Otherwise, the marrow signal is within normal limits. There is diffuse disc desiccation with mild degenerative disc space narrowing at L3-L4 and L4-L5.   There is congenital narrowing of the spinal canal from L2 through L5 due to short pedicles, similar to prior. Prominent dorsal and ventral epidural fat at L3-L5.   Conus: The lower thoracic cord appears unremarkable. The conus terminates at T12-L1.   T12-L1:  There is no significant central canal or neural foraminal stenosis.   L1-2: Mild disc bulge asymmetric to the right, bilateral facet hypertrophy and ligamentum flavum thickening, without significant central  canal or foraminal stenosis.   L2-3: Diffuse disc bulge, along with bilateral facet and ligamentum flavum hypertrophy, with posterior osteophytic spurring leads to minimal central canal stenosis at this level. Mild bilateral foraminal stenosis. Small bilateral facet joint effusions.   L3-4: Diffuse disc bulge, along with central disc osteophyte complex, which along with bilateral facet and ligamentum flavum hypertrophy and epidural fat leads to mild central canal stenosis and mild to moderate bilateral neural foraminal stenosis.   L4-5: Diffuse disc bulge, facet and ligamentum flavum hypertrophy bilaterally causes mild central spinal canal stenosis at this level. There is mild-to-moderate bilateral foraminal stenosis due to facet arthropathy. Small bilateral facet joint effusions.   L5-S1: Mild diffuse disc bulge, facet and ligamentum flavum hypertrophy and posterior osteophytic spurring without significant central spinal canal stenosis. Mild bilateral foraminal narrowing.   Mild fatty atrophy of the bilateral lower paravertebral muscles at the levels of L4 through S1 similar to prior.   Multiple T2 hyperintense lesions within the right-greater-than-left kidneys, consistent with simple cysts. There is bilateral renal atrophy. There is also a partially visualized transplant kidney in the right lower quadrant.       1. Slightly small developmentally lumbar spinal canal. There is minimal progression of disc degeneration at L3-L4 and L4-L5 with mild central canal stenosis and mild-to-moderate bilateral foraminal stenosis at these levels. 2. No severe spinal canal or neural foraminal stenosis.   I personally reviewed the images/study and I agree with the findings as stated. This study was interpreted at [facility], [place], [place].   MACRO: None   Signed by: Curry Franco 1/24/2024 4:18 AM Dictation workstation:   [id number]    Point of Care Ultrasound    Result Date:  1/23/2024  Elvni Cedillo MD     1/23/2024 10:27 PM Performed by: Elvin Cedillo MD Authorized by: Elvin Cedillo MD  Genitourinary Indications: retention of urine Musculoskeletal Indications: back pain Procedure: Bladder Ultrasound Findings: Bladder Visualization: The bladder was visualized and was DISTENDED. Impression: Bladder: The bladder was DISTENDED. Comments: PVR of ~150 ml. Bladder wall mildly thickened.        Assessment/Plan   Active Problems:  There are no active Hospital Problems.  73M PMH T2DM (A1c 6.8% 12/2022), HTN, cirrhosis 2/2 alcohol use and chronic hep C, SLE, ESRD 2/2 htn and T2DM now s/p kidney transplant in 2017  on tacrolimus and MMF, BPH s/p HoLEP in 1/2023 which was c/b PE now on xarelto s/p IVC filter placement and removal, GERD and HLD who presented with LBP. Evaluation via MRI demonstrating DDD and mild canal stenosis. PT/OT evaluation demonstrating reduced strength, endurance, and balance. Recommending SNF placement. Admitted for placement    #DDD L3/4, L4/5  #Mild foraminal stenosis  :: 2-3d h/o acute onset back pain  :: Accepted to SNF for acute rehab  -Lidocaine patch    #UTI  :: UA positive for LE and large pyuria  :: No urinary symptoms currently  :: Will treat empirically iso immunosuppression  -Cefepime  [  ] F/U UCx    #HTN  #HLD  -Amlodipine 2.5mg daily  -Atorvastatin 20mg daily    #T2DM  :: A1c 6.8% 12/2022  :: Home insulin regimen: Tresiba 30u AM, 10u PM, Lispro 10u TID  [  ] F/U A1c  -Accucheck ACHS  - LDISS    #H/o PE  -On lifelong Xarelto therapy    #Liver Cirrhosis 2/2 chronic HCV infx  :: HCV treated historically with harvoni followed by relapse followed by treatment with Vocevi which cleared infection [id number]  :: MRI of liver demonstrating signs of liver cirrhosis as well as simple cysts  :: Liver function has stabilized since renal transplant in 2017  -Currently not in evaluation for liver transplant   -Monitor for signs of liver injury  -Avoid hepatotoxic  medications    #ESRD s/p kidney transplant  #H/o kidney transplant 2017  -Renal  transplant service consulted  -Daily tacrolimus levels  -Tacrolimus 1mg BID  - MMF 500mg BID    #BPH  ::S/p HoLEP  -Monitor for urinary retention    F: PRN  E: PRN  N: Carb controlled diet  A: PIVx1  DVT: Xarelto    Code Status: Full code (confirmed on admission)  NOK: Sabreen Cooks, daughter ([phone])    Yong Efren [phone]                Cabrera Turner MD

## 2024-01-24 NOTE — ED PROVIDER NOTES
Emergency Department Encounter  [facility] EMERGENCY MEDICINE    Patient: Albert Schwartz  MRN: [id number]  : 1950  Date of Evaluation: 2024  ED Provider: YESSY Hargrove      Chief Complaint       Chief Complaint   Patient presents with    Back Pain        Limitations to History: none  Historian: patient  Records reviewed: EMR inpatient and outpatient notes, Care Everywhere    This is a 73-year-old male with a PMH of BPH, bladder neck contracture status post dilation and Walsh placement and subsequent removal 2024, ESRD, CHF, HTN, DVT/PE who presents to the emergency department with right lower back pain.  Patient states he developed pain 2 to 3 days ago.  Patient states the pain feels like his sciatica pain.  Pain radiates down his right leg.  Denies any numbness or tingling.  Denies any recent fall or trauma.  Denies taking any pain medications prior to arrival.  Patient describes the pain as a sharp, constant pain rating it a 10 out of 10.  Patient states that he has not voided in several hours and thinks the last time he voided was earlier today.    PMH: BPH, bladder neck contracture s/p dilation, sciatica, ESRD, CHF, HTN, HLD, DVT/PE  PSH: Renal transplant  Allergies: Reviewed per EMR  Social HX: Denies smoking, alcohol or drug use.  Family HX: No family history pertinent to current presenting problem  Medications: Reviewed per EMR    ROS:     Review of Systems   Musculoskeletal:  Positive for back pain.     14 systems reviewed and otherwise acutely negative except as in the [place].        Past History     Past Medical History:   Diagnosis Date    Alcohol abuse, in remission     Arthritis     Bladder neck contracture     Scheduled for surgery with Dr. Gonzales on 12/15/23    Blindness     BPH (benign prostatic hyperplasia)     Chronic diastolic (congestive) heart failure (CMS/HCC) 2018    Chronic diastolic congestive heart failure    Chronic hepatitis C (CMS/HCC)      Cirrhosis (CMS/[facility])     2/2 ETOH use    Diabetes mellitus (CMS/[facility])     Disorder of male genital organs, unspecified 12/11/2018    Testicular abnormality    ESRD (end stage renal disease) (CMS/[facility])     S/P renal transplant in 2017, F/W Nephrology Dr. Liu, LOV 10/16/2023    GERD (gastroesophageal reflux disease)     Hyperlipidemia     Hypertension     Immunodeficiency, unspecified (CMS/[facility]) 01/18/2018    Immunosuppression    Kidney transplant status     Other ascites     Ascites    Other specified personal risk factors, not elsewhere classified 01/18/2018    At risk for infection transmitted from donor    PE (pulmonary thromboembolism) (CMS/[facility])     on Eliquis, F/W cards    Peripheral vascular disease, unspecified (CMS/[facility]) 01/18/2018    PAD (peripheral artery disease)     Past Surgical History:   Procedure Laterality Date    CATARACT EXTRACTION  01/18/2018    Cataract Extraction    CYSTOSCOPY      ESOPHAGOGASTRODUODENOSCOPY      FL GUIDED ABDOMINAL PARACENTESIS  03/30/2015    FL GUIDED ABDOMINAL PARACENTESIS 3/30/2015 [facility] [place]GAC[place]    FL GUIDED ABDOMINAL PARACENTESIS  04/14/2015    FL GUIDED ABDOMINAL PARACENTESIS 4/14/2015 [facility] [place] INPATIENT LEGACY    FL GUIDED ABDOMINAL PARACENTESIS  08/28/2015    FL GUIDED ABDOMINAL PARACENTESIS 8/28/2015 [facility] [place] AIB LEGACY    KIDNEY SURGERY  06/02/2020    Kidney Surgery    OTHER SURGICAL HISTORY  08/18/2014    Brain Surgery    OTHER SURGICAL HISTORY  06/30/2020    Renal Transplant    OTHER SURGICAL HISTORY  06/02/2020    Incisional Hernia Repair    SPLENECTOMY, TOTAL  06/02/2020    Splenectomy    US GUIDED ABDOMINAL PARACENTESIS  03/20/2014    US GUIDED ABDOMINAL PARACENTESIS 3/20/2014 [facility] [place]IB LEGACY    US GUIDED ABDOMINAL PARACENTESIS  08/14/2014    US GUIDED ABDOMINAL PARACENTESIS 8/14/2014 [facility] [place]IB LEGACY    US GUIDED ABDOMINAL PARACENTESIS  11/17/2014    US GUIDED ABDOMINAL PARACENTESIS 11/17/2014 [facility] [place] AIB LEGACY    US GUIDED ABDOMINAL PARACENTESIS  12/11/2014    US GUIDED  ABDOMINAL PARACENTESIS 12/11/2014 [facility] CLINICAL LEGACY    US GUIDED ABDOMINAL PARACENTESIS  01/27/2015    [place] GUIDED ABDOMINAL PARACENTESIS 1[place] [facility] [place] [facility] [place]    US GUIDED ABDOMINAL PARACENTESIS  02/19/2015    [place] GUIDED ABDOMINAL PARACENTESIS 2/19/2015 [facility] [place] [facility] [place]    US GUIDED ABDOMINAL PARACENTESIS  03/18/2015    [place] GUIDED ABDOMINAL PARACENTESIS 3/18/2015 [facility] [place] [facility] [place]    US GUIDED ABDOMINAL PARACENTESIS  04/17/2015    [place] GUIDED ABDOMINAL PARACENTESIS 4[place]17/2015 [facility] [place] [facility] [place]    US GUIDED ABDOMINAL PARACENTESIS  07/14/2015    [place] GUIDED ABDOMINAL PARACENTESIS 7/14/2015 [facility] [place] [facility] [place]    US GUIDED ABDOMINAL PARACENTESIS  10/12/2015    US GUIDED ABDOMINAL PARACENTESIS 10/12/2015 [facility] CLINICAL LEGACY    US GUIDED ABDOMINAL PARACENTESIS  10/19/2015    US GUIDED ABDOMINAL PARACENTESIS 10/19/2015 [facility] CLINICAL LEGACY         Medications/Allergies     Previous Medications    ACETAMINOPHEN (TYLENOL) 325 MG TABLET        AMLODIPINE (NORVASC) 2.5 MG TABLET    TAKE ONE (1) TABLET BY MOUTH ONCE DAILY.    ARTIFICIAL TEARS, DEXTRAN-HYPOMEL-GLYCERIN, 0.1-0.3-0.2 % OPHTHALMIC SOLUTION    Administer 2 drops into both eyes every 4 hours if needed for dry eyes.    ATORVASTATIN (LIPITOR) 20 MG TABLET    Take 1 tablet (20 mg) by mouth once daily.    BLOOD SUGAR DIAGNOSTIC (ACCU-CHEK HECTOR PLUS TEST STRP) STRIP    Use as instructed TWICE DAILY FOR TESTING FOR nON INSULIN DEPENDENT DIABETES E11.319    CHOLECALCIFEROL (VITAMIN D-3) 1,250 MCG (50,000 UNIT) CAPSULE    Take 1 capsule (50,000 Units) by mouth once a week.    INSULIN DEGLUDEC (TRESIBA FLEXTOUCH) 100 UNIT/ML (3 ML) INJECTION    Inject 30 Units under the skin once daily in the morning. And 10 units in the PM    INSULIN LISPRO (HUMALOG) 100 UNIT/ML INJECTION    Inject 0.1 mL (10 Units) under the skin once daily at bedtime.    METOPROLOL SUCCINATE XL (TOPROL-XL) 50 MG 24 HR TABLET    Take 1 tablet (50 mg) by mouth once daily.    MYCOPHENOLATE (CELLCEPT) 250 MG  "CAPSULE    TAKE TWO (2) CAPSULES BY MOUTH TWICE DAILY.    ONETOUCH ULTRASOFT LANCETS MISC        POLYETHYLENE GLYCOL (GLYCOLAX, MIRALAX) 17 GRAM/DOSE POWDER    Take by mouth.  as directed    PREDNISOLONE ACETATE (PRED-FORTE) 1 % OPHTHALMIC SUSPENSION    Administer 1 drop into the left eye 4 times a day. use until your next eye exam    STOOL SOFTENER-LAXATIVE 8.6-50 MG TABLET        TACROLIMUS (PROGRAF) 0.5 MG CAPSULE    TAKE TWO (2) CAPSULES BY MOUTH EVERY 12 HOURS.    TACROLIMUS (PROGRAF) 1 MG CAPSULE    Take 1 capsule (1 mg) by mouth twice a day.    TADALAFIL 20 MG TABLET    Take 1 tablet (20 mg) by mouth once daily as needed for erectile dysfunction (1 HOUR BEFORE NEEDED).    TRIAMCINOLONE (KENALOG) 0.1 % CREAM    Apply 1 Application topically. to affected area 2-3 times a day    XARELTO 20 MG TABLET    Take 1 tablet (20 mg) by mouth once daily in the evening. Take with meals.     Allergies   Allergen Reactions    Penicillins Anaphylaxis, Hives, Shortness of breath and Swelling     swollen tongue    Ace Inhibitors Other     ESRD    Arb-Angiotensin Receptor Antagonist Other     ESRD    Influenza Tri-Split 2007 Vac Other    Oxycodone-Acetaminophen Other     \"ZONED OUT\"    Penicillin Swelling    Ceftriaxone Itching and Rash     pt endorces itching to face and abd        Physical Exam       ED Triage Vitals   Temp Pulse Resp BP   -- -- -- --      SpO2 Temp src Heart Rate Source Patient Position   -- -- -- --      BP Location FiO2 (%)     -- --       Physical Exam:    Appearance: Alert, oriented , cooperative,  in no acute distress. Well nourished & well hydrated.    Skin: Intact,  dry skin, no lesions, rash, petechiae or purpura.     Eyes: PERRLA, EOMs intact,  Conjunctiva pink with no redness or exudates. Cornea & anterior chamber are clear, Eyelids without lesions. No scleral icterus.     ENT: Hearing grossly intact. External auditory canals patent, tympanic membranes intact with visible landmarks. Nares patent, " mucus membranes moist. Dentition without lesions. Pharynx clear, uvula midline.     Neck: Supple, without meningismus.     Pulmonary: Clear bilaterally with good chest wall excursion. No rales, rhonchi or wheezing. No accessory muscle use or stridor.    Cardiac: Normal S1, S2 without murmur, rub, gallop or extrasystole. No JVD, Carotids without bruits.    Abdomen: Soft, nontender, active bowel sounds.  No palpable organomegaly.  No rebound or guarding.      Musculoskeletal: Full range of motion. No deformity. Pulses full and equal. No cyanosis, clubbing, or edema. No midline spinal tenderness. + Right lumbar paraspinal tenderness.    Neurological:  Normal sensation, no weakness, no focal findings identified.    Psychiatric: Appropriate mood and affect.       Diagnostics   Labs:  Results for orders placed or performed during the hospital encounter of 01/23/24 (from the past 24 hour(s))   CBC and Auto Differential   Result Value Ref Range    WBC 6.9 4.4 - 11.3 x10*3/uL    nRBC 0.0 0.0 - 0.0 /100 WBCs    RBC 4.12 (L) 4.50 - 5.90 x10*6/uL    Hemoglobin 10.6 (L) 13.5 - 17.5 g/dL    Hematocrit 30.2 (L) 41.0 - 52.0 %    MCV 73 (L) 80 - 100 fL    MCH 25.7 (L) 26.0 - 34.0 pg    MCHC 35.1 32.0 - 36.0 g/dL    RDW 17.6 (H) 11.5 - 14.5 %    Platelets 416 150 - 450 x10*3/uL    Neutrophils % 51.3 40.0 - 80.0 %    Immature Granulocytes %, Automated 0.4 0.0 - 0.9 %    Lymphocytes % 36.7 13.0 - 44.0 %    Monocytes % 9.5 2.0 - 10.0 %    Eosinophils % 1.4 0.0 - 6.0 %    Basophils % 0.7 0.0 - 2.0 %    Neutrophils Absolute 3.55 1.60 - 5.50 x10*3/uL    Immature Granulocytes Absolute, Automated 0.03 0.00 - 0.50 x10*3/uL    Lymphocytes Absolute 2.55 0.80 - 3.00 x10*3/uL    Monocytes Absolute 0.66 0.05 - 0.80 x10*3/uL    Eosinophils Absolute 0.10 0.00 - 0.40 x10*3/uL    Basophils Absolute 0.05 0.00 - 0.10 x10*3/uL   Comprehensive metabolic panel   Result Value Ref Range    Glucose 274 (H) 74 - 99 mg/dL    Sodium 137 136 - 145 mmol/L     Potassium 3.8 3.5 - 5.3 mmol/L    Chloride 106 98 - 107 mmol/L    Bicarbonate 19 (L) 21 - 32 mmol/L    Anion Gap 16 10 - 20 mmol/L    Urea Nitrogen 18 6 - 23 mg/dL    Creatinine 1.04 0.50 - 1.30 mg/dL    eGFR 76 >60 mL/min/1.73m*2    Calcium 9.7 8.6 - 10.6 mg/dL    Albumin 3.9 3.4 - 5.0 g/dL    Alkaline Phosphatase 42 33 - 136 U/L    Total Protein 7.8 6.4 - 8.2 g/dL    AST 17 9 - 39 U/L    Bilirubin, Total 0.6 0.0 - 1.2 mg/dL    ALT 8 (L) 10 - 52 U/L      Radiographs:  MR lumbar spine wo IV contrast         Point of Care Ultrasound                 Assessment   In brief, Albert Schwartz is a 73 y.o. male who presented to the emergency department with back pain.          ED Course/MDM   Visit Vitals  Smoking Status Former       Medications   lidocaine 4 % patch 1 patch (1 patch transdermal Medication Applied 1/23/24 2134)   acetaminophen (Tylenol) tablet 650 mg (650 mg oral Given 1/23/24 2134)   cyclobenzaprine (Flexeril) tablet 5 mg (5 mg oral Given 1/23/24 2134)   HYDROmorphone (Dilaudid) injection 0.5 mg (0.5 mg intravenous Given 1/23/24 2354)       Patient remained stable while in the emergency department. Previous outpatient and ED records were reviewed. Outside records were reviewed.  Patient remained stable in the emergency room.  Patient received Tylenol 650 mg p.o. once, Flexeril 5 mg p.o., Dilaudid 0.5 mg IV once and a lidocaine patch for pain.  Patient had right paraspinal tenderness.  Bladder scan was obtained in the emergency room which showed minimal urine in the bladder.  IV was established and labs obtained.  CBC was unremarkable.  Comprehensive metabolic panel with a glucose of 274 and bicarbonate of 19, otherwise unremarkable.  Pending urinalysis and reevaluation at this time.    Final Impression    No diagnosis found.      DISPOSITION  Disposition: Sign out to Cinthia manzano CNP.    Comment: Please note this report has been produced using speech recognition software and may contain errors related to  that system including errors in grammar, punctuation, and spelling, as well as words and phrases that may be inappropriate.  If there are any questions or concerns please feel free to contact the dictating provider for clarification.    YESSY Hargrove APRN-CNP  01/24/24 0113     Attending Note:  The patient was seen by the resident/fellow/BLADE.  I have personally performed a substantive portion of the encounter.  I have seen and examined the patient; agree with the workup, evaluation, MDM, management and diagnosis with the exception/addition of the following.  The care plan has been discussed with the resident/fellow; I have reviewed the resident/fellow’s note and agree with the documented findings with the exception/addition of the following:       HPI:   Albert Schwartz is a 73 year old male with history of sciatica, BPH s/p HoLEP (1/2023) with bladder neck contracture, DM, HTN, CHF (LVEF 50-55% 11/2023), DVT/PE (on Xarelto), SLE, HCV, DM2, ESRD (s/p kidney transplant 2017 on tacrolimus and MMF) presenting to the ED for evaluation. He was seen by urology and passed TOV 1/15/24. He reports that he has been unable to pass urine since early this afternoon. He notes back pain and suprapubic fullness.  He denies acute weakness, numbness, bowel incontinence, or saddle anesthesia. He underwent MRI L spine 8/202 which demonstrated L1-L2 posterior disc buldge with bialteral facet hypertrophy and mild R neural foraminal stenosis without central canal stenosis. He notes some difficulty passing stool but no bowel incontinence. He reports sharp stabbing pain radiating from his R paraspinal lumbar region down to his gluteal musculature and posterior leg, to level of knee, worsening significantly over the last day.  Denies sensory changes.  No fevers, chills or night sweats.     Additional History Obtained from: See HPI       Physical Exam:  General: Grossly well-developed, awake, alert, NAD    Head:  Normocephalic, no overt external signs of trauma    ENT: Mucus membranes moist, nares patent    Neck: Supple, trachea midline    Neurologic: A&Ox4, FS, TM, EOMI, PERRL, GARCIA x 4 with grossly symmetric strength, SILT grossly intact BUE and BLE. No clonus. Babinski's downgoing     Cardiovascular: RRR, no MRG, pulses grossly symmetric    Respiratory: CTA B/L, no wheeze, rales or rhonchi    Abdomen: Soft, not appreciably tender, no evident rebound/guarding, no pulsatile masses palpable.    Back: No apparent CVA TTP, no midline TLS spine TTP, stepoffs, or acute deformities. R paraspinal lumbar TTP.     MSK: No gross bony deformities in BUE and BLE. Grossly symmetric radial/pedal pulses.     Skin/Integumentary: Warm and dry    Psychiatric: Calm and cooperative. Normal mood and affect.          Differential Diagnoses Considered:  See MDM below    Chronic Medical Conditions Significantly Affecting Care:  See MDM below    External Records Reviewed:  I reviewed recent and relevant outside records as noted in HPI above and MDM below.     Independent Interpretation of Studies:    Laboratory/Imaging Studies:  Laboratory results personally reviewed and independently interpreted:  CBC without significant thrombocytopenia or leukocytosis. Hb 10.6, prior recent 10.4-11.1  Metabolic panel without RAFAEL, HAGMA or significant electrolyte anomalies. No transaminitis or hyperbilirubinemia  Tacro and MMF level pending  UA pending    Radiology imaging and preliminary and/or final reports personally reviewed/independently interpreted:  MRI pending    Social Determinants of Health Significantly Affecting Care:  See HPI/MDM    Prescription Drug Consideration:  See MDM    Diagnostic testing considered:  See MDM and relevant sections      Medical Decision Making/Course:  73 year old male with history of sciatica, BPH s/p HoLEP (1/2023) with bladder neck contracture, DM, HTN, CHF (LVEF 50-55% 11/2023), DVT/PE (on Xarelto), SLE, HCV, DM2, ESRD (s/p  kidney transplant 2017 on tacrolimus and MMF) presenting to the ED for evaluation. The patient reports history of sciatica but noted significant worsening pain over the last day. He denies acute sensory changes but does report difficulty passing stool/urine.  No obvious signs on exam of acute cord compression including weakness, saddle anesthesia, or abnormal reflexes.  No obvious signs of AAA, nor dissection per history and exam.  Palpable pulses in BLE. Possible alternative etiologies are due to history of bladder neck contracture/recent Walsh catheter removal, however, given history of degenerative disease in lumbar spine and urinary/bowel symptoms, will obtain MRI. Patient did urinate and POCUS obtained relatively shortly afterwards, and approximately 150 ml measured. Discussed replacement of Walsh but patient declined as he was passing urine at that point.  Patient signed out at approximately 0100 to oncoming ED team, pending imaging, UA, re-evaluation and final disposition. Suspect will require admission given poorly controlled symptoms and difficulty functioning at home at present.  An opportunity to ask questions was provided and all were addressed at that time.  The patient verbalized understanding and was in agreement with plan.                Rishabh Martins MD  01/26/24 9742

## 2024-01-24 NOTE — PROGRESS NOTES
Pharmacy Medication History Review    Albert Schwartz is a 73 y.o. male admitted for Failure to thrive in adult. Pharmacy reviewed the patient's qeguy-wo-loujhiuso medications and allergies for accuracy.    The list below reflects the updated PTA list. Comments regarding how patient may be taking medications differently can be found in the Admit Orders Activity  Prior to Admission Medications   Prescriptions Last Dose Informant   OneTouch UltraSoft Lancets misc Unknown Self, Child   Xarelto 20 mg tablet 1/24/2024 Self, Child   Sig: Take 1 tablet (20 mg) by mouth once daily in the evening. Take with meals.   acetaminophen (Tylenol) 325 mg tablet Past Week Self, Child   Sig: Take 1 tablet (325 mg) by mouth every 4 hours if needed for mild pain (1 - 3).   amLODIPine (Norvasc) 2.5 mg tablet 1/24/2024 Self, Child   Sig: TAKE ONE (1) TABLET BY MOUTH ONCE DAILY.   artificial tears, dextran-hypomel-glycerin, 0.1-0.3-0.2 % ophthalmic solution Past Week Self, Child   Sig: Administer 2 drops into both eyes every 4 hours if needed for dry eyes.   atorvastatin (Lipitor) 20 mg tablet 1/24/2024 Self, Child   Sig: Take 1 tablet (20 mg) by mouth once daily.   blood sugar diagnostic (Accu-Chek Tori Plus test strp) strip Unknown Self, Child   Sig: Use as instructed TWICE DAILY FOR TESTING FOR nON INSULIN DEPENDENT DIABETES E11.319   cholecalciferol (Vitamin D-3) 1,250 mcg (50,000 unit) capsule 1/24/2024 Self, Child   Sig: Take 1 capsule (50,000 Units) by mouth once a week.   docusate sodium (Colace) 100 mg capsule Past Week Self, Child   Sig: Take 1 capsule (100 mg) by mouth 2 times a day as needed for constipation (stop if having liquid bowel movements) for up to 7 days.   insulin degludec (Tresiba FlexTouch) 100 unit/mL (3 mL) injection 1/24/2024 Self, Child   Sig: Inject 30 Units under the skin once daily in the morning.   insulin lispro (HumaLOG) 100 unit/mL injection 1/23/2024 Self, Child   Sig: Inject 0.1 mL (10 Units) under the  "skin once daily at bedtime.   metoprolol succinate XL (Toprol-XL) 50 mg 24 hr tablet 1/24/2024 Self, Child   Sig: Take 1 tablet (50 mg) by mouth once daily.   mycophenolate (Cellcept) 250 mg capsule 1/24/2024 Self, Child   Sig: TAKE TWO (2) CAPSULES BY MOUTH TWICE DAILY.   oxyCODONE (Roxicodone) 5 mg immediate release tablet Past Week Self, Child   Sig: Take 1 tablet (5 mg) by mouth every 6 hours if needed for severe pain (7 - 10).   phenazopyridine (Pyridium) 200 mg tablet 1/23/2024 Self, Child   Sig: Take 1 tablet (200 mg) by mouth 3 times a day as needed for bladder spasms.   polyethylene glycol (Glycolax, Miralax) 17 gram packet 1/23/2024 Self, Child   Sig: Take 17 g by mouth once daily for 7 days.   tacrolimus (Prograf) 0.5 mg capsule 1/24/2024 Self, Child   Sig: TAKE TWO (2) CAPSULES BY MOUTH EVERY 12 HOURS.   triamcinolone (Kenalog) 0.1 % cream 1/23/2024 Self, Child   Sig: Apply 1 Application topically 3 times a day as needed. to affected area 2-3 times a day      Facility-Administered Medications: None          The list below reflects the updated allergy list. Please review each documented allergy for additional clarification and justification.  Allergies  Reviewed by Cash Cosme, SarahD on 1/24/2024        Severity Reactions Comments    Penicillins High Anaphylaxis, Hives, Shortness of breath, Swelling swollen tongue    Ace Inhibitors Not Specified Other ESRD    Arb-angiotensin Receptor Antagonist Not Specified Other ESRD    Influenza Tri-split 2007 Vac Not Specified Other     Oxycodone-acetaminophen Not Specified Other \"ZONED OUT\"    Penicillin Not Specified Swelling     Ceftriaxone Low Itching, Rash pt endorces itching to face and abd            Patient accepts M2B at discharge. Pharmacy has been updated to  [facility].    Sources used to complete the med history include   - Prior to admission medication grid  - Medication dispense history  - OARRS yes  - Patient and daughter interview. Daughter is a " reliable historian    No additional concerns with the patient's PTA list.      Heydi Cosme, PharmD   Meds PGY1 Pharmacy Resident    Meds Ambulatory [facility] Services  Please reach out via [employer] Secure Chat for questions, or if no response call [phone] or [facility] “MedRec”

## 2024-01-24 NOTE — PROGRESS NOTES
Patient signed out to me at  0200 by Catherine Zurita CNP. Please see previous note, HPI, physical exam and course of stay.      Albert Schwartz   presented to Emergency Department  for   Chief Complaint   Patient presents with    Back Pain   In brief patient is a 73 year old man, being evaluated for urinary retention and back pain. Pending MRI lumbar spine and UA.     What occured after transition of care: Bladder scan results are less than 100ml. MRI lumbar spine shows diffuse disc bulging from L1-S1 and stenosis from L2-S1.  Results discussed with patient. Spine consulted. UA shows moderate leukocytes but no infection.  Patient declined Walsh.  Patient states he is voiding and the concern was not that he was not voiding, but unable to ambulate due to extreme pain to void. Dilaudid 0.5mg and norflex ordered for pain. Plan is for PT/OT evaluation, possible placement for rehab. Case reviewed with Dr. Tacho Grimm  Sign out to Ameena Anton, APRN-CNP

## 2024-01-24 NOTE — CONSULTS
Orthopaedic Surgery Consult H&P      Requesting Provider / Service:  ED    CC: LBP    HPI: 73M (BPH, bladder neck contracture s/p recent dilation, ESRD, CHF, HTN, DVT/PE) presents w/ chronic LBP, MRI L-spine w/ mild canal stenosis, consulted for increased back pain, urinary difficulty. Reports increased LBP, denies urinary problems post stricture dilation, LE N/W, B/B incontinence, or saddle anesthesia. Full strength/ sensation BLLE on exam, no UMNs. PVR w/ 115, no urinary retention. MRI w/ L3/4, L4/5 DDD, mild canal stenosis, and mild/mod bilateral foraminal stenosis.       PMH:  Past Medical History:   Diagnosis Date    Alcohol abuse, in remission     Arthritis     Bladder neck contracture     Scheduled for surgery with Dr. Gonzales on 12/15/23    Blindness     BPH (benign prostatic hyperplasia)     Chronic diastolic (congestive) heart failure (CMS/HCC) 01/18/2018    Chronic diastolic congestive heart failure    Chronic hepatitis C (CMS/HCC)     Cirrhosis (CMS/HCC)     2/2 ETOH use    Diabetes mellitus (CMS/HCC)     Disorder of male genital organs, unspecified 12/11/2018    Testicular abnormality    ESRD (end stage renal disease) (CMS/HCC)     S/P renal transplant in 2017, F/W Nephrology Dr. Liu, LOV 10/16/2023    GERD (gastroesophageal reflux disease)     Hyperlipidemia     Hypertension     Immunodeficiency, unspecified (CMS/HCC) 01/18/2018    Immunosuppression    Kidney transplant status     Other ascites     Ascites    Other specified personal risk factors, not elsewhere classified 01/18/2018    At risk for infection transmitted from donor    PE (pulmonary thromboembolism) (CMS/HCC)     on Eliquis, F/W cards    Peripheral vascular disease, unspecified (CMS/HCC) 01/18/2018    PAD (peripheral artery disease)       Past Surgical History:   Procedure Laterality Date    CATARACT EXTRACTION  01/18/2018    Cataract Extraction    CYSTOSCOPY      ESOPHAGOGASTRODUODENOSCOPY      FL GUIDED ABDOMINAL PARACENTESIS   03/30/2015    FL GUIDED ABDOMINAL PARACENTESIS 3/30/2015 [facility] AIB LEGACY    FL GUIDED ABDOMINAL PARACENTESIS  04/14/2015    FL GUIDED ABDOMINAL PARACENTESIS 4/14/2015 [facility] INPATIENT LEGACY    FL GUIDED ABDOMINAL PARACENTESIS  08/28/2015    FL GUIDED ABDOMINAL PARACENTESIS 8/28/2015 [facility] AIB LEGACY    KIDNEY SURGERY  06/02/2020    Kidney Surgery    OTHER SURGICAL HISTORY  08/18/2014    Brain Surgery    OTHER SURGICAL HISTORY  06/30/2020    Renal Transplant    OTHER SURGICAL HISTORY  06/02/2020    Incisional Hernia Repair    SPLENECTOMY, TOTAL  06/02/2020    Splenectomy    US GUIDED ABDOMINAL PARACENTESIS  03/20/2014    US GUIDED ABDOMINAL PARACENTESIS 3/20/2014 [facility] AIB LEGACY    US GUIDED ABDOMINAL PARACENTESIS  08/14/2014    US GUIDED ABDOMINAL PARACENTESIS 8/14/2014 [facility] AIB LEGACY    US GUIDED ABDOMINAL PARACENTESIS  11/17/2014    US GUIDED ABDOMINAL PARACENTESIS 11/17/2014 [facility] AIB LEGACY    US GUIDED ABDOMINAL PARACENTESIS  12/11/2014    US GUIDED ABDOMINAL PARACENTESIS 12/11/2014 [facility] CLINICAL LEGACY    US GUIDED ABDOMINAL PARACENTESIS  01/27/2015    US GUIDED ABDOMINAL PARACENTESIS 1/27/2015 [facility] ANCILLARY LEGACY    US GUIDED ABDOMINAL PARACENTESIS  02/19/2015    US GUIDED ABDOMINAL PARACENTESIS 2/19/2015 [facility] AIB LEGACY    US GUIDED ABDOMINAL PARACENTESIS  03/18/2015    US GUIDED ABDOMINAL PARACENTESIS 3/18/2015 [facility] AIB LEGACY    US GUIDED ABDOMINAL PARACENTESIS  04/17/2015    US GUIDED ABDOMINAL PARACENTESIS 4/17/2015 [facility] INPATIENT LEGACY    US GUIDED ABDOMINAL PARACENTESIS  07/14/2015    US GUIDED ABDOMINAL PARACENTESIS 7/14/2015 [facility] AIB LEGACY    US GUIDED ABDOMINAL PARACENTESIS  10/12/2015    US GUIDED ABDOMINAL PARACENTESIS 10/12/2015 [facility] CLINICAL LEGACY    US GUIDED ABDOMINAL PARACENTESIS  10/19/2015    US GUIDED ABDOMINAL PARACENTESIS 10/19/2015 [facility] CLINICAL LEGACY       Social History     Socioeconomic History    Marital status: Single     Spouse name: Not on file    Number of children: Not on file     "Years of education: Not on file    Highest education level: Not on file   Occupational History    Not on file   Tobacco Use    Smoking status: Former     Packs/day: 0.50     Years: 15.00     Additional pack years: 0.00     Total pack years: 7.50     Types: Cigarettes     Quit date:      Years since quittin.0    Smokeless tobacco: Never   Vaping Use    Vaping Use: Never used   Substance and Sexual Activity    Alcohol use: Not Currently    Drug use: Never    Sexual activity: Defer   Other Topics Concern    Not on file   Social History Narrative    Not on file     Social Determinants of Health     Financial Resource Strain: Not on file   Food Insecurity: Not on file   Transportation Needs: Not on file   Physical Activity: Not on file   Stress: Not on file   Social Connections: Not on file   Intimate Partner Violence: Not on file   Housing Stability: Not on file       Allergies   Allergen Reactions    Penicillins Anaphylaxis, Hives, Shortness of breath and Swelling     swollen tongue    Ace Inhibitors Other     ESRD    Arb-Angiotensin Receptor Antagonist Other     ESRD    Influenza Tri-Split 2007 Vac Other    Oxycodone-Acetaminophen Other     \"ZONED OUT\"    Penicillin Swelling    Ceftriaxone Itching and Rash     pt endorces itching to face and abd         Current Facility-Administered Medications:     lidocaine 4 % patch 1 patch, 1 patch, transdermal, Daily, Catherine Zurita, APRN-CNP, 1 patch at 24    orphenadrine (Norflex) injection 60 mg, 60 mg, intravenous, Once, Cinthia Anton, APRN-CNP    Current Outpatient Medications:     acetaminophen (Tylenol) 325 mg tablet, , Disp: , Rfl:     amLODIPine (Norvasc) 2.5 mg tablet, TAKE ONE (1) TABLET BY MOUTH ONCE DAILY., Disp: 90 tablet, Rfl: 3    artificial tears, dextran-hypomel-glycerin, 0.1-0.3-0.2 % ophthalmic solution, Administer 2 drops into both eyes every 4 hours if needed for dry eyes., Disp: , Rfl:     atorvastatin (Lipitor) 20 mg tablet, Take 1 " tablet (20 mg) by mouth once daily., Disp: , Rfl:     blood sugar diagnostic (Accu-Chek Tori Plus test strp) strip, Use as instructed TWICE DAILY FOR TESTING FOR nON INSULIN DEPENDENT DIABETES E11.319, Disp: , Rfl:     cholecalciferol (Vitamin D-3) 1,250 mcg (50,000 unit) capsule, Take 1 capsule (50,000 Units) by mouth once a week., Disp: , Rfl:     insulin degludec (Tresiba FlexTouch) 100 unit/mL (3 mL) injection, Inject 30 Units under the skin once daily in the morning. And 10 units in the PM, Disp: , Rfl:     insulin lispro (HumaLOG) 100 unit/mL injection, Inject 0.1 mL (10 Units) under the skin once daily at bedtime., Disp: , Rfl:     metoprolol succinate XL (Toprol-XL) 50 mg 24 hr tablet, Take 1 tablet (50 mg) by mouth once daily., Disp: , Rfl:     mycophenolate (Cellcept) 250 mg capsule, TAKE TWO (2) CAPSULES BY MOUTH TWICE DAILY., Disp: 360 capsule, Rfl: 3    OneTouch UltraSoft Lancets misc, , Disp: , Rfl:     polyethylene glycol (Glycolax, Miralax) 17 gram/dose powder, Take by mouth.  as directed, Disp: , Rfl:     prednisoLONE acetate (Pred-Forte) 1 % ophthalmic suspension, Administer 1 drop into the left eye 4 times a day. use until your next eye exam, Disp: , Rfl:     Stool Softener-Laxative 8.6-50 mg tablet, , Disp: , Rfl:     tacrolimus (Prograf) 0.5 mg capsule, TAKE TWO (2) CAPSULES BY MOUTH EVERY 12 HOURS., Disp: 360 capsule, Rfl: 3    tacrolimus (Prograf) 1 mg capsule, Take 1 capsule (1 mg) by mouth twice a day., Disp: , Rfl:     tadalafil 20 mg tablet, Take 1 tablet (20 mg) by mouth once daily as needed for erectile dysfunction (1 HOUR BEFORE NEEDED)., Disp: , Rfl:     triamcinolone (Kenalog) 0.1 % cream, Apply 1 Application topically. to affected area 2-3 times a day, Disp: , Rfl:     Xarelto 20 mg tablet, Take 1 tablet (20 mg) by mouth once daily in the evening. Take with meals., Disp: , Rfl:     Family History: non-contributory      Review of Systems:   ROS  No pertinent symptoms related to  systems other than those stated above      O:  @24HRVITALS@  No intake or output data in the 24 hours ending 01/24/24 0749    Physical Exam:   /75   Pulse 86   Resp 20   SpO2 98%     Constitutional: NAD  HEENT: hearing and vision grossly intact, MMM  Resp: breathing comfortably on RA  CV: extremities warm, well perfused  Neuro: alert, sensory and motor grossly intact  Psych: Appropriate mood and affect    Spine:    L1: SILT       L2: SILT      Hip flexors 5/5 Left; 5/5 Right  L3: SILT      Knee extension 5/5 Left; 5/5 Right  L4: SILT      Tib Ant. (Dorsiflexion) 5/5 Left; 5/5 Right  L5: SILT      EHL 5/5 Left; 5/5 Right  S1: SILT      Plantar flexion 5/5 Left; 5/5 Right    Patellar reflex: 2+   Bilaterally      Babinkski: Intact  No clonus    Relevant Results  Results for orders placed or performed during the hospital encounter of 01/23/24 (from the past 24 hour(s))   CBC and Auto Differential   Result Value Ref Range    WBC 6.9 4.4 - 11.3 x10*3/uL    nRBC 0.0 0.0 - 0.0 /100 WBCs    RBC 4.12 (L) 4.50 - 5.90 x10*6/uL    Hemoglobin 10.6 (L) 13.5 - 17.5 g/dL    Hematocrit 30.2 (L) 41.0 - 52.0 %    MCV 73 (L) 80 - 100 fL    MCH 25.7 (L) 26.0 - 34.0 pg    MCHC 35.1 32.0 - 36.0 g/dL    RDW 17.6 (H) 11.5 - 14.5 %    Platelets 416 150 - 450 x10*3/uL    Neutrophils % 51.3 40.0 - 80.0 %    Immature Granulocytes %, Automated 0.4 0.0 - 0.9 %    Lymphocytes % 36.7 13.0 - 44.0 %    Monocytes % 9.5 2.0 - 10.0 %    Eosinophils % 1.4 0.0 - 6.0 %    Basophils % 0.7 0.0 - 2.0 %    Neutrophils Absolute 3.55 1.60 - 5.50 x10*3/uL    Immature Granulocytes Absolute, Automated 0.03 0.00 - 0.50 x10*3/uL    Lymphocytes Absolute 2.55 0.80 - 3.00 x10*3/uL    Monocytes Absolute 0.66 0.05 - 0.80 x10*3/uL    Eosinophils Absolute 0.10 0.00 - 0.40 x10*3/uL    Basophils Absolute 0.05 0.00 - 0.10 x10*3/uL   Comprehensive metabolic panel   Result Value Ref Range    Glucose 274 (H) 74 - 99 mg/dL    Sodium 137 136 - 145 mmol/L    Potassium 3.8  3.5 - 5.3 mmol/L    Chloride 106 98 - 107 mmol/L    Bicarbonate 19 (L) 21 - 32 mmol/L    Anion Gap 16 10 - 20 mmol/L    Urea Nitrogen 18 6 - 23 mg/dL    Creatinine 1.04 0.50 - 1.30 mg/dL    eGFR 76 >60 mL/min/1.73m*2    Calcium 9.7 8.6 - 10.6 mg/dL    Albumin 3.9 3.4 - 5.0 g/dL    Alkaline Phosphatase 42 33 - 136 U/L    Total Protein 7.8 6.4 - 8.2 g/dL    AST 17 9 - 39 U/L    Bilirubin, Total 0.6 0.0 - 1.2 mg/dL    ALT 8 (L) 10 - 52 U/L   Urinalysis with Reflex Culture and Microscopic   Result Value Ref Range    Color, Urine Yellow Straw, Yellow    Appearance, Urine Hazy (N) Clear    Specific Gravity, Urine 1.019 1.005 - 1.035    pH, Urine 6.0 5.0, 5.5, 6.0, 6.5, 7.0, 7.5, 8.0    Protein, Urine 100 (2+) (N) NEGATIVE mg/dL    Glucose, Urine 50 (1+) (A) NEGATIVE mg/dL    Blood, Urine LARGE (3+) (A) NEGATIVE    Ketones, Urine NEGATIVE NEGATIVE mg/dL    Bilirubin, Urine NEGATIVE NEGATIVE    Urobilinogen, Urine <2.0 <2.0 mg/dL    Nitrite, Urine NEGATIVE NEGATIVE    Leukocyte Esterase, Urine MODERATE (2+) (A) NEGATIVE   Microscopic Only, Urine   Result Value Ref Range    WBC, Urine >50 (A) 1-5, NONE /HPF    RBC, Urine >20 (A) NONE, 1-2, 3-5 /HPF    Mucus, Urine 1+ Reference range not established. /LPF       MR lumbar spine wo IV contrast    Result Date: 1/24/2024  Interpreted By:  Curry Franco,  and Yvonne Montelongo STUDY: MR LUMBAR SPINE WO IV CONTRAST;  1/24/2024 12:25 am   INDICATION: Signs/Symptoms:back pain.   COMPARISON: MRI of the lumbar spine on 08/31/2020   ACCESSION NUMBER(S): [id number]   ORDERING CLINICIAN: KELLY JOHNSON   TECHNIQUE: Sagittal T1, T2, STIR, axial T1 and T2 weighted images of the lumbar spine were acquired.   FINDINGS: Alignment: There is straightening of lumbar lordosis and minimal degenerative retrolisthesis of L3 over L4.   Vertebrae/Intervertebral Discs: The vertebral bodies demonstrate expected height.There is a STIR hyperintense Schmorl node at the inferior endplate of L3. Otherwise, the  marrow signal is within normal limits. There is diffuse disc desiccation with mild degenerative disc space narrowing at L3-L4 and L4-L5.   There is congenital narrowing of the spinal canal from L2 through L5 due to short pedicles, similar to prior. Prominent dorsal and ventral epidural fat at L3-L5.   Conus: The lower thoracic cord appears unremarkable. The conus terminates at T12-L1.   T12-L1:  There is no significant central canal or neural foraminal stenosis.   L1-2: Mild disc bulge asymmetric to the right, bilateral facet hypertrophy and ligamentum flavum thickening, without significant central canal or foraminal stenosis.   L2-3: Diffuse disc bulge, along with bilateral facet and ligamentum flavum hypertrophy, with posterior osteophytic spurring leads to minimal central canal stenosis at this level. Mild bilateral foraminal stenosis. Small bilateral facet joint effusions.   L3-4: Diffuse disc bulge, along with central disc osteophyte complex, which along with bilateral facet and ligamentum flavum hypertrophy and epidural fat leads to mild central canal stenosis and mild to moderate bilateral neural foraminal stenosis.   L4-5: Diffuse disc bulge, facet and ligamentum flavum hypertrophy bilaterally causes mild central spinal canal stenosis at this level. There is mild-to-moderate bilateral foraminal stenosis due to facet arthropathy. Small bilateral facet joint effusions.   L5-S1: Mild diffuse disc bulge, facet and ligamentum flavum hypertrophy and posterior osteophytic spurring without significant central spinal canal stenosis. Mild bilateral foraminal narrowing.   Mild fatty atrophy of the bilateral lower paravertebral muscles at the levels of L4 through S1 similar to prior.   Multiple T2 hyperintense lesions within the right-greater-than-left kidneys, consistent with simple cysts. There is bilateral renal atrophy. There is also a partially visualized transplant kidney in the right lower quadrant.       1.  Slightly small developmentally lumbar spinal canal. There is minimal progression of disc degeneration at L3-L4 and L4-L5 with mild central canal stenosis and mild-to-moderate bilateral foraminal stenosis at these levels. 2. No severe spinal canal or neural foraminal stenosis.   I personally reviewed the images/study and I agree with the findings as stated. This study was interpreted at [facility], [place], [place].   MACRO: None   Signed by: Curry Franco 1/24/2024 4:18 AM Dictation workstation:   [id number]    Point of Care Ultrasound    Result Date: 1/23/2024  Elvin Cedillo MD     1/23/2024 10:27 PM Performed by: Elvin Cedillo MD Authorized by: Elvin Cedillo MD  Genitourinary Indications: retention of urine Musculoskeletal Indications: back pain Procedure: Bladder Ultrasound Findings: Bladder Visualization: The bladder was visualized and was DISTENDED. Impression: Bladder: The bladder was DISTENDED. Comments: PVR of ~150 ml. Bladder wall mildly thickened.         Imaging:   MRI L-spine w/ L3/4, L4/5 DDD, mild canal stenosis, and mild/mod bilateral foraminal stenosis      A/P: 73M (BPH, bladder neck contracture s/p recent dilation, ESRD, CHF, HTN, DVT/PE) presents w/ chronic LBP, MRI L-spine w/ mild canal stenosis, consulted for increased back pain, urinary difficulty and MRI findings. Reports increased LBP, denies urinary problems post stricture dilation, LE N/W, B/B incontinence, or saddle anesthesia. Full strength/ sensation BLLE on exam, no UMNs. PVR w/ 115, no urinary retention. MRI w/ L3/4, L4/5 DDD, mild canal stenosis, and mild/mod bilateral foraminal stenosis. Exam/imaging consistent w/ chronic findings.    - No acute orthopedic intervention  - WB: WBAT BLLE  - Recommend PT targeted at low-back  - Pain management per ED    - Dispo: F/u ortho spine PRN    Discussed with attending on call, Dr. Ying.    Please do not hesitate to page with additional questions or  concerns.    ------------------------------------------------------    Kunal Ventura MD  Orthopaedic Surgery, PGY-2  Available by Epic Chat  01/24/24  7:49 AM      After 5 pm and on weekends, please page the on-call resident ([phone]) with questions or concerns.      01/24/24  This consult was seen and staffed within 30 minutes of the initial consult.    I saw and evaluated the patient.  I personally obtained the key and critical portions of the history and physical exam or was physically present for key and critical portions performed by the Resident. I reviewed the documentation and discussed the patient with the Resident.  I agree with the Resident’s medical decision making as documented in the note.    Patient is a 73-year-old male history of BPH with bladder neck contracture status post dilation, end-stage renal disease, CHF, hypertension and DVT and PE who was seen in emergency room today with complaints of chronic low back pain.  He otherwise denies any bowel or bladder disturbances or saddle anesthesia.  He was able to urinate with no evidence of retention of urine in the emergency room he has full strength with no weakness.  MRI shows degenerative changes no significant central canal stenosis no compression of the cauda equina no evidence of infectious process.  No indication for any type of acute surgical intervention.  I think that he would benefit from a course of physical therapy he can follow-up with his primary care physician for this.

## 2024-01-25 LAB
ALBUMIN SERPL BCP-MCNC: 3.8 G/DL (ref 3.4–5)
ANION GAP SERPL CALC-SCNC: 10 MMOL/L (ref 10–20)
BACTERIA UR CULT: NORMAL
BUN SERPL-MCNC: 16 MG/DL (ref 6–23)
CALCIUM SERPL-MCNC: 9.4 MG/DL (ref 8.6–10.6)
CHLORIDE SERPL-SCNC: 105 MMOL/L (ref 98–107)
CO2 SERPL-SCNC: 29 MMOL/L (ref 21–32)
CREAT SERPL-MCNC: 0.9 MG/DL (ref 0.5–1.3)
EGFRCR SERPLBLD CKD-EPI 2021: 90 ML/MIN/1.73M*2
ERYTHROCYTE [DISTWIDTH] IN BLOOD BY AUTOMATED COUNT: 18.1 % (ref 11.5–14.5)
GLUCOSE BLD MANUAL STRIP-MCNC: 175 MG/DL (ref 74–99)
GLUCOSE BLD MANUAL STRIP-MCNC: 87 MG/DL (ref 74–99)
GLUCOSE SERPL-MCNC: 152 MG/DL (ref 74–99)
HCT VFR BLD AUTO: 29 % (ref 41–52)
HGB BLD-MCNC: 10 G/DL (ref 13.5–17.5)
HOLD SPECIMEN: NORMAL
HOLD SPECIMEN: NORMAL
MAGNESIUM SERPL-MCNC: 1.53 MG/DL (ref 1.6–2.4)
MCH RBC QN AUTO: 25.5 PG (ref 26–34)
MCHC RBC AUTO-ENTMCNC: 34.5 G/DL (ref 32–36)
MCV RBC AUTO: 74 FL (ref 80–100)
MYCOPHENOLATE SERPL-MCNC: 0.9 UG/ML (ref 1–3.5)
NRBC BLD-RTO: 0 /100 WBCS (ref 0–0)
PHOSPHATE SERPL-MCNC: 2.8 MG/DL (ref 2.5–4.9)
PLATELET # BLD AUTO: 419 X10*3/UL (ref 150–450)
POTASSIUM SERPL-SCNC: 4 MMOL/L (ref 3.5–5.3)
RBC # BLD AUTO: 3.92 X10*6/UL (ref 4.5–5.9)
SCAN RESULT: ABNORMAL
SODIUM SERPL-SCNC: 140 MMOL/L (ref 136–145)
TACROLIMUS BLD-MCNC: 10.4 NG/ML
WBC # BLD AUTO: 7.5 X10*3/UL (ref 4.4–11.3)

## 2024-01-25 PROCEDURE — 2500000001 HC RX 250 WO HCPCS SELF ADMINISTERED DRUGS (ALT 637 FOR MEDICARE OP): Performed by: STUDENT IN AN ORGANIZED HEALTH CARE EDUCATION/TRAINING PROGRAM

## 2024-01-25 PROCEDURE — 82947 ASSAY GLUCOSE BLOOD QUANT: CPT | Mod: 59

## 2024-01-25 PROCEDURE — 83735 ASSAY OF MAGNESIUM: CPT | Performed by: STUDENT IN AN ORGANIZED HEALTH CARE EDUCATION/TRAINING PROGRAM

## 2024-01-25 PROCEDURE — 2500000005 HC RX 250 GENERAL PHARMACY W/O HCPCS

## 2024-01-25 PROCEDURE — 80069 RENAL FUNCTION PANEL: CPT | Performed by: STUDENT IN AN ORGANIZED HEALTH CARE EDUCATION/TRAINING PROGRAM

## 2024-01-25 PROCEDURE — 2500000002 HC RX 250 W HCPCS SELF ADMINISTERED DRUGS (ALT 637 FOR MEDICARE OP, ALT 636 FOR OP/ED)

## 2024-01-25 PROCEDURE — G0378 HOSPITAL OBSERVATION PER HR: HCPCS

## 2024-01-25 PROCEDURE — 82947 ASSAY GLUCOSE BLOOD QUANT: CPT

## 2024-01-25 PROCEDURE — 2500000001 HC RX 250 WO HCPCS SELF ADMINISTERED DRUGS (ALT 637 FOR MEDICARE OP)

## 2024-01-25 PROCEDURE — 80197 ASSAY OF TACROLIMUS: CPT

## 2024-01-25 PROCEDURE — 36415 COLL VENOUS BLD VENIPUNCTURE: CPT

## 2024-01-25 PROCEDURE — 2500000004 HC RX 250 GENERAL PHARMACY W/ HCPCS (ALT 636 FOR OP/ED)

## 2024-01-25 PROCEDURE — 99222 1ST HOSP IP/OBS MODERATE 55: CPT | Performed by: INTERNAL MEDICINE

## 2024-01-25 PROCEDURE — 85027 COMPLETE CBC AUTOMATED: CPT | Performed by: STUDENT IN AN ORGANIZED HEALTH CARE EDUCATION/TRAINING PROGRAM

## 2024-01-25 PROCEDURE — 2500000004 HC RX 250 GENERAL PHARMACY W/ HCPCS (ALT 636 FOR OP/ED): Performed by: STUDENT IN AN ORGANIZED HEALTH CARE EDUCATION/TRAINING PROGRAM

## 2024-01-25 PROCEDURE — 99235 HOSP IP/OBS SAME DATE MOD 70: CPT | Performed by: STUDENT IN AN ORGANIZED HEALTH CARE EDUCATION/TRAINING PROGRAM

## 2024-01-25 PROCEDURE — 96366 THER/PROPH/DIAG IV INF ADDON: CPT | Mod: 59

## 2024-01-25 RX ORDER — ACETAMINOPHEN 325 MG/1
650 TABLET ORAL EVERY 4 HOURS PRN
Qty: 60 TABLET | Refills: 0 | Status: SHIPPED | OUTPATIENT
Start: 2024-01-25 | End: 2024-02-24

## 2024-01-25 RX ORDER — AMLODIPINE BESYLATE 10 MG/1
10 TABLET ORAL DAILY
Status: DISCONTINUED | OUTPATIENT
Start: 2024-01-25 | End: 2024-01-29 | Stop reason: HOSPADM

## 2024-01-25 RX ORDER — SULFAMETHOXAZOLE AND TRIMETHOPRIM 800; 160 MG/1; MG/1
1 TABLET ORAL 2 TIMES DAILY
Qty: 10 TABLET | Refills: 0 | Status: SHIPPED | OUTPATIENT
Start: 2024-01-25 | End: 2024-01-28 | Stop reason: HOSPADM

## 2024-01-25 RX ORDER — TACROLIMUS 0.5 MG/1
1 CAPSULE ORAL EVERY 12 HOURS
Qty: 120 CAPSULE | Refills: 0 | Status: SHIPPED | OUTPATIENT
Start: 2024-01-25 | End: 2024-02-24

## 2024-01-25 RX ORDER — OXYCODONE HYDROCHLORIDE 5 MG/1
10 TABLET ORAL EVERY 6 HOURS PRN
Status: DISCONTINUED | OUTPATIENT
Start: 2024-01-25 | End: 2024-01-29 | Stop reason: HOSPADM

## 2024-01-25 RX ORDER — DOCUSATE SODIUM 100 MG/1
100 CAPSULE, LIQUID FILLED ORAL 2 TIMES DAILY PRN
Qty: 14 CAPSULE | Refills: 0 | Status: SHIPPED | OUTPATIENT
Start: 2024-01-25 | End: 2024-02-24

## 2024-01-25 RX ORDER — RIVAROXABAN 20 MG/1
20 TABLET, FILM COATED ORAL
Qty: 30 TABLET | Refills: 0 | Status: SHIPPED | OUTPATIENT
Start: 2024-01-25 | End: 2024-04-17

## 2024-01-25 RX ORDER — LIDOCAINE 560 MG/1
1 PATCH PERCUTANEOUS; TOPICAL; TRANSDERMAL DAILY
Qty: 30 PATCH | Refills: 0 | Status: SHIPPED | OUTPATIENT
Start: 2024-01-26 | End: 2024-02-25

## 2024-01-25 RX ORDER — OXYCODONE HYDROCHLORIDE 10 MG/1
10 TABLET ORAL EVERY 6 HOURS PRN
Qty: 15 TABLET | Refills: 0 | Status: SHIPPED | OUTPATIENT
Start: 2024-01-25 | End: 2024-01-30

## 2024-01-25 RX ORDER — OXYCODONE HYDROCHLORIDE 5 MG/1
5 TABLET ORAL EVERY 6 HOURS PRN
Status: DISCONTINUED | OUTPATIENT
Start: 2024-01-25 | End: 2024-01-25

## 2024-01-25 RX ORDER — HYDRALAZINE HYDROCHLORIDE 20 MG/ML
5 INJECTION INTRAMUSCULAR; INTRAVENOUS ONCE
Status: COMPLETED | OUTPATIENT
Start: 2024-01-25 | End: 2024-01-25

## 2024-01-25 RX ORDER — OXYCODONE HYDROCHLORIDE 5 MG/1
5 TABLET ORAL EVERY 6 HOURS PRN
Status: DISCONTINUED | OUTPATIENT
Start: 2024-01-25 | End: 2024-01-29 | Stop reason: HOSPADM

## 2024-01-25 RX ORDER — HYDROMORPHONE HYDROCHLORIDE 1 MG/ML
0.2 INJECTION, SOLUTION INTRAMUSCULAR; INTRAVENOUS; SUBCUTANEOUS ONCE
Status: COMPLETED | OUTPATIENT
Start: 2024-01-25 | End: 2024-01-25

## 2024-01-25 RX ORDER — OXYCODONE HYDROCHLORIDE 5 MG/1
5 TABLET ORAL EVERY 6 HOURS PRN
Qty: 15 TABLET | Refills: 0 | Status: SHIPPED | OUTPATIENT
Start: 2024-01-25 | End: 2024-01-30

## 2024-01-25 RX ADMIN — LIDOCAINE 1 PATCH: 4 PATCH TOPICAL at 07:55

## 2024-01-25 RX ADMIN — TACROLIMUS 1 MG: 1 CAPSULE ORAL at 21:25

## 2024-01-25 RX ADMIN — TACROLIMUS 1 MG: 1 CAPSULE ORAL at 07:52

## 2024-01-25 RX ADMIN — MYCOPHENOLATE MOFETIL 500 MG: 250 CAPSULE ORAL at 09:04

## 2024-01-25 RX ADMIN — PHENAZOPYRIDINE 100 MG: 100 TABLET ORAL at 00:34

## 2024-01-25 RX ADMIN — ACETAMINOPHEN 650 MG: 325 TABLET ORAL at 11:49

## 2024-01-25 RX ADMIN — ACETAMINOPHEN 650 MG: 325 TABLET ORAL at 17:53

## 2024-01-25 RX ADMIN — HYDRALAZINE HYDROCHLORIDE 5 MG: 20 INJECTION INTRAMUSCULAR; INTRAVENOUS at 03:28

## 2024-01-25 RX ADMIN — OXYCODONE HYDROCHLORIDE 5 MG: 5 TABLET ORAL at 11:49

## 2024-01-25 RX ADMIN — INSULIN DEGLUDEC INJECTION 15 UNITS: 100 INJECTION, SOLUTION SUBCUTANEOUS at 06:39

## 2024-01-25 RX ADMIN — MYCOPHENOLATE MOFETIL 500 MG: 250 CAPSULE ORAL at 00:34

## 2024-01-25 RX ADMIN — HYDROMORPHONE HYDROCHLORIDE 0.2 MG: 1 INJECTION, SOLUTION INTRAMUSCULAR; INTRAVENOUS; SUBCUTANEOUS at 03:27

## 2024-01-25 RX ADMIN — ACETAMINOPHEN 650 MG: 325 TABLET ORAL at 00:34

## 2024-01-25 RX ADMIN — POLYETHYLENE GLYCOL 3350 17 G: 17 POWDER, FOR SOLUTION ORAL at 07:57

## 2024-01-25 RX ADMIN — AMLODIPINE BESYLATE 10 MG: 10 TABLET ORAL at 15:54

## 2024-01-25 RX ADMIN — RIVAROXABAN 20 MG: 20 TABLET, FILM COATED ORAL at 18:49

## 2024-01-25 RX ADMIN — OXYCODONE HYDROCHLORIDE 10 MG: 5 TABLET ORAL at 17:52

## 2024-01-25 RX ADMIN — MYCOPHENOLATE MOFETIL 500 MG: 250 CAPSULE ORAL at 21:25

## 2024-01-25 RX ADMIN — METOPROLOL SUCCINATE 50 MG: 50 TABLET, EXTENDED RELEASE ORAL at 07:54

## 2024-01-25 RX ADMIN — OXYCODONE HYDROCHLORIDE 5 MG: 5 TABLET ORAL at 00:34

## 2024-01-25 RX ADMIN — CEFEPIME 1 G: 1 INJECTION, SOLUTION INTRAVENOUS at 17:11

## 2024-01-25 RX ADMIN — INSULIN LISPRO 1 UNITS: 100 INJECTION, SOLUTION INTRAVENOUS; SUBCUTANEOUS at 17:50

## 2024-01-25 RX ADMIN — ATORVASTATIN CALCIUM 20 MG: 20 TABLET, FILM COATED ORAL at 07:57

## 2024-01-25 RX ADMIN — CEFEPIME 1 G: 1 INJECTION, SOLUTION INTRAVENOUS at 05:24

## 2024-01-25 RX ADMIN — AMLODIPINE BESYLATE 2.5 MG: 5 TABLET ORAL at 07:56

## 2024-01-25 SDOH — SOCIAL STABILITY: SOCIAL INSECURITY: DOES ANYONE TRY TO KEEP YOU FROM HAVING/CONTACTING OTHER FRIENDS OR DOING THINGS OUTSIDE YOUR HOME?: NO

## 2024-01-25 SDOH — SOCIAL STABILITY: SOCIAL INSECURITY: DO YOU FEEL UNSAFE GOING BACK TO THE PLACE WHERE YOU ARE LIVING?: NO

## 2024-01-25 SDOH — SOCIAL STABILITY: SOCIAL INSECURITY: HAVE YOU HAD THOUGHTS OF HARMING ANYONE ELSE?: NO

## 2024-01-25 SDOH — SOCIAL STABILITY: SOCIAL INSECURITY: ABUSE: ADULT

## 2024-01-25 SDOH — SOCIAL STABILITY: SOCIAL INSECURITY: DO YOU FEEL ANYONE HAS EXPLOITED OR TAKEN ADVANTAGE OF YOU FINANCIALLY OR OF YOUR PERSONAL PROPERTY?: NO

## 2024-01-25 SDOH — SOCIAL STABILITY: SOCIAL INSECURITY: WERE YOU ABLE TO COMPLETE ALL THE BEHAVIORAL HEALTH SCREENINGS?: YES

## 2024-01-25 SDOH — SOCIAL STABILITY: SOCIAL INSECURITY: ARE YOU OR HAVE YOU BEEN THREATENED OR ABUSED PHYSICALLY, EMOTIONALLY, OR SEXUALLY BY ANYONE?: NO

## 2024-01-25 SDOH — SOCIAL STABILITY: SOCIAL INSECURITY: HAS ANYONE EVER THREATENED TO HURT YOUR FAMILY OR YOUR PETS?: NO

## 2024-01-25 SDOH — SOCIAL STABILITY: SOCIAL INSECURITY: ARE THERE ANY APPARENT SIGNS OF INJURIES/BEHAVIORS THAT COULD BE RELATED TO ABUSE/NEGLECT?: NO

## 2024-01-25 ASSESSMENT — COGNITIVE AND FUNCTIONAL STATUS - GENERAL
MOVING FROM LYING ON BACK TO SITTING ON SIDE OF FLAT BED WITH BEDRAILS: A LITTLE
WALKING IN HOSPITAL ROOM: A LOT
TOILETING: TOTAL
HELP NEEDED FOR BATHING: A LOT
PERSONAL GROOMING: A LITTLE
DAILY ACTIVITIY SCORE: 13
PATIENT BASELINE BEDBOUND: NO
DRESSING REGULAR UPPER BODY CLOTHING: A LOT
MOVING TO AND FROM BED TO CHAIR: TOTAL
DRESSING REGULAR LOWER BODY CLOTHING: TOTAL
MOBILITY SCORE: 12
STANDING UP FROM CHAIR USING ARMS: A LOT
CLIMB 3 TO 5 STEPS WITH RAILING: TOTAL
TURNING FROM BACK TO SIDE WHILE IN FLAT BAD: A LITTLE

## 2024-01-25 ASSESSMENT — ACTIVITIES OF DAILY LIVING (ADL)
PATIENT'S MEMORY ADEQUATE TO SAFELY COMPLETE DAILY ACTIVITIES?: YES
FEEDING YOURSELF: NEEDS ASSISTANCE
HEARING - RIGHT EAR: FUNCTIONAL
LACK_OF_TRANSPORTATION: NO
HEARING - LEFT EAR: FUNCTIONAL
TOILETING: NEEDS ASSISTANCE
DRESSING YOURSELF: NEEDS ASSISTANCE
BATHING: NEEDS ASSISTANCE
WALKS IN HOME: NEEDS ASSISTANCE
GROOMING: NEEDS ASSISTANCE
ASSISTIVE_DEVICE: WALKER;WHEELCHAIR
JUDGMENT_ADEQUATE_SAFELY_COMPLETE_DAILY_ACTIVITIES: YES
ADEQUATE_TO_COMPLETE_ADL: NO

## 2024-01-25 ASSESSMENT — PAIN SCALES - GENERAL
PAINLEVEL_OUTOF10: 10 - WORST POSSIBLE PAIN
PAINLEVEL_OUTOF10: 8
PAINLEVEL_OUTOF10: 10 - WORST POSSIBLE PAIN

## 2024-01-25 ASSESSMENT — PAIN - FUNCTIONAL ASSESSMENT
PAIN_FUNCTIONAL_ASSESSMENT: 0-10

## 2024-01-25 ASSESSMENT — PATIENT HEALTH QUESTIONNAIRE - PHQ9
2. FEELING DOWN, DEPRESSED OR HOPELESS: NOT AT ALL
1. LITTLE INTEREST OR PLEASURE IN DOING THINGS: NOT AT ALL
SUM OF ALL RESPONSES TO PHQ9 QUESTIONS 1 & 2: 0

## 2024-01-25 ASSESSMENT — LIFESTYLE VARIABLES
AUDIT-C TOTAL SCORE: 0
AUDIT-C TOTAL SCORE: 0
SKIP TO QUESTIONS 9-10: 1
SUBSTANCE_ABUSE_PAST_12_MONTHS: NO
HOW OFTEN DO YOU HAVE 6 OR MORE DRINKS ON ONE OCCASION: NEVER
HOW OFTEN DO YOU HAVE A DRINK CONTAINING ALCOHOL: NEVER
HOW MANY STANDARD DRINKS CONTAINING ALCOHOL DO YOU HAVE ON A TYPICAL DAY: PATIENT DOES NOT DRINK
PRESCIPTION_ABUSE_PAST_12_MONTHS: NO

## 2024-01-25 ASSESSMENT — PAIN DESCRIPTION - PROGRESSION: CLINICAL_PROGRESSION: GRADUALLY IMPROVING

## 2024-01-25 ASSESSMENT — PAIN DESCRIPTION - LOCATION: LOCATION: BACK

## 2024-01-25 ASSESSMENT — PAIN DESCRIPTION - ORIENTATION: ORIENTATION: LOWER;RIGHT

## 2024-01-25 ASSESSMENT — PAIN DESCRIPTION - DESCRIPTORS: DESCRIPTORS: ACHING

## 2024-01-25 NOTE — PROGRESS NOTES
"Albert Schwartz is a 73 y.o. male on day 0 of admission presenting with Failure to thrive in adult.    Sw met with pt following a consult. Pt reports he wants to leave AMA due to the long wait times for a inpatient bed. Pt reports he sat in a chair in the hospital for 24 hours before getting the bed in the hallway, Pt came in for SNF placement. SW verified the SNF referral has been sent and the facility is waiting pre-cert. Pt stated he refuses to remain in \"Chaos\" in the ED while waiting for a bed to become available. Sw spoke to staff who reports the Medical team will be down to speak to the pt.   Ekaterina LOYA  "

## 2024-01-25 NOTE — CONSULTS
"        INPATIENT INITIAL TRANSPLANT NEPHROLOGY CONSULT          SERVICE DATE: 1/25/2024   SERVICE TIME:  1:15 PM    REASON FOR CONSULT:  Immunosuppressive medication management and nephrology related issues.    REQUESTING PHYSICIAN: Stoney Costa MD;Hernan*  PRIMARY CARE PHYSICIAN: Kendall Zurita MD    ADMISSION DIAGNOSIS:   1. Failure to thrive in adult    2. Unable to ambulate        TRANSPLANT DATE: 2/19/2017 (Kidney)    BLOOD TYPE: A    HPI:    Mr. Schwartz is a 73 y.o. male with past medical history significant for T2DM (A1c 6.8% 12/2022), HTN, cirrhosis 2/2 alcohol use and chronic hep C, SLE, ESRD 2/2 htn and T2DM now s/p kidney transplant in 2017  on tacrolimus and MMF, BPH s/p HoLEP in 1/2023 which was c/b PE now on xarelto s/p IVC filter placement and removal, GERD and HLD. He has a history of BPH s/p HoLEP 1/20/23 and bladder neck dilation on 1/11.  He had been on a liver transplant waitlist in 2015 and was removed from the list due to improved condition per record.    Patient presented to the ER on 1/23/24 with lower back pain. The pain began 2-3 days prior to presentation, located in the right lower back with radiation down the right leg. Not associated with numbness or burning. Has felt this pain before and is similar to his \"sciatica pain\". Denies any recent falls or other trauma.     Transplant nephrology is consulted to assist with immunosuppressive medication management and nephrology related issues.      REVIEW OF SYSTEM:  Review of system was done system by system (10/14). Apart from HPI, other symptoms were negative.    PAST MEDICAL HISTORY:  Past Medical History:   Diagnosis Date    Alcohol abuse, in remission     Arthritis     Bladder neck contracture     Scheduled for surgery with Dr. Gonzales on 12/15/23    Blindness     BPH (benign prostatic hyperplasia)     Chronic diastolic (congestive) heart failure (CMS/[facility]) 01/18/2018    Chronic diastolic congestive heart failure    Chronic " hepatitis C (CMS/HCC)     Cirrhosis (CMS/HCC)     2/2 ETOH use    Diabetes mellitus (CMS/HCC)     Disorder of male genital organs, unspecified 12/11/2018    Testicular abnormality    ESRD (end stage renal disease) (CMS/HCC)     S/P renal transplant in 2017, F/W Nephrology Dr. Liu, LOV 10/16/2023    GERD (gastroesophageal reflux disease)     Hyperlipidemia     Hypertension     Immunodeficiency, unspecified (CMS/HCC) 01/18/2018    Immunosuppression    Kidney transplant status     Other ascites     Ascites    Other specified personal risk factors, not elsewhere classified 01/18/2018    At risk for infection transmitted from donor    PE (pulmonary thromboembolism) (CMS/HCC)     on Eliquis, F/W cards    Peripheral vascular disease, unspecified (CMS/HCC) 01/18/2018    PAD (peripheral artery disease)        PAST SURGICAL HISTORY:  Past Surgical History:   Procedure Laterality Date    CATARACT EXTRACTION  01/18/2018    Cataract Extraction    CYSTOSCOPY      ESOPHAGOGASTRODUODENOSCOPY      FL GUIDED ABDOMINAL PARACENTESIS  03/30/2015    FL GUIDED ABDOMINAL PARACENTESIS 3/30/2015 [facility]IB LEGACY    FL GUIDED ABDOMINAL PARACENTESIS  04/14/2015    FL GUIDED ABDOMINAL PARACENTESIS 4/14/2015 [facility] LEGACY    FL GUIDED ABDOMINAL PARACENTESIS  08/28/2015    FL GUIDED ABDOMINAL PARACENTESIS 8/28/2015 [facility]IB LEGACY    KIDNEY SURGERY  06/02/2020    Kidney Surgery    OTHER SURGICAL HISTORY  08/18/2014    Brain Surgery    OTHER SURGICAL HISTORY  06/30/2020    Renal Transplant    OTHER SURGICAL HISTORY  06/02/2020    Incisional Hernia Repair    SPLENECTOMY, TOTAL  06/02/2020    Splenectomy    US GUIDED ABDOMINAL PARACENTESIS  03/20/2014    US GUIDED ABDOMINAL PARACENTESIS 3/20/2014 [facility]IB LEGACY    US GUIDED ABDOMINAL PARACENTESIS  08/14/2014    US GUIDED ABDOMINAL PARACENTESIS 8/14/2014 [facility] AIB LEGACY    US GUIDED ABDOMINAL PARACENTESIS  11/17/2014    US GUIDED ABDOMINAL PARACENTESIS 11/17/2014 [facility] AIB LEGACY    US GUIDED  ABDOMINAL PARACENTESIS  2014    US GUIDED ABDOMINAL PARACENTESIS 2014 [facility] CLINICAL LEGACY    US GUIDED ABDOMINAL PARACENTESIS  2015    US GUIDED ABDOMINAL PARACENTESIS 2015 [facility] ANCILLARY LEGACY    US GUIDED ABDOMINAL PARACENTESIS  2015    US GUIDED ABDOMINAL PARACENTESIS 2015 [facility] AIB LEGACY    US GUIDED ABDOMINAL PARACENTESIS  2015    US GUIDED ABDOMINAL PARACENTESIS 3/18/2015 [facility] AIB LEGACY    US GUIDED ABDOMINAL PARACENTESIS  2015    US GUIDED ABDOMINAL PARACENTESIS 2015 [facility] INPATIENT LEGACY    US GUIDED ABDOMINAL PARACENTESIS  2015    US GUIDED ABDOMINAL PARACENTESIS 2015 [facility] AIB LEGACY    US GUIDED ABDOMINAL PARACENTESIS  10/12/2015    US GUIDED ABDOMINAL PARACENTESIS 10/12/2015 [facility] CLINICAL LEGACY    US GUIDED ABDOMINAL PARACENTESIS  10/19/2015    US GUIDED ABDOMINAL PARACENTESIS 10/19/2015 [facility] CLINICAL LEGACY        SOCIAL HISTORY:  Social History     Socioeconomic History    Marital status: Single     Spouse name: Not on file    Number of children: Not on file    Years of education: Not on file    Highest education level: Not on file   Occupational History    Not on file   Tobacco Use    Smoking status: Former     Packs/day: 0.50     Years: 15.00     Additional pack years: 0.00     Total pack years: 7.50     Types: Cigarettes     Quit date: 2000     Years since quittin.0    Smokeless tobacco: Never   Vaping Use    Vaping Use: Never used   Substance and Sexual Activity    Alcohol use: Not Currently    Drug use: Never    Sexual activity: Defer   Other Topics Concern    Not on file   Social History Narrative    Not on file     Social Determinants of Health     Financial Resource Strain: Not on file   Food Insecurity: Not on file   Transportation Needs: Not on file   Physical Activity: Not on file   Stress: Not on file   Social Connections: Not on file   Intimate Partner Violence: Not on file   Housing Stability: Not on file       FAMILY  "HISTORY:  Family History   Problem Relation Name Age of Onset    Colon cancer Sister      Heart disease Brother         MEDICATION LIST:  acetaminophen, 650 mg, Once  amLODIPine, 2.5 mg, Daily  atorvastatin, 20 mg, Daily  cefepime, 1 g, q12h  insulin degludec, 15 Units, q24h  insulin lispro, 0-5 Units, TID with meals  lidocaine, 1 patch, Daily  metoprolol succinate XL, 50 mg, Daily  mycophenolate, 500 mg, BID  polyethylene glycol, 17 g, Daily  rivaroxaban, 20 mg, Daily with evening meal  tacrolimus, 1 mg, q12h         acetaminophen, 650 mg, q4h PRN   Or  acetaminophen, 650 mg, q4h PRN   Or  acetaminophen, 650 mg, q4h PRN  artificial tears (dextran-hypomel-glycerin), 2 drop, q4h PRN  dextrose 10 % in water (D10W), 200 mL/hr, Once PRN  dextrose, 25 g, q15 min PRN  glucagon, 1 mg, q15 min PRN  oxyCODONE, 5 mg, q6h PRN  phenazopyridine, 100 mg, TID PRN        ALLERGY:  Allergies   Allergen Reactions    Penicillins Anaphylaxis, Hives, Shortness of breath and Swelling     swollen tongue    Ace Inhibitors Other     ESRD    Arb-Angiotensin Receptor Antagonist Other     ESRD    Influenza Tri-Split 2007 Vac Other    Oxycodone-Acetaminophen Other     \"ZONED OUT\"    Penicillin Swelling    Ceftriaxone Itching and Rash     pt endorces itching to face and abd       PHYCISCAL EXAMINATION:  Visit Vitals  /85 (BP Location: Right arm, Patient Position: Lying)   Pulse 62   Temp 36.5 °C (97.7 °F) (Oral)   Resp 15   Ht 1.803 m (5' 11\")   Wt 89.4 kg (197 lb 0.1 oz)   SpO2 96%   BMI 27.48 kg/m²   Smoking Status Former   BSA 2.12 m²        01/23 1900 - 01/25 0659  In: 100   Out: -        General Appearance - NAD, Good speech, oriented and alert  HEENT - Supple. Not pale. No jaundice. No cervical lymphadenopathy. Pharynx and tonsils are not injected.  CVS - RRR. Normal S1/S2. No murmur, click , rub or gallop  Lungs- clear to auscultation bilaterally  Abdomen - soft , not tender, no guarding, no rigidity. No hepatosplenomegaly. Normal " bowel sounds. No masses and ascites. S/P Kidney transplant .  Transplanted kidney is not tender.   Musculoskeletal /Extremities - no edema. Full ROM. No joint tenderness.   Neuro/Psych - appropriate mood and affect. Motor power V/V all extremities. CN I -XII were grossly intact.  Skin - No visible rash    LABS:  Results for orders placed or performed during the hospital encounter of 01/23/24 (from the past 24 hour(s))   POCT GLUCOSE   Result Value Ref Range    POCT Glucose 203 (H) 74 - 99 mg/dL   POCT GLUCOSE   Result Value Ref Range    POCT Glucose 197 (H) 74 - 99 mg/dL   CBC   Result Value Ref Range    WBC 7.5 4.4 - 11.3 x10*3/uL    nRBC 0.0 0.0 - 0.0 /100 WBCs    RBC 3.92 (L) 4.50 - 5.90 x10*6/uL    Hemoglobin 10.0 (L) 13.5 - 17.5 g/dL    Hematocrit 29.0 (L) 41.0 - 52.0 %    MCV 74 (L) 80 - 100 fL    MCH 25.5 (L) 26.0 - 34.0 pg    MCHC 34.5 32.0 - 36.0 g/dL    RDW 18.1 (H) 11.5 - 14.5 %    Platelets 419 150 - 450 x10*3/uL   Renal function panel   Result Value Ref Range    Glucose 152 (H) 74 - 99 mg/dL    Sodium 140 136 - 145 mmol/L    Potassium 4.0 3.5 - 5.3 mmol/L    Chloride 105 98 - 107 mmol/L    Bicarbonate 29 21 - 32 mmol/L    Anion Gap 10 10 - 20 mmol/L    Urea Nitrogen 16 6 - 23 mg/dL    Creatinine 0.90 0.50 - 1.30 mg/dL    eGFR 90 >60 mL/min/1.73m*2    Calcium 9.4 8.6 - 10.6 mg/dL    Phosphorus 2.8 2.5 - 4.9 mg/dL    Albumin 3.8 3.4 - 5.0 g/dL   Magnesium   Result Value Ref Range    Magnesium 1.53 (L) 1.60 - 2.40 mg/dL   Red Top   Result Value Ref Range    Extra Tube Hold for add-ons.    SST TOP   Result Value Ref Range    Extra Tube Hold for add-ons.    Tacrolimus level   Result Value Ref Range    Tacrolimus  10.4 <=15.0 ng/mL   POCT GLUCOSE   Result Value Ref Range    POCT Glucose 87 74 - 99 mg/dL        ASSESSMENT AND PLAN:  Mr. Schwartz is a 73 y.o. male who underwent a kidney transplant surgery on [unfilled] and was hospitalized for:    Principal Problem:    Failure to thrive in adult      1. ESRD S/P  Kidney transplant.   - Renal allograft function: GOOD    Lab Results   Component Value Date    CREATININE 0.90 01/25/2024     Serum creatinine: 0.9 mg/dL 01/25/24 0352  Estimated creatinine clearance: 77.9 mL/min    Intake/Output Summary (Last 24 hours) at 1/25/2024 1315  Last data filed at 1/25/2024 0554  Gross per 24 hour   Intake 100 ml   Output --   Net 100 ml     - Continue to monitor UOP and Serum creatinine closely.   - Avoid nephrotoxic agents, NSAIDs and IV contrast   - Strict I/O.   - Renally dose all medications by the most recent CrCl from Cockcroft-Gault formula.    2. Immunosuppression   - continue current immunosuppression   - Monitor tacrolimus trough level   -Last tacrolimus level was 10.4; first level. Will see next level tomorrow.  - BID  -Goal tacrolimus trough level is 4-6     3. Anemia and WBC   Lab Results   Component Value Date    WBC 7.5 01/25/2024    HGB 10.0 (L) 01/25/2024    HCT 29.0 (L) 01/25/2024    MCV 74 (L) 01/25/2024     01/25/2024     -Continue to monitor Hgb   -No indications for PRBC transfusion   -SEND Iron study , ferritin. REINIER prn    4. Electrolyte   Lab Results   Component Value Date    GLUCOSE 152 (H) 01/25/2024    CALCIUM 9.4 01/25/2024     01/25/2024    K 4.0 01/25/2024    CO2 29 01/25/2024     01/25/2024    BUN 16 01/25/2024    CREATININE 0.90 01/25/2024     - Reviewed renal profile.     6. Hypertension   Blood Pressures         1/25/2024  0045 1/25/2024  0327 1/25/2024  0630 1/25/2024  0748 1/25/2024  1130    BP: 183/95 192/85 167/86 191/81 177/85          - BP had been around   -Goal BP < 140/90 mmHg   -continue current management   -Would increase amlodipine to 10 mg daily    7. UTI and LBP  -On Cefepime  - Follow up culture  -Lidocaine patch  -No NSAID  - [place]er to primary team  - Please obtain US with post void residual  -send PSA    8.  GI prophylaxis   - On PPI     9. DVT Prophylaxis  -Defer to primary team        * Case was discussed with  primary team.  For questions, please contact transplant nephrology page x [phone].      Hipolito Fofana    Transplant Nephrologist

## 2024-01-25 NOTE — PROGRESS NOTES
Albert Schwartz is a 73 y.o. male on day 0 of admission presenting with Failure to thrive in adult.    Subjective   No events, eager to be transferred to floor and threatening to leave ama.  Called dtr and updated  Pt is med clear for SNF           Objective     Physical Exam  Vitals reviewed.   Constitutional:       General: He is not in acute distress.  HENT:      Mouth/Throat:      Mouth: Mucous membranes are moist.      Pharynx: Oropharynx is clear.   Eyes:      Extraocular Movements: Extraocular movements intact.      Conjunctiva/sclera: Conjunctivae normal.      Pupils: Pupils are equal, round, and reactive to light.   Cardiovascular:      Rate and Rhythm: Normal rate and regular rhythm.      Pulses: Normal pulses.      Heart sounds: Normal heart sounds. No murmur heard.     No gallop.   Pulmonary:      Effort: Pulmonary effort is normal.      Breath sounds: Normal breath sounds. No wheezing, rhonchi or rales.   Abdominal:      General: Abdomen is flat. Bowel sounds are normal. There is no distension.      Palpations: Abdomen is soft. There is no mass.      Tenderness: There is no abdominal tenderness. There is no rebound.   Musculoskeletal:         General: No swelling.      Cervical back: Normal range of motion and neck supple.      Right upper leg: Tenderness present.      Left upper leg: Normal.      Right knee: Normal.      Left knee: Normal.   Skin:     General: Skin is warm and dry.      Capillary Refill: Capillary refill takes less than 2 seconds.   Neurological:      General: No focal deficit present.      Mental Status: He is alert and oriented to person, place, and time. Mental status is at baseline.      Sensory: No sensory deficit.      Motor: Weakness present.      Gait: Gait abnormal.      Comments: Weakness and pain on hip flexion of the right thigh   Psychiatric:         Mood and Affect: Mood normal.         Behavior: Behavior normal.         Thought Content: Thought content normal.          "Judgment: Judgment normal.     Last Recorded Vitals  Blood pressure 166/86, pulse 67, temperature 36.5 °C (97.7 °F), temperature source Oral, resp. rate 15, height 1.803 m (5' 11\"), weight 89.4 kg (197 lb 0.1 oz), SpO2 94 %.  Intake/Output last 3 Shifts:  I/O last 3 completed shifts:  In: 100 (1.1 mL/kg) [IV Piggyback:100]  Out: - (0 mL/kg)   Weight: 89.4 kg     Relevant Results                             Assessment/Plan   Principal Problem:    Failure to thrive in adult  73M PMH T2DM (A1c 6.8% 12/2022), HTN, cirrhosis 2/2 alcohol use and chronic hep C, SLE, ESRD 2/2 htn and T2DM now s/p kidney transplant in 2017  on tacrolimus and MMF, BPH s/p HoLEP in 1/2023 which was c/b PE now on xarelto s/p IVC filter placement and removal, GERD and HLD who presented with LBP. Evaluation via MRI demonstrating DDD and mild canal stenosis. PT/OT evaluation demonstrating reduced strength, endurance, and balance. Recommending SNF placement. Admitted for placement     #DDD L3/4, L4/5  #Mild foraminal stenosis  :: 2-3d h/o acute onset back pain  :: Accepted to SNF for acute rehab  -Lidocaine patch     #UTI  :: UA positive for LE and large pyuria  :: No urinary symptoms currently  :: Will treat empirically iso immunosuppression  -Cefepime  [  ] F/U UCx     #HTN  #HLD  -Amlodipine 2.5mg daily  -Atorvastatin 20mg daily     #T2DM  :: A1c 6.8% 12/2022  :: Home insulin regimen: Tresiba 30u AM, 10u PM, Lispro 10u TID  [  ] F/U A1c  -Accucheck ACHS  - LDISS     #H/o PE  -On lifelong Xarelto therapy     #Liver Cirrhosis 2/2 chronic HCV infx  :: HCV treated historically with harvoni followed by relapse followed by treatment with Vocevi which cleared infection [id number]  :: MRI of liver demonstrating signs of liver cirrhosis as well as simple cysts  :: Liver function has stabilized since renal transplant in 2017  -Currently not in evaluation for liver transplant   -Monitor for signs of liver injury  -Avoid hepatotoxic medications     #ESRD s/p " kidney transplant  #H/o kidney transplant 2017  -Renal  transplant service consulted  -Daily tacrolimus levels  -Tacrolimus 1mg BID  - MMF 500mg BID     #BPH  ::S/p HoLEP  -Monitor for urinary retention     F: PRN  E: PRN  N: Carb controlled diet  A: PIVx1  DVT: Xarelto     Code Status: Full code (confirmed on admission)  NOK: Sabreen Cooks, daughter ([phone])     Yong Schwartz [phone]            Dispo: Pt has been accepted at [facility] and is awaiting his precert for him to go.                     I spent 50 minutes in the professional and overall care of this patient.      Stoney Costa MD

## 2024-01-25 NOTE — PROGRESS NOTES
Picked up patient in handover from  yesterday. [facility] [place] has accepted patient, PASRR was completed yesterday and precert by facility in progress. Care team aware. Will follow.  GRZEGORZ LOCKHART Transitional care Coordinator

## 2024-01-26 LAB
GLUCOSE BLD MANUAL STRIP-MCNC: 136 MG/DL (ref 74–99)
GLUCOSE BLD MANUAL STRIP-MCNC: 141 MG/DL (ref 74–99)
GLUCOSE BLD MANUAL STRIP-MCNC: 143 MG/DL (ref 74–99)
TACROLIMUS BLD-MCNC: 11.2 NG/ML

## 2024-01-26 PROCEDURE — 82947 ASSAY GLUCOSE BLOOD QUANT: CPT

## 2024-01-26 PROCEDURE — 36415 COLL VENOUS BLD VENIPUNCTURE: CPT | Performed by: STUDENT IN AN ORGANIZED HEALTH CARE EDUCATION/TRAINING PROGRAM

## 2024-01-26 PROCEDURE — 99233 SBSQ HOSP IP/OBS HIGH 50: CPT | Performed by: STUDENT IN AN ORGANIZED HEALTH CARE EDUCATION/TRAINING PROGRAM

## 2024-01-26 PROCEDURE — G0378 HOSPITAL OBSERVATION PER HR: HCPCS

## 2024-01-26 PROCEDURE — 2500000004 HC RX 250 GENERAL PHARMACY W/ HCPCS (ALT 636 FOR OP/ED)

## 2024-01-26 PROCEDURE — 2500000002 HC RX 250 W HCPCS SELF ADMINISTERED DRUGS (ALT 637 FOR MEDICARE OP, ALT 636 FOR OP/ED)

## 2024-01-26 PROCEDURE — 2500000001 HC RX 250 WO HCPCS SELF ADMINISTERED DRUGS (ALT 637 FOR MEDICARE OP): Performed by: STUDENT IN AN ORGANIZED HEALTH CARE EDUCATION/TRAINING PROGRAM

## 2024-01-26 PROCEDURE — 2500000001 HC RX 250 WO HCPCS SELF ADMINISTERED DRUGS (ALT 637 FOR MEDICARE OP)

## 2024-01-26 PROCEDURE — 2500000005 HC RX 250 GENERAL PHARMACY W/O HCPCS

## 2024-01-26 PROCEDURE — 80197 ASSAY OF TACROLIMUS: CPT | Performed by: STUDENT IN AN ORGANIZED HEALTH CARE EDUCATION/TRAINING PROGRAM

## 2024-01-26 PROCEDURE — 97530 THERAPEUTIC ACTIVITIES: CPT | Mod: GP

## 2024-01-26 RX ORDER — CIPROFLOXACIN 500 MG/1
250 TABLET ORAL EVERY 12 HOURS SCHEDULED
Status: DISCONTINUED | OUTPATIENT
Start: 2024-01-26 | End: 2024-01-26

## 2024-01-26 RX ORDER — CIPROFLOXACIN 500 MG/1
250 TABLET ORAL EVERY 12 HOURS SCHEDULED
Status: COMPLETED | OUTPATIENT
Start: 2024-01-26 | End: 2024-01-28

## 2024-01-26 RX ADMIN — OXYCODONE HYDROCHLORIDE 10 MG: 5 TABLET ORAL at 18:28

## 2024-01-26 RX ADMIN — ATORVASTATIN CALCIUM 20 MG: 20 TABLET, FILM COATED ORAL at 09:11

## 2024-01-26 RX ADMIN — OXYCODONE HYDROCHLORIDE 10 MG: 5 TABLET ORAL at 12:28

## 2024-01-26 RX ADMIN — MYCOPHENOLATE MOFETIL 500 MG: 250 CAPSULE ORAL at 20:57

## 2024-01-26 RX ADMIN — CIPROFLOXACIN HYDROCHLORIDE 250 MG: 500 TABLET, FILM COATED ORAL at 20:57

## 2024-01-26 RX ADMIN — POLYETHYLENE GLYCOL 3350 17 G: 17 POWDER, FOR SOLUTION ORAL at 09:11

## 2024-01-26 RX ADMIN — INSULIN DEGLUDEC INJECTION 15 UNITS: 100 INJECTION, SOLUTION SUBCUTANEOUS at 09:12

## 2024-01-26 RX ADMIN — METOPROLOL SUCCINATE 50 MG: 50 TABLET, EXTENDED RELEASE ORAL at 09:11

## 2024-01-26 RX ADMIN — ACETAMINOPHEN 650 MG: 325 TABLET ORAL at 16:06

## 2024-01-26 RX ADMIN — TACROLIMUS 1 MG: 1 CAPSULE ORAL at 20:57

## 2024-01-26 RX ADMIN — AMLODIPINE BESYLATE 10 MG: 10 TABLET ORAL at 09:11

## 2024-01-26 RX ADMIN — MYCOPHENOLATE MOFETIL 500 MG: 250 CAPSULE ORAL at 09:10

## 2024-01-26 RX ADMIN — RIVAROXABAN 20 MG: 20 TABLET, FILM COATED ORAL at 16:03

## 2024-01-26 RX ADMIN — OXYCODONE HYDROCHLORIDE 10 MG: 5 TABLET ORAL at 04:18

## 2024-01-26 RX ADMIN — ACETAMINOPHEN 650 MG: 325 TABLET ORAL at 04:18

## 2024-01-26 RX ADMIN — CEFEPIME 1 G: 1 INJECTION, SOLUTION INTRAVENOUS at 05:19

## 2024-01-26 RX ADMIN — CIPROFLOXACIN HYDROCHLORIDE 250 MG: 500 TABLET, FILM COATED ORAL at 16:03

## 2024-01-26 RX ADMIN — ACETAMINOPHEN 650 MG: 325 TABLET ORAL at 23:42

## 2024-01-26 RX ADMIN — LIDOCAINE 1 PATCH: 4 PATCH TOPICAL at 09:11

## 2024-01-26 RX ADMIN — TACROLIMUS 1 MG: 1 CAPSULE ORAL at 09:11

## 2024-01-26 RX ADMIN — OXYCODONE HYDROCHLORIDE 5 MG: 5 TABLET ORAL at 23:42

## 2024-01-26 ASSESSMENT — PAIN - FUNCTIONAL ASSESSMENT
PAIN_FUNCTIONAL_ASSESSMENT: 0-10

## 2024-01-26 ASSESSMENT — COGNITIVE AND FUNCTIONAL STATUS - GENERAL
CLIMB 3 TO 5 STEPS WITH RAILING: TOTAL
MOVING FROM LYING ON BACK TO SITTING ON SIDE OF FLAT BED WITH BEDRAILS: A LITTLE
EATING MEALS: A LITTLE
MOBILITY SCORE: 16
STANDING UP FROM CHAIR USING ARMS: A LITTLE
TOILETING: A LITTLE
MOVING TO AND FROM BED TO CHAIR: A LITTLE
DRESSING REGULAR UPPER BODY CLOTHING: A LITTLE
MOVING TO AND FROM BED TO CHAIR: A LITTLE
MOBILITY SCORE: 16
TURNING FROM BACK TO SIDE WHILE IN FLAT BAD: A LITTLE
WALKING IN HOSPITAL ROOM: A LITTLE
CLIMB 3 TO 5 STEPS WITH RAILING: TOTAL
HELP NEEDED FOR BATHING: A LITTLE
DRESSING REGULAR LOWER BODY CLOTHING: A LITTLE
MOVING FROM LYING ON BACK TO SITTING ON SIDE OF FLAT BED WITH BEDRAILS: A LITTLE
PERSONAL GROOMING: A LITTLE
WALKING IN HOSPITAL ROOM: A LITTLE
TURNING FROM BACK TO SIDE WHILE IN FLAT BAD: A LITTLE
DAILY ACTIVITIY SCORE: 18
STANDING UP FROM CHAIR USING ARMS: A LITTLE

## 2024-01-26 ASSESSMENT — PAIN SCALES - GENERAL
PAINLEVEL_OUTOF10: 3
PAINLEVEL_OUTOF10: 8
PAINLEVEL_OUTOF10: 9
PAINLEVEL_OUTOF10: 10 - WORST POSSIBLE PAIN
PAINLEVEL_OUTOF10: 8
PAINLEVEL_OUTOF10: 6
PAINLEVEL_OUTOF10: 8
PAINLEVEL_OUTOF10: 8
PAINLEVEL_OUTOF10: 10 - WORST POSSIBLE PAIN
PAINLEVEL_OUTOF10: 8

## 2024-01-26 ASSESSMENT — PAIN DESCRIPTION - ORIENTATION
ORIENTATION: RIGHT
ORIENTATION: LEFT
ORIENTATION: RIGHT

## 2024-01-26 ASSESSMENT — ACTIVITIES OF DAILY LIVING (ADL): LACK_OF_TRANSPORTATION: NO

## 2024-01-26 ASSESSMENT — PAIN DESCRIPTION - LOCATION
LOCATION: BACK

## 2024-01-26 ASSESSMENT — PAIN DESCRIPTION - DESCRIPTORS: DESCRIPTORS: ACHING

## 2024-01-26 NOTE — CARE PLAN
The patient's goals for the shift include pt will remain safe and free of all injuries    The clinical goals for the shift include pT WILL REPORT PAIN LESS THAN 5/10 THROUGHOUT THE  SHIFT

## 2024-01-26 NOTE — CARE PLAN
The patient's goals for the shift include pt will remain safe and free of all injuries    The clinical goals for the shift include Patients pain in the right lower back will be managed    Over the shift, the patient did not make progress toward the following goals. Barriers to progression include continued pain in the right lower back. Recommendations to address these barriers include continue to medicate with PRN medication and Lidocaine patches. To maintain safety patient has been notified to call before getting OOB, bed alarm, and hourly rounding.      Problem: Diabetes  Goal: Achieve decreasing blood glucose levels by end of shift  Outcome: Progressing  Goal: Increase stability of blood glucose readings by end of shift  Outcome: Progressing  Goal: Decrease in ketones present in urine by end of shift  Outcome: Progressing  Goal: Maintain electrolyte levels within acceptable range throughout shift  Outcome: Progressing  Goal: Maintain glucose levels >70mg/dl to <250mg/dl throughout shift  Outcome: Progressing  Goal: No changes in neurological exam by end of shift  Outcome: Progressing  Goal: Learn about and adhere to nutrition recommendations by end of shift  Outcome: Progressing  Goal: Vital signs within normal range for age by end of shift  Outcome: Progressing  Goal: Increase self care and/or family involovement by end of shift  Outcome: Progressing  Goal: Receive DSME education by end of shift  Outcome: Progressing

## 2024-01-26 NOTE — PROGRESS NOTES
Albert Schwartz is a 73 y.o. male on day 0 of admission presenting with Failure to thrive in adult.    Subjective   No events    urine culture resulted as normal beba, switched cefepime to Ciprofloxacin 250mg BID for  3 days more    No precert yet, TCC did escalate it this morning     Pt is med clear for SNF           Objective     Physical Exam  Vitals reviewed.   Constitutional:       General: He is not in acute distress.  HENT:      Mouth/Throat:      Mouth: Mucous membranes are moist.      Pharynx: Oropharynx is clear.   Eyes:      Extraocular Movements: Extraocular movements intact.      Conjunctiva/sclera: Conjunctivae normal.      Pupils: Pupils are equal, round, and reactive to light.   Cardiovascular:      Rate and Rhythm: Normal rate and regular rhythm.      Pulses: Normal pulses.      Heart sounds: Normal heart sounds. No murmur heard.     No gallop.   Pulmonary:      Effort: Pulmonary effort is normal.      Breath sounds: Normal breath sounds. No wheezing, rhonchi or rales.   Abdominal:      General: Abdomen is flat. Bowel sounds are normal. There is no distension.      Palpations: Abdomen is soft. There is no mass.      Tenderness: There is no abdominal tenderness. There is no rebound.   Musculoskeletal:         General: No swelling.      Cervical back: Normal range of motion and neck supple.      Right upper leg: Tenderness present.      Left upper leg: Normal.      Right knee: Normal.      Left knee: Normal.   Skin:     General: Skin is warm and dry.      Capillary Refill: Capillary refill takes less than 2 seconds.   Neurological:      General: No focal deficit present.      Mental Status: He is alert and oriented to person, place, and time. Mental status is at baseline.      Sensory: No sensory deficit.      Motor: Weakness present.      Gait: Gait abnormal.      Comments: Weakness and pain on hip flexion of the right thigh   Psychiatric:         Mood and Affect: Mood normal.         Behavior:  Pt's mother given discharge paperwork by RN, pt's daughter verbalizes understanding, pt ambulatory out of ED w/ mother. "Behavior normal.         Thought Content: Thought content normal.         Judgment: Judgment normal.     Last Recorded Vitals  Blood pressure 121/53, pulse 63, temperature 37 °C (98.6 °F), resp. rate 16, height 1.803 m (5' 11\"), weight 89.4 kg (197 lb 0.1 oz), SpO2 (!) 89 %.  Intake/Output last 3 Shifts:  I/O last 3 completed shifts:  In: 150 (1.7 mL/kg) [IV Piggyback:150]  Out: - (0 mL/kg)   Weight: 89.4 kg     Relevant Results                             Assessment/Plan   Principal Problem:    Failure to thrive in adult  73M PMH T2DM (A1c 6.8% 12/2022), HTN, cirrhosis 2/2 alcohol use and chronic hep C, SLE, ESRD 2/2 htn and T2DM now s/p kidney transplant in 2017  on tacrolimus and MMF, BPH s/p HoLEP in 1/2023 which was c/b PE now on xarelto s/p IVC filter placement and removal, GERD and HLD who presented with LBP. Evaluation via MRI demonstrating DDD and mild canal stenosis. PT/OT evaluation demonstrating reduced strength, endurance, and balance. Recommending SNF placement. Admitted for placement     #DDD L3/4, L4/5  #Mild foraminal stenosis   #UTI    #HTN  #HLD   #T2DM   #H/o PE  -On lifelong Xarelto therapy     #Liver Cirrhosis 2/2 chronic HCV infx  :: HCV treated historically with harvoni followed by relapse followed by treatment with Vocevi which cleared infection [id number]  :: MRI of liver demonstrating signs of liver cirrhosis as well as simple cysts  :: Liver function has stabilized since renal transplant in 2017  -Currently not in evaluation for liver transplant   -Monitor for signs of liver injury  -Avoid hepatotoxic medications     #ESRD s/p kidney transplant  #H/o kidney transplant 2017  -Renal  transplant service consulted  -Daily tacrolimus levels  -Tacrolimus 1mg BID  -MMF 500mg BID     #BPH  ::S/p HoLEP  -Monitor for urinary retention     F: PRN  E: PRN  N: Carb controlled diet  A: PIVx1  DVT: Xarelto     Code Status: Full code (confirmed on admission)  NOK: Sabreen Cooks, daughter ([phone])   "   Yong Schwartz [phone]            Dispo: Pt has been accepted at [facility] and is awaiting his precert for him to go.                     I spent 50 minutes in the professional and overall care of this patient.      Stoney Costa MD

## 2024-01-26 NOTE — PROGRESS NOTES
01/26/24 1020   Discharge Planning   Living Arrangements Children   Support Systems Children;Family members   Assistance Needed assist for bathing   Type of Residence Private residence   Number of Stairs to Enter Residence 7   Number of Stairs Within Residence 14   Do you have animals or pets at home? No   Home or Post Acute Services Post acute facilities (Rehab/SNF/etc)   Type of Post Acute Facility Services Skilled nursing   Patient expects to be discharged to: [facility]   Does the patient need discharge transport arranged? Yes   RoundTrip coordination needed? Yes   Has discharge transport been arranged? No   Financial Resource Strain   How hard is it for you to pay for the very basics like food, housing, medical care, and heating? Somewhat   Housing Stability   In the last 12 months, was there a time when you were not able to pay the mortgage or rent on time? N   In the last 12 months, how many places have you lived? 1   In the last 12 months, was there a time when you did not have a steady place to sleep or slept in a shelter (including now)? N   Transportation Needs   In the past 12 months, has lack of transportation kept you from medical appointments or from getting medications? no   In the past 12 months, has lack of transportation kept you from meetings, work, or from getting things needed for daily living? No   Patient Choice   Provider Choice list and CMS website (https://medicare.gov/care-compare#search) for post-acute Quality and Resource Measure Data were provided and reviewed with: Patient   Patient / Family choosing to utilize agency / facility established prior to hospitalization Yes     Transitional Care Coordination Progress Note:  Patient discussed during interdisciplinary rounds.   Team members present: ANJANA DA SILVA  Plan per Medical/Surgical team: Failure to thrive  Payor: Ryan  Discharge disposition: [facility]  Potential Barriers: insurance authorization  ADOD: 1-2  days    Previous Home Care: NA  DME: walker, grab bars  Pharmacy: [facility]  Falls: Denies  PCP:  Dr Kendall Zurita, last there November '23  Met with patient at bedside, provided introduction of self and role. Patient states he lives at home with daughter and Grandson. Patient states he requires assist for bathing, grandson assists. Patient states he feels safe at home. Patient states daughter provides transport to appointments. Patient states no concerns obtaining/affording medications. Patient states he is having difficulty with rent, utilities and getting food in the home. Direct submit team asked to escalate precert. Will continue to monitor for discharge planning needs.     Ruthy MATHIS, RN  Transitional Care Coordinator (TCC)  [phone]

## 2024-01-26 NOTE — PROGRESS NOTES
Physical Therapy    Physical Therapy Treatment    Patient Name: Albert Schwartz  MRN: [id number]  Today's Date: 1/26/2024  Time Calculation  Start Time: 0837  Stop Time: 0902  Time Calculation (min): 25 min       Assessment/Plan   PT Assessment  PT Assessment Results: Decreased strength, Decreased endurance, Impaired balance, Decreased mobility, Pain, Impaired vision  End of Session Communication: Bedside nurse  Assessment Comment: Pt demos need for assistance with functional mobility, will benefit from continued work with PT to address all mobility deficits.  End of Session Patient Position: Bed, 3 rail up, Alarm off, caregiver present     PT Discharge Recommendations: Moderate intensity level of continued care     01/26/24 0837   PT  Visit   PT Received On 01/26/24   General   General Comment Pt happy to be out of the ED, feels his pain is improved but still mildly there.  Was up with nursing staff this morning, currently performing oral care seated at EOB upon PT entry.  Completes transfers and gait activities with min <-> CGA.  Unsteady with gait, though pt blames this on poor vision.  Will continue to follow.   Precautions   Hearing/Visual Limitations reports depending on lighting he can see details at times,  otherwise mostly shadows   Medical Precautions Fall precautions   Pain Assessment   Pain Assessment 0-10   Pain Score 8   Pain Location Back   Pain Orientation Lower   Cognition   Arousal/Alertness Appropriate responses to stimuli   Orientation Level Oriented X4   Static Sitting Balance   Static Sitting-Balance Support Feet supported   Static Sitting-Level of Assistance Distant supervision   Static Standing Balance   Static Standing-Balance Support Bilateral upper extremity supported   Static Standing-Level of Assistance Contact guard   Static Standing-Comment/Number of Minutes with standard walker   Dynamic Standing Balance   Dynamic Standing-Balance Support Bilateral upper extremity supported   Dynamic  Standing-Comments min Ax1 with standard walker   Therapeutic Exercise   Therapeutic Exercise Performed Yes   Therapeutic Exercise Activity 1 (B) LE LAQ x12   Therapeutic Exercise Activity 2 (B) LE hip flex x12   Therapeutic Activity   Therapeutic Activity Performed Yes   Therapeutic Activity 1 completes teeth brushing and use of mouth wash while seated at EOB, insists on bending forward to spit into garbage can instead of basin   Bed Mobility   Bed Mobility Yes   Bed Mobility 1   Bed Mobility 1 Sitting to supine   Level of Assistance 1 Close supervision   Bed Mobility Comments 1 increased time and effort to complete, pt lifts (R) LE by pant leg to bring fully into bed   Bed Mobility 2   Bed Mobility  2 Scooting  (laterally)   Level of Assistance 2 Contact guard;Moderate verbal cues   Bed Mobility Comments 2 completes x4 as pt sat at end  of bed despite cues to side step to achieve higher position   Ambulation/Gait Training   Ambulation/Gait Training Performed Yes   Ambulation/Gait Training 1   Surface 1 Level tile   Device 1 Standard walker   Assistance 1 Minimum assistance;Maximum verbal cues  (x1)   Quality of Gait 1 Inconsistent stride length;Soft knee(s)  (hold walker too advanced, does not step within framework despite cues, verbal cues for direction and object avoidance due to visual deficits)   Comments/Distance (ft) 1 8 ft   Transfers   Transfer Yes   Transfer 1   Transfer From 1 Bed to   Transfer to 1 Stand   Transfer Level of Assistance 1 Contact guard;Moderate verbal cues   Trials/Comments 1 increased time to stand fully upright   Transfers 2   Transfer From 2 Stand to   Transfer to 2 Bed   Transfer Level of Assistance 2 Contact guard;Moderate verbal cues   Activity Tolerance   Endurance Tolerates 10 - 20 min exercise with multiple rests   Activity Tolerance Comments limited upright standing tolerance at this time   PT Assessment   PT Assessment Results Decreased strength;Decreased endurance;Impaired  balance;Decreased mobility;Pain;Impaired vision   End of Session Communication Bedside nurse   Assessment Comment Pt demos need for assistance with functional mobility, will benefit from continued work with PT to address all mobility deficits.   End of Session Patient Position Bed, 3 rail up;Alarm off, caregiver present     Outcome Measures:  [facility] Basic Mobility  Turning from your back to your side while in a flat bed without using bedrails: A little  Moving from lying on your back to sitting on the side of a flat bed without using bedrails: A little  Moving to and from bed to chair (including a wheelchair): A little  Standing up from a chair using your arms (e.g. wheelchair or bedside chair): A little  To walk in hospital room: A little  Climbing 3-5 steps with railing: Total  Basic Mobility - Total Score: 16                       EDUCATION:     Education Documentation  Body Mechanics, taught by Izabela Keith, PT at 1/26/2024  9:27 AM.  Learner: Patient  Readiness: Acceptance  Method: Explanation  Response: Verbalizes Understanding, Needs Reinforcement  Comment: body mechanics to protect back/spine    Mobility Training, taught by Izabela Keith, PT at 1/26/2024  9:27 AM.  Learner: Patient  Readiness: Acceptance  Method: Explanation  Response: Verbalizes Understanding, Needs Reinforcement  Comment: body mechanics to protect back/spine    GOALS:  Encounter Problems       Encounter Problems (Active)       Mobility       STG - Patient will ambulate >100' with WW and CGA (Progressing)       Start:  01/24/24    Expected End:  02/07/24               Transfers       STG - Patient to transfer to and from sit to supine indep from flat bed without rails via logroll (Progressing)       Start:  01/24/24    Expected End:  02/07/24            STG - Patient will transfer sit to and from stand CGA and WW (Met)       Start:  01/24/24    Expected End:  02/07/24    Resolved:  01/26/24    Updated to: STG - Patient will  transfer sit to and from stand distant supervision and WW    Update reason: met         STG - Patient will transfer sit to and from stand distant supervision and WW (Progressing)       Start:  01/26/24    Expected End:  02/07/24 01/26/24 at 9:29 AM - Izabela Keith, PT

## 2024-01-26 NOTE — CARE PLAN
CHW spoke to patient and daughter Jenna [phone]. Family both declined any resources/assistance at this time. CHW called regarding rent,utilities, and getting food. Patient/Family declined all needs

## 2024-01-27 LAB
GLUCOSE BLD MANUAL STRIP-MCNC: 108 MG/DL (ref 74–99)
GLUCOSE BLD MANUAL STRIP-MCNC: 130 MG/DL (ref 74–99)
GLUCOSE BLD MANUAL STRIP-MCNC: 141 MG/DL (ref 74–99)
GLUCOSE BLD MANUAL STRIP-MCNC: 81 MG/DL (ref 74–99)

## 2024-01-27 PROCEDURE — G0378 HOSPITAL OBSERVATION PER HR: HCPCS

## 2024-01-27 PROCEDURE — 82947 ASSAY GLUCOSE BLOOD QUANT: CPT

## 2024-01-27 PROCEDURE — 2500000001 HC RX 250 WO HCPCS SELF ADMINISTERED DRUGS (ALT 637 FOR MEDICARE OP)

## 2024-01-27 PROCEDURE — 2500000005 HC RX 250 GENERAL PHARMACY W/O HCPCS

## 2024-01-27 PROCEDURE — 2500000004 HC RX 250 GENERAL PHARMACY W/ HCPCS (ALT 636 FOR OP/ED): Performed by: STUDENT IN AN ORGANIZED HEALTH CARE EDUCATION/TRAINING PROGRAM

## 2024-01-27 PROCEDURE — 2500000004 HC RX 250 GENERAL PHARMACY W/ HCPCS (ALT 636 FOR OP/ED)

## 2024-01-27 PROCEDURE — 2500000001 HC RX 250 WO HCPCS SELF ADMINISTERED DRUGS (ALT 637 FOR MEDICARE OP): Performed by: STUDENT IN AN ORGANIZED HEALTH CARE EDUCATION/TRAINING PROGRAM

## 2024-01-27 PROCEDURE — 99233 SBSQ HOSP IP/OBS HIGH 50: CPT | Performed by: STUDENT IN AN ORGANIZED HEALTH CARE EDUCATION/TRAINING PROGRAM

## 2024-01-27 RX ORDER — POLYETHYLENE GLYCOL 3350 17 G/17G
17 POWDER, FOR SOLUTION ORAL 3 TIMES DAILY PRN
Status: DISCONTINUED | OUTPATIENT
Start: 2024-01-27 | End: 2024-01-29 | Stop reason: HOSPADM

## 2024-01-27 RX ADMIN — OXYCODONE HYDROCHLORIDE 10 MG: 5 TABLET ORAL at 05:38

## 2024-01-27 RX ADMIN — METOPROLOL SUCCINATE 50 MG: 50 TABLET, EXTENDED RELEASE ORAL at 09:31

## 2024-01-27 RX ADMIN — LIDOCAINE 1 PATCH: 4 PATCH TOPICAL at 09:30

## 2024-01-27 RX ADMIN — AMLODIPINE BESYLATE 10 MG: 10 TABLET ORAL at 09:31

## 2024-01-27 RX ADMIN — CIPROFLOXACIN HYDROCHLORIDE 250 MG: 500 TABLET, FILM COATED ORAL at 09:31

## 2024-01-27 RX ADMIN — MYCOPHENOLATE MOFETIL 500 MG: 250 CAPSULE ORAL at 09:31

## 2024-01-27 RX ADMIN — RIVAROXABAN 20 MG: 20 TABLET, FILM COATED ORAL at 17:54

## 2024-01-27 RX ADMIN — MYCOPHENOLATE MOFETIL 500 MG: 250 CAPSULE ORAL at 20:36

## 2024-01-27 RX ADMIN — POLYETHYLENE GLYCOL 3350 17 G: 17 POWDER, FOR SOLUTION ORAL at 16:36

## 2024-01-27 RX ADMIN — TACROLIMUS 1 MG: 1 CAPSULE ORAL at 20:36

## 2024-01-27 RX ADMIN — TACROLIMUS 1 MG: 1 CAPSULE ORAL at 09:31

## 2024-01-27 RX ADMIN — OXYCODONE HYDROCHLORIDE 10 MG: 5 TABLET ORAL at 14:43

## 2024-01-27 RX ADMIN — ATORVASTATIN CALCIUM 20 MG: 20 TABLET, FILM COATED ORAL at 09:31

## 2024-01-27 RX ADMIN — CIPROFLOXACIN HYDROCHLORIDE 250 MG: 500 TABLET, FILM COATED ORAL at 20:36

## 2024-01-27 RX ADMIN — POLYETHYLENE GLYCOL 3350 17 G: 17 POWDER, FOR SOLUTION ORAL at 09:31

## 2024-01-27 ASSESSMENT — PAIN SCALES - GENERAL
PAINLEVEL_OUTOF10: 8
PAINLEVEL_OUTOF10: 5 - MODERATE PAIN
PAINLEVEL_OUTOF10: 10 - WORST POSSIBLE PAIN
PAINLEVEL_OUTOF10: 10 - WORST POSSIBLE PAIN

## 2024-01-27 ASSESSMENT — COGNITIVE AND FUNCTIONAL STATUS - GENERAL
CLIMB 3 TO 5 STEPS WITH RAILING: A LITTLE
DRESSING REGULAR LOWER BODY CLOTHING: A LITTLE
TOILETING: A LITTLE
HELP NEEDED FOR BATHING: A LITTLE
DAILY ACTIVITIY SCORE: 18
DRESSING REGULAR UPPER BODY CLOTHING: A LITTLE
MOBILITY SCORE: 18
STANDING UP FROM CHAIR USING ARMS: A LITTLE
PERSONAL GROOMING: A LITTLE
MOBILITY SCORE: 16
EATING MEALS: A LITTLE
TOILETING: A LITTLE
WALKING IN HOSPITAL ROOM: A LITTLE
CLIMB 3 TO 5 STEPS WITH RAILING: TOTAL
TURNING FROM BACK TO SIDE WHILE IN FLAT BAD: A LITTLE
PERSONAL GROOMING: A LITTLE
HELP NEEDED FOR BATHING: A LITTLE
DRESSING REGULAR UPPER BODY CLOTHING: A LITTLE
STANDING UP FROM CHAIR USING ARMS: A LITTLE
WALKING IN HOSPITAL ROOM: A LITTLE
TURNING FROM BACK TO SIDE WHILE IN FLAT BAD: A LITTLE
DAILY ACTIVITIY SCORE: 18
DAILY ACTIVITIY SCORE: 18
EATING MEALS: A LITTLE
MOBILITY SCORE: 16
MOVING FROM LYING ON BACK TO SITTING ON SIDE OF FLAT BED WITH BEDRAILS: A LITTLE
WALKING IN HOSPITAL ROOM: A LITTLE
CLIMB 3 TO 5 STEPS WITH RAILING: TOTAL
EATING MEALS: A LITTLE
DRESSING REGULAR UPPER BODY CLOTHING: A LITTLE
TOILETING: A LITTLE
PERSONAL GROOMING: A LITTLE
TURNING FROM BACK TO SIDE WHILE IN FLAT BAD: A LITTLE
STANDING UP FROM CHAIR USING ARMS: A LITTLE
MOVING FROM LYING ON BACK TO SITTING ON SIDE OF FLAT BED WITH BEDRAILS: A LITTLE
MOVING TO AND FROM BED TO CHAIR: A LITTLE
HELP NEEDED FOR BATHING: A LITTLE
DRESSING REGULAR LOWER BODY CLOTHING: A LITTLE
DRESSING REGULAR LOWER BODY CLOTHING: A LITTLE
MOVING FROM LYING ON BACK TO SITTING ON SIDE OF FLAT BED WITH BEDRAILS: A LITTLE
MOVING TO AND FROM BED TO CHAIR: A LITTLE
MOVING TO AND FROM BED TO CHAIR: A LITTLE

## 2024-01-27 ASSESSMENT — PAIN - FUNCTIONAL ASSESSMENT
PAIN_FUNCTIONAL_ASSESSMENT: 0-10

## 2024-01-27 ASSESSMENT — PAIN DESCRIPTION - LOCATION
LOCATION: BACK
LOCATION: BACK

## 2024-01-27 ASSESSMENT — PAIN DESCRIPTION - ORIENTATION: ORIENTATION: LOWER

## 2024-01-27 NOTE — CARE PLAN
The patient's goals for the shift include pt. will have a bowel movement    The clinical goals for the shift include pt. will have decreased pain to lower back    Over the shift, the patient did not make progress toward the following goals. Barriers to progression includes no Bowel movement for the day. Recommendations to address these barriers include spoke with pt. Regarding pain regiment and lidocaine patch for pain.

## 2024-01-27 NOTE — PROGRESS NOTES
Albert Schwartz is a 73 y.o. male on day 0 of admission presenting with Failure to thrive in adult.    Subjective   Pt was asking for something else for BM, he had miralax earlier with only small BM  Increased miralax to TID PRN, will reassess later.         urine culture resulted as normal beba, switched cefepime to Ciprofloxacin 250mg BID for  3 days more    No precert yet, TCC did escalate it this morning     Pt is med clear for SNF           Objective     Physical Exam  Vitals reviewed.   Constitutional:       General: He is not in acute distress.  HENT:      Mouth/Throat:      Mouth: Mucous membranes are moist.      Pharynx: Oropharynx is clear.   Eyes:      Extraocular Movements: Extraocular movements intact.      Conjunctiva/sclera: Conjunctivae normal.      Pupils: Pupils are equal, round, and reactive to light.   Cardiovascular:      Rate and Rhythm: Normal rate and regular rhythm.      Pulses: Normal pulses.      Heart sounds: Normal heart sounds. No murmur heard.     No gallop.   Pulmonary:      Effort: Pulmonary effort is normal.      Breath sounds: Normal breath sounds. No wheezing, rhonchi or rales.   Abdominal:      General: Abdomen is flat. Bowel sounds are normal. There is no distension.      Palpations: Abdomen is soft. There is no mass.      Tenderness: There is no abdominal tenderness. There is no rebound.   Musculoskeletal:         General: No swelling.      Cervical back: Normal range of motion and neck supple.      Right upper leg: Tenderness present.      Left upper leg: Normal.      Right knee: Normal.      Left knee: Normal.   Skin:     General: Skin is warm and dry.      Capillary Refill: Capillary refill takes less than 2 seconds.   Neurological:      General: No focal deficit present.      Mental Status: He is alert and oriented to person, place, and time. Mental status is at baseline.      Sensory: No sensory deficit.      Motor: Weakness present.      Gait: Gait abnormal.      Comments:  "Weakness and pain on hip flexion of the right thigh   Psychiatric:         Mood and Affect: Mood normal.         Behavior: Behavior normal.         Thought Content: Thought content normal.         Judgment: Judgment normal.     Last Recorded Vitals  Blood pressure 139/73, pulse 66, temperature 36.9 °C (98.4 °F), resp. rate 18, height 1.803 m (5' 11\"), weight 89.4 kg (197 lb 0.1 oz), SpO2 98 %.  Intake/Output last 3 Shifts:  No intake/output data recorded.    Relevant Results                             Assessment/Plan   Principal Problem:    Failure to thrive in adult  73M PMH T2DM (A1c 6.8% 12/2022), HTN, cirrhosis 2/2 alcohol use and chronic hep C, SLE, ESRD 2/2 htn and T2DM now s/p kidney transplant in 2017  on tacrolimus and MMF, BPH s/p HoLEP in 1/2023 which was c/b PE now on xarelto s/p IVC filter placement and removal, GERD and HLD who presented with LBP. Evaluation via MRI demonstrating DDD and mild canal stenosis. PT/OT evaluation demonstrating reduced strength, endurance, and balance. Recommending SNF placement. Admitted for placement     #DDD L3/4, L4/5  #Mild foraminal stenosis   #UTI    #HTN  #HLD   #T2DM   #H/o PE  -On lifelong Xarelto therapy     #Liver Cirrhosis 2/2 chronic HCV infx  :: HCV treated historically with harvoni followed by relapse followed by treatment with Vocevi which cleared infection [id number]  :: MRI of liver demonstrating signs of liver cirrhosis as well as simple cysts  :: Liver function has stabilized since renal transplant in 2017  -Currently not in evaluation for liver transplant   -Monitor for signs of liver injury  -Avoid hepatotoxic medications     #ESRD s/p kidney transplant  #H/o kidney transplant 2017  -Renal  transplant service consulted  -Daily tacrolimus levels  -Tacrolimus 1mg BID  -MMF 500mg BID     #BPH  ::S/p HoLEP  -Monitor for urinary retention     F: PRN  E: PRN  N: Carb controlled diet  A: PIVx1  DVT: Xarelto     Code Status: Full code (confirmed on " admission)  NOK: Sabreen Cooks, daughter ([phone])     Yong Scwhartz [phone]            Dispo: Pt has been accepted at [facility] and is awaiting his precert for him to go.                     I spent 50 minutes in the professional and overall care of this patient.      Stoney Costa MD

## 2024-01-27 NOTE — CARE PLAN
The patient's goals for the shift include pt will remain safe and free of all injuries    The clinical goals for the shift include Patients pain in the right lower back will be managed       <-------- Click here to INCLUDE CoVID-19 Discharge Instructions

## 2024-01-28 VITALS
HEIGHT: 71 IN | BODY MASS INDEX: 27.58 KG/M2 | RESPIRATION RATE: 23 BRPM | TEMPERATURE: 98.4 F | WEIGHT: 197 LBS | HEART RATE: 61 BPM | OXYGEN SATURATION: 93 % | SYSTOLIC BLOOD PRESSURE: 116 MMHG | DIASTOLIC BLOOD PRESSURE: 55 MMHG

## 2024-01-28 LAB
ALBUMIN SERPL BCP-MCNC: 3.7 G/DL (ref 3.4–5)
ANION GAP SERPL CALC-SCNC: 13 MMOL/L (ref 10–20)
BUN SERPL-MCNC: 17 MG/DL (ref 6–23)
CALCIUM SERPL-MCNC: 9.3 MG/DL (ref 8.6–10.6)
CHLORIDE SERPL-SCNC: 104 MMOL/L (ref 98–107)
CO2 SERPL-SCNC: 25 MMOL/L (ref 21–32)
CREAT SERPL-MCNC: 1.01 MG/DL (ref 0.5–1.3)
EGFRCR SERPLBLD CKD-EPI 2021: 79 ML/MIN/1.73M*2
ERYTHROCYTE [DISTWIDTH] IN BLOOD BY AUTOMATED COUNT: 18.5 % (ref 11.5–14.5)
GLUCOSE BLD MANUAL STRIP-MCNC: 100 MG/DL (ref 74–99)
GLUCOSE BLD MANUAL STRIP-MCNC: 102 MG/DL (ref 74–99)
GLUCOSE BLD MANUAL STRIP-MCNC: 105 MG/DL (ref 74–99)
GLUCOSE BLD MANUAL STRIP-MCNC: 121 MG/DL (ref 74–99)
GLUCOSE BLD MANUAL STRIP-MCNC: 141 MG/DL (ref 74–99)
GLUCOSE BLD MANUAL STRIP-MCNC: 162 MG/DL (ref 74–99)
GLUCOSE BLD MANUAL STRIP-MCNC: 99 MG/DL (ref 74–99)
GLUCOSE SERPL-MCNC: 102 MG/DL (ref 74–99)
HCT VFR BLD AUTO: 28.4 % (ref 41–52)
HGB BLD-MCNC: 9.7 G/DL (ref 13.5–17.5)
MCH RBC QN AUTO: 25.9 PG (ref 26–34)
MCHC RBC AUTO-ENTMCNC: 34.2 G/DL (ref 32–36)
MCV RBC AUTO: 76 FL (ref 80–100)
NRBC BLD-RTO: 0 /100 WBCS (ref 0–0)
PHOSPHATE SERPL-MCNC: 2.9 MG/DL (ref 2.5–4.9)
PLATELET # BLD AUTO: 406 X10*3/UL (ref 150–450)
POTASSIUM SERPL-SCNC: 4.3 MMOL/L (ref 3.5–5.3)
RBC # BLD AUTO: 3.74 X10*6/UL (ref 4.5–5.9)
SODIUM SERPL-SCNC: 138 MMOL/L (ref 136–145)
TACROLIMUS BLD-MCNC: 12.4 NG/ML
TACROLIMUS BLD-MCNC: 12.5 NG/ML
WBC # BLD AUTO: 6.4 X10*3/UL (ref 4.4–11.3)

## 2024-01-28 PROCEDURE — 80197 ASSAY OF TACROLIMUS: CPT | Performed by: INTERNAL MEDICINE

## 2024-01-28 PROCEDURE — 99239 HOSP IP/OBS DSCHRG MGMT >30: CPT | Performed by: STUDENT IN AN ORGANIZED HEALTH CARE EDUCATION/TRAINING PROGRAM

## 2024-01-28 PROCEDURE — 85027 COMPLETE CBC AUTOMATED: CPT | Performed by: STUDENT IN AN ORGANIZED HEALTH CARE EDUCATION/TRAINING PROGRAM

## 2024-01-28 PROCEDURE — 2500000005 HC RX 250 GENERAL PHARMACY W/O HCPCS

## 2024-01-28 PROCEDURE — 36415 COLL VENOUS BLD VENIPUNCTURE: CPT | Performed by: STUDENT IN AN ORGANIZED HEALTH CARE EDUCATION/TRAINING PROGRAM

## 2024-01-28 PROCEDURE — 2500000004 HC RX 250 GENERAL PHARMACY W/ HCPCS (ALT 636 FOR OP/ED): Performed by: INTERNAL MEDICINE

## 2024-01-28 PROCEDURE — G0378 HOSPITAL OBSERVATION PER HR: HCPCS

## 2024-01-28 PROCEDURE — 82947 ASSAY GLUCOSE BLOOD QUANT: CPT

## 2024-01-28 PROCEDURE — 2500000004 HC RX 250 GENERAL PHARMACY W/ HCPCS (ALT 636 FOR OP/ED)

## 2024-01-28 PROCEDURE — 2500000001 HC RX 250 WO HCPCS SELF ADMINISTERED DRUGS (ALT 637 FOR MEDICARE OP)

## 2024-01-28 PROCEDURE — 2500000001 HC RX 250 WO HCPCS SELF ADMINISTERED DRUGS (ALT 637 FOR MEDICARE OP): Performed by: STUDENT IN AN ORGANIZED HEALTH CARE EDUCATION/TRAINING PROGRAM

## 2024-01-28 PROCEDURE — 2500000004 HC RX 250 GENERAL PHARMACY W/ HCPCS (ALT 636 FOR OP/ED): Performed by: STUDENT IN AN ORGANIZED HEALTH CARE EDUCATION/TRAINING PROGRAM

## 2024-01-28 PROCEDURE — 80069 RENAL FUNCTION PANEL: CPT | Performed by: STUDENT IN AN ORGANIZED HEALTH CARE EDUCATION/TRAINING PROGRAM

## 2024-01-28 RX ORDER — CIPROFLOXACIN 250 MG/1
250 TABLET, FILM COATED ORAL EVERY 12 HOURS SCHEDULED
Qty: 1 TABLET | Refills: 0
Start: 2024-01-28 | End: 2024-01-29

## 2024-01-28 RX ORDER — POLYETHYLENE GLYCOL 3350 17 G/17G
17 POWDER, FOR SOLUTION ORAL 3 TIMES DAILY PRN
Qty: 30 EACH | Refills: 0
Start: 2024-01-28 | End: 2024-02-27

## 2024-01-28 RX ORDER — TACROLIMUS 0.5 MG/1
0.5 CAPSULE ORAL EVERY 12 HOURS
Status: DISCONTINUED | OUTPATIENT
Start: 2024-01-28 | End: 2024-01-29 | Stop reason: HOSPADM

## 2024-01-28 RX ADMIN — AMLODIPINE BESYLATE 10 MG: 10 TABLET ORAL at 08:52

## 2024-01-28 RX ADMIN — ACETAMINOPHEN 650 MG: 325 TABLET ORAL at 15:14

## 2024-01-28 RX ADMIN — POLYETHYLENE GLYCOL 3350 17 G: 17 POWDER, FOR SOLUTION ORAL at 15:08

## 2024-01-28 RX ADMIN — RIVAROXABAN 20 MG: 20 TABLET, FILM COATED ORAL at 17:22

## 2024-01-28 RX ADMIN — OXYCODONE HYDROCHLORIDE 10 MG: 5 TABLET ORAL at 06:26

## 2024-01-28 RX ADMIN — OXYCODONE HYDROCHLORIDE 10 MG: 5 TABLET ORAL at 15:14

## 2024-01-28 RX ADMIN — TACROLIMUS 1 MG: 1 CAPSULE ORAL at 08:00

## 2024-01-28 RX ADMIN — LIDOCAINE 1 PATCH: 4 PATCH TOPICAL at 08:53

## 2024-01-28 RX ADMIN — POLYETHYLENE GLYCOL 3350 17 G: 17 POWDER, FOR SOLUTION ORAL at 08:52

## 2024-01-28 RX ADMIN — CIPROFLOXACIN HYDROCHLORIDE 250 MG: 500 TABLET, FILM COATED ORAL at 08:52

## 2024-01-28 RX ADMIN — MYCOPHENOLATE MOFETIL 500 MG: 250 CAPSULE ORAL at 20:04

## 2024-01-28 RX ADMIN — CIPROFLOXACIN HYDROCHLORIDE 250 MG: 500 TABLET, FILM COATED ORAL at 20:04

## 2024-01-28 RX ADMIN — OXYCODONE HYDROCHLORIDE 10 MG: 5 TABLET ORAL at 00:30

## 2024-01-28 RX ADMIN — MYCOPHENOLATE MOFETIL 500 MG: 250 CAPSULE ORAL at 08:53

## 2024-01-28 RX ADMIN — TACROLIMUS 0.5 MG: 0.5 CAPSULE ORAL at 20:04

## 2024-01-28 RX ADMIN — ATORVASTATIN CALCIUM 20 MG: 20 TABLET, FILM COATED ORAL at 09:00

## 2024-01-28 RX ADMIN — METOPROLOL SUCCINATE 50 MG: 50 TABLET, EXTENDED RELEASE ORAL at 08:52

## 2024-01-28 ASSESSMENT — PAIN DESCRIPTION - LOCATION
LOCATION: BACK
LOCATION: BACK

## 2024-01-28 ASSESSMENT — COGNITIVE AND FUNCTIONAL STATUS - GENERAL
HELP NEEDED FOR BATHING: A LITTLE
MOBILITY SCORE: 24
DAILY ACTIVITIY SCORE: 18
MOVING FROM LYING ON BACK TO SITTING ON SIDE OF FLAT BED WITH BEDRAILS: A LITTLE
MOBILITY SCORE: 22
CLIMB 3 TO 5 STEPS WITH RAILING: A LITTLE
DRESSING REGULAR UPPER BODY CLOTHING: A LITTLE
DRESSING REGULAR LOWER BODY CLOTHING: A LITTLE
PERSONAL GROOMING: A LITTLE
EATING MEALS: A LITTLE
TOILETING: A LITTLE
DAILY ACTIVITIY SCORE: 24

## 2024-01-28 ASSESSMENT — PAIN SCALES - GENERAL
PAINLEVEL_OUTOF10: 10 - WORST POSSIBLE PAIN
PAINLEVEL_OUTOF10: 5 - MODERATE PAIN
PAINLEVEL_OUTOF10: 10 - WORST POSSIBLE PAIN
PAINLEVEL_OUTOF10: 10 - WORST POSSIBLE PAIN

## 2024-01-28 ASSESSMENT — PAIN - FUNCTIONAL ASSESSMENT
PAIN_FUNCTIONAL_ASSESSMENT: 0-10

## 2024-01-28 ASSESSMENT — PAIN DESCRIPTION - DESCRIPTORS: DESCRIPTORS: ACHING

## 2024-01-28 ASSESSMENT — PAIN DESCRIPTION - ORIENTATION: ORIENTATION: LOWER

## 2024-01-28 NOTE — CARE PLAN
The patient's goals for the shift include pt. will have a bowel movement    The clinical goals for the shift include patient pain will be manageable    Problem: Diabetes  Goal: Achieve decreasing blood glucose levels by end of shift  Outcome: Progressing  Goal: Increase stability of blood glucose readings by end of shift  Outcome: Progressing  Goal: Decrease in ketones present in urine by end of shift  Outcome: Progressing  Goal: Maintain electrolyte levels within acceptable range throughout shift  Outcome: Progressing  Goal: Maintain glucose levels >70mg/dl to <250mg/dl throughout shift  Outcome: Progressing  Goal: No changes in neurological exam by end of shift  Outcome: Progressing  Goal: Learn about and adhere to nutrition recommendations by end of shift  Outcome: Progressing  Goal: Vital signs within normal range for age by end of shift  Outcome: Progressing  Goal: Increase self care and/or family involovement by end of shift  Outcome: Progressing  Goal: Receive DSME education by end of shift  Outcome: Progressing

## 2024-01-28 NOTE — DISCHARGE SUMMARY
Discharge Diagnosis  Failure to thrive in adult    Issues Requiring Follow-Up  Fu with PCP    Test Results Pending At Discharge  Pending Labs       Order Current Status    Extra Urine Gray Tube Collected (01/24/24 0133)    Tacrolimus level In process    Urinalysis with Reflex Culture and Microscopic In process            Hospital Course           73M PMH T2DM (A1c 6.8% 12/2022), HTN, cirrhosis 2/2 alcohol use and chronic hep C, SLE, ESRD 2/2 htn and T2DM now s/p kidney transplant in 2017  on tacrolimus and MMF, BPH s/p HoLEP in 1/2023 which was c/b PE now on xarelto s/p IVC filter placement and removal, GERD and HLD who presented with LBP. Evaluation via MRI demonstrating DDD and mild canal stenosis. PT/OT evaluation demonstrating reduced strength, endurance, and balance. Recommending SNF placement. Admitted for placement    #UTI  -ESRD 2/2 htn and T2DM now s/p kidney transplant in 2017  on tacrolimus and MMF  -urine culture resulted as normal beba, switched cefepime to Ciprofloxacin 250mg BID for  3 more days       #DDD L3/4, L4/5  #Mild foraminal stenosis    #HTN  #HLD   #T2DM   #H/o PE:  On lifelong Xarelto therapy     #Liver Cirrhosis 2/2 chronic HCV infx  -HCV treated historically with harvoni followed by relapse followed by treatment with Vocevi which cleared infection [id number]  -MRI of liver demonstrating signs of liver cirrhosis as well as simple cysts  -Liver function has stabilized since renal transplant in 2017  -Currently not in evaluation for liver transplant   -Monitor for signs of liver injury with PCP  -Avoid hepatotoxic medications     #ESRD s/p kidney transplant  #H/o kidney transplant 2017  -Renal  transplant service consulted   -Tacrolimus 1mg BID  -MMF 500mg BID     #BPH       N: Carb controlled diet  A: PIVx1  DVT: Xarelto        Pertinent Physical Exam At Time of Discharge  Physical Exam    Home Medications     Medication List      START taking these medications     ciprofloxacin 250 mg tablet;  Commonly known as: Cipro; Take 1 tablet   (250 mg) by mouth every 12 hours for 1 dose.   lidocaine 4 % patch; Place 1 patch over 12 hours on the skin once daily.   Remove & discard patch within 12 hours or as directed by MD. Do not start   before January 26, 2024.     CHANGE how you take these medications     acetaminophen 325 mg tablet; Commonly known as: Tylenol; Take 2 tablets   (650 mg) by mouth every 4 hours if needed for mild pain (1 - 3).; What   changed: See the new instructions.   * oxyCODONE 5 mg immediate release tablet; Commonly known as:   Roxicodone; Take 1 tablet (5 mg) by mouth every 6 hours if needed for   moderate pain (4 - 6) for up to 5 days.; What changed: reasons to take   this   * oxyCODONE 10 mg immediate release tablet; Commonly known as:   Roxicodone; Take 1 tablet (10 mg) by mouth every 6 hours if needed for   severe pain (7 - 10) for up to 5 days.; What changed: You were already   taking a medication with the same name, and this prescription was added.   Make sure you understand how and when to take each.   polyethylene glycol 17 gram packet; Commonly known as: Glycolax,   Miralax; Take 17 g by mouth 3 times a day as needed (constipation).; What   changed: when to take this, reasons to take this  * This list has 2 medication(s) that are the same as other medications   prescribed for you. Read the directions carefully, and ask your doctor or   other care provider to review them with you.     CONTINUE taking these medications     Accu-Chek Tori Plus test strp strip; Generic drug: blood sugar   diagnostic   amLODIPine 2.5 mg tablet; Commonly known as: Norvasc; TAKE ONE (1)   TABLET BY MOUTH ONCE DAILY.   artificial tears (dextran-hypomel-glycerin) 0.1-0.3-0.2 % ophthalmic   solution   atorvastatin 20 mg tablet; Commonly known as: Lipitor   cholecalciferol 50,000 unit capsule; Commonly known as: Vitamin D-3   docusate sodium 100 mg capsule; Commonly known as: Colace; Take 1   capsule (100  mg) by mouth 2 times a day as needed for constipation (stop   if having liquid bowel movements).   insulin degludec 100 unit/mL (3 mL) injection; Commonly known as:   Tresiba FlexTouch   insulin lispro 100 unit/mL injection; Commonly known as: HumaLOG   metoprolol succinate XL 50 mg 24 hr tablet; Commonly known as: Toprol-XL   mycophenolate 250 mg capsule; Commonly known as: Cellcept; TAKE TWO (2)   CAPSULES BY MOUTH TWICE DAILY.   OneTouch UltraSoft Lancets misc; Generic drug: lancets   phenazopyridine 200 mg tablet; Commonly known as: Pyridium   tacrolimus 0.5 mg capsule; Commonly known as: Prograf; TAKE TWO (2)   CAPSULES BY MOUTH EVERY 12 HOURS.   triamcinolone 0.1 % cream; Commonly known as: Kenalog   Xarelto 20 mg tablet; Generic drug: rivaroxaban; Take 1 tablet (20 mg)   by mouth once daily in the evening. Take with meals.       Outpatient Follow-Up  Future Appointments   Date Time Provider Department [place]   2/12/2024  1:30 PM Joaquin Gonzales MD [id number] [facility]   4/16/2024 10:00 AM TXP KIDNEY PROVIDER [id number]       Stoney Costa MD

## 2024-01-28 NOTE — CARE PLAN
Per facility still no precert.  Will update attending.  UPDATE: Facility has received auth.  Transport requested for 5 pm.  Nurse to nurse report number is [phone].  Patient going to [facility], room 119.

## 2024-01-28 NOTE — NURSING NOTE
Report called to [facility] @ [phone]. Heplock removed, tip intact. Pt. Aware he is leaving to facility. Carol LOPEZ

## 2024-01-29 ENCOUNTER — NURSING HOME VISIT (OUTPATIENT)
Dept: POST ACUTE CARE | Facility: EXTERNAL LOCATION | Age: 74
End: 2024-01-29
Payer: COMMERCIAL

## 2024-01-29 DIAGNOSIS — T81.718S IATROGENIC PULMONARY EMBOLISM AND INFARCTION, SEQUELA (MULTI): ICD-10-CM

## 2024-01-29 DIAGNOSIS — N18.9 ANEMIA IN CHRONIC KIDNEY DISEASE, UNSPECIFIED CKD STAGE: Primary | ICD-10-CM

## 2024-01-29 DIAGNOSIS — I26.99 IATROGENIC PULMONARY EMBOLISM AND INFARCTION, SEQUELA (MULTI): ICD-10-CM

## 2024-01-29 DIAGNOSIS — R53.1 WEAKNESS: ICD-10-CM

## 2024-01-29 DIAGNOSIS — D63.1 ANEMIA IN CHRONIC KIDNEY DISEASE, UNSPECIFIED CKD STAGE: Primary | ICD-10-CM

## 2024-01-29 DIAGNOSIS — E11.00 TYPE 2 DIABETES MELLITUS WITH HYPEROSMOLARITY WITHOUT COMA, WITH LONG-TERM CURRENT USE OF INSULIN (MULTI): ICD-10-CM

## 2024-01-29 DIAGNOSIS — E78.00 PURE HYPERCHOLESTEROLEMIA: ICD-10-CM

## 2024-01-29 DIAGNOSIS — Z79.4 TYPE 2 DIABETES MELLITUS WITH HYPEROSMOLARITY WITHOUT COMA, WITH LONG-TERM CURRENT USE OF INSULIN (MULTI): ICD-10-CM

## 2024-01-29 DIAGNOSIS — Z94.0 KIDNEY REPLACED BY TRANSPLANT (HHS-HCC): ICD-10-CM

## 2024-01-29 DIAGNOSIS — I10 HYPERTENSION, ESSENTIAL: ICD-10-CM

## 2024-01-29 PROCEDURE — 99309 SBSQ NF CARE MODERATE MDM 30: CPT | Performed by: NURSE PRACTITIONER

## 2024-01-29 NOTE — CARE PLAN
The patient's goals for the shift include pt. will have decreased pain    The clinical goals for the shift include pt. will have bowel movement    Over the shift, the patient did not make progress toward the following goals. Barriers to progression include pt. Will be able to be discharge this evening. Recommendations to address these barriers include Pt. Transportation delayed until 2100 this evening. Discharge summary provided and daughter aware.

## 2024-01-29 NOTE — LETTER
Patient: Albert Schwartz  : 1950    Encounter Date: 2024    PROGRESS NOTE    Subjective  Chief complaint: Albert Schwartz is a 73 y.o. male who is an acute skilled patient being seen and evaluated for weakness    HPI: recently admitted to this facility for rehabilitation. Is motivated to return home. Therapist here for initial assessment. Denies any chest pain or tightness.   HPI      Objective  Vital signs: 126/72-78-18-97.7     Physical Exam  Constitutional:       General: He is not in acute distress.  Eyes:      Extraocular Movements: Extraocular movements intact.   Cardiovascular:      Rate and Rhythm: Normal rate and regular rhythm.   Pulmonary:      Effort: Pulmonary effort is normal.      Breath sounds: Normal breath sounds.      Comments: Lungs clear   Abdominal:      General: Bowel sounds are normal.      Palpations: Abdomen is soft.   Musculoskeletal:      Cervical back: Neck supple.      Right lower leg: No edema.      Left lower leg: No edema.   Neurological:      Mental Status: He is alert.   Psychiatric:         Mood and Affect: Mood normal.         Behavior: Behavior is cooperative.         Assessment/Plan  Problem List Items Addressed This Visit       Hyperlipidemia     Statin  Monitor labs         Hypertension, essential     Monitor blood pressure  Amlodipine  Metoprolol         Type 2 diabetes mellitus (CMS/HCC)     Glucoscans  Insulin  Monitor A1c         Iatrogenic pulmonary embolism and infarction (CMS/[facility])     Anticoagulant  Bleeding precautions         Anemia in chronic kidney disease - Primary     Monitor labs          Kidney replaced by transplant     Monitor labs  CellCept  Tacrolimus         Weakness     Requires rehabilitation   Monitor progress           Medications, treatments, and labs reviewed  Continue medications and treatments as listed in EMR    YESSY Patel      Electronically Signed By: YESSY Patel   24  6:00 PM

## 2024-01-30 ENCOUNTER — NURSING HOME VISIT (OUTPATIENT)
Dept: POST ACUTE CARE | Facility: EXTERNAL LOCATION | Age: 74
End: 2024-01-30
Payer: COMMERCIAL

## 2024-01-30 DIAGNOSIS — E78.5 HYPERLIPIDEMIA, UNSPECIFIED HYPERLIPIDEMIA TYPE: ICD-10-CM

## 2024-01-30 DIAGNOSIS — E11.319 TYPE 2 DIABETES MELLITUS WITH RETINOPATHY, MACULAR EDEMA PRESENCE UNSPECIFIED, UNSPECIFIED LATERALITY, UNSPECIFIED RETINOPATHY SEVERITY, UNSPECIFIED WHETHER LONG TERM INSULIN USE (MULTI): ICD-10-CM

## 2024-01-30 DIAGNOSIS — I10 HYPERTENSION, ESSENTIAL: ICD-10-CM

## 2024-01-30 DIAGNOSIS — N39.0 URINARY TRACT INFECTION WITHOUT HEMATURIA, SITE UNSPECIFIED: ICD-10-CM

## 2024-01-30 DIAGNOSIS — M51.36 DEGENERATIVE LUMBAR DISC: Primary | ICD-10-CM

## 2024-01-30 DIAGNOSIS — Z94.0 KIDNEY REPLACED BY TRANSPLANT (HHS-HCC): ICD-10-CM

## 2024-01-30 PROCEDURE — 99305 1ST NF CARE MODERATE MDM 35: CPT | Performed by: INTERNAL MEDICINE

## 2024-01-30 NOTE — LETTER
Patient: Albert Schwartz  : 1950    Encounter Date: 2024    HISTORY & PHYSICAL    Subjective  Chief complaint: Albert Schwartz is a 73 y.o. male who is being seen and evaluated for multiple medical problems.  Patient presents for Patient admitted to SNF for therapy due to weakness after recent hospitalization.    HPI:  HPI  Patient had presented to hospital with low back pain.  Patient was admitted for further evaluation.  Imaging revealed DDD and mild canal stenosis.  PT OT evaluated patient, which patient demonstrated reduced strength, endurance and balance.  Therapy recommended SNF placement.  Patient also diagnosed with UTI, was started on antibiotics.  Patient has a diagnosis of ESRD due to hypertension and type 2 diabetes, status post kidney transplant in .  Patient was deemed stable for discharge.  Patient was seen and examined at bedside, in no apparent distress.  Denies chest pain or shortness of breath.  Denies nausea or vomiting.  Denies abdominal pain. Afebrile.    Past Medical History:   Diagnosis Date   • Alcohol abuse, in remission    • Arthritis    • Bladder neck contracture     Scheduled for surgery with Dr. Gonzales on 12/15/23   • Blindness    • BPH (benign prostatic hyperplasia)    • Chronic diastolic (congestive) heart failure (CMS/HCC) 2018    Chronic diastolic congestive heart failure   • Chronic hepatitis C (CMS/HCC)    • Cirrhosis (CMS/HCC)     2/2 ETOH use   • Diabetes mellitus (CMS/HCC)    • Disorder of male genital organs, unspecified 2018    Testicular abnormality   • ESRD (end stage renal disease) (CMS/HCC)     S/P renal transplant in 2017, F/W Nephrology Dr. Liu, LOV 10/16/2023   • GERD (gastroesophageal reflux disease)    • Hyperlipidemia    • Hypertension    • Immunodeficiency, unspecified (CMS/HCC) 2018    Immunosuppression   • Kidney transplant status    • Other ascites     Ascites   • Other specified personal risk factors, not elsewhere  classified 01/18/2018    At risk for infection transmitted from donor   • PE (pulmonary thromboembolism) (CMS/HCC)     on Eliquis, F/W cards   • Peripheral vascular disease, unspecified (CMS/HCC) 01/18/2018    PAD (peripheral artery disease)       Past Surgical History:   Procedure Laterality Date   • CATARACT EXTRACTION  01/18/2018    Cataract Extraction   • CYSTOSCOPY     • ESOPHAGOGASTRODUODENOSCOPY     • FL GUIDED ABDOMINAL PARACENTESIS  03/30/2015    FL GUIDED ABDOMINAL PARACENTESIS 3/30/2015 [facility] AIB LEGACY   • FL GUIDED ABDOMINAL PARACENTESIS  04/14/2015    FL GUIDED ABDOMINAL PARACENTESIS 4/14/2015 [facility] INPATIENT LEGACY   • FL GUIDED ABDOMINAL PARACENTESIS  08/28/2015    FL GUIDED ABDOMINAL PARACENTESIS 8/28/2015 [facility] AIB LEGACY   • KIDNEY SURGERY  06/02/2020    Kidney Surgery   • OTHER SURGICAL HISTORY  08/18/2014    Brain Surgery   • OTHER SURGICAL HISTORY  06/30/2020    Renal Transplant   • OTHER SURGICAL HISTORY  06/02/2020    Incisional Hernia Repair   • SPLENECTOMY, TOTAL  06/02/2020    Splenectomy   • US GUIDED ABDOMINAL PARACENTESIS  03/20/2014    [place] GUIDED ABDOMINAL PARACENTESIS 3/20/2014 [facility] AIB LEGACY   • US GUIDED ABDOMINAL PARACENTESIS  08/14/2014    US GUIDED ABDOMINAL PARACENTESIS 8/14/2014 [facility] AIB LEGACY   • US GUIDED ABDOMINAL PARACENTESIS  11/17/2014    US GUIDED ABDOMINAL PARACENTESIS 11/17/2014 [facility] AIB LEGACY   • US GUIDED ABDOMINAL PARACENTESIS  12/11/2014    US GUIDED ABDOMINAL PARACENTESIS 12/11/2014 [facility] CLINICAL LEGACY   • US GUIDED ABDOMINAL PARACENTESIS  01/27/2015    [place] GUIDED ABDOMINAL PARACENTESIS 1/27/2015 [facility] ANCILLARY LEGACY   • US GUIDED ABDOMINAL PARACENTESIS  02/19/2015    US GUIDED ABDOMINAL PARACENTESIS 2/19/2015 [facility] AIB LEGACY   • US GUIDED ABDOMINAL PARACENTESIS  03/18/2015    US GUIDED ABDOMINAL PARACENTESIS 3/18/2015 [facility] AIB LEGACY   • US GUIDED ABDOMINAL PARACENTESIS  04/17/2015    [place] GUIDED ABDOMINAL PARACENTESIS 4/17/2015 [facility] INPATIENT LEGACY   • US GUIDED ABDOMINAL  PARACENTESIS  2015    [place] GUIDED ABDOMINAL PARACENTESIS 2015 [facility] AIB LEGACY   • US GUIDED ABDOMINAL PARACENTESIS  10/12/2015    US GUIDED ABDOMINAL PARACENTESIS 10/12/2015 [facility] CLINICAL LEGACY   • US GUIDED ABDOMINAL PARACENTESIS  10/19/2015    US GUIDED ABDOMINAL PARACENTESIS 10/19/2015 [facility] CLINICAL LEGACY       Family History   Problem Relation Name Age of Onset   • Colon cancer Sister     • Heart disease Brother         Social History     Socioeconomic History   • Marital status: Single     Spouse name: Not on file   • Number of children: Not on file   • Years of education: Not on file   • Highest education level: Not on file   Occupational History   • Not on file   Tobacco Use   • Smoking status: Former     Packs/day: 0.50     Years: 15.00     Additional pack years: 0.00     Total pack years: 7.50     Types: Cigarettes     Quit date: 2000     Years since quittin.0   • Smokeless tobacco: Never   Vaping Use   • Vaping Use: Never used   Substance and Sexual Activity   • Alcohol use: Not Currently   • Drug use: Never   • Sexual activity: Defer   Other Topics Concern   • Not on file   Social History Narrative   • Not on file     Social Determinants of Health     Financial Resource Strain: Medium Risk (2024)    Overall Financial Resource Strain (CARDIA)    • Difficulty of Paying Living Expenses: Somewhat hard   Food Insecurity: Not on file   Transportation Needs: No Transportation Needs (2024)    PRAPARE - Transportation    • Lack of Transportation (Medical): No    • Lack of Transportation (Non-Medical): No   Physical Activity: Not on file   Stress: Not on file   Social Connections: Not on file   Intimate Partner Violence: Not on file   Housing Stability: Low Risk  (2024)    Housing Stability Vital Sign    • Unable to Pay for Housing in the Last Year: No    • Number of Places Lived in the Last Year: 1    • Unstable Housing in the Last Year: No       Vital signs: 122/69, 97.1, 69, 18,  blood sugar 209    Objective  Physical Exam  Constitutional:       Appearance: Normal appearance.   HENT:      Head: Normocephalic.      Mouth/Throat:      Mouth: Mucous membranes are moist.   Eyes:      Extraocular Movements: Extraocular movements intact.   Cardiovascular:      Rate and Rhythm: Normal rate and regular rhythm.      Pulses: Normal pulses.      Heart sounds: Normal heart sounds.   Pulmonary:      Effort: Pulmonary effort is normal.      Breath sounds: Normal breath sounds.   Abdominal:      General: Bowel sounds are normal.      Palpations: Abdomen is soft.   Musculoskeletal:         General: Normal range of motion.      Cervical back: Normal range of motion and neck supple.      Comments:  generalized weakness   Skin:     General: Skin is warm and dry.      Capillary Refill: Capillary refill takes less than 2 seconds.   Neurological:      Mental Status: He is alert and oriented to person, place, and time. Mental status is at baseline.   Psychiatric:         Mood and Affect: Mood normal.         Behavior: Behavior normal.         Assessment/Plan  Problem List Items Addressed This Visit       Hyperlipidemia     Statin  Monitor labs         Hypertension, essential     Monitor blood pressure  Amlodipine  Metoprolol         Type 2 diabetes mellitus (CMS/[facility])     Glucoscans  Insulin  Monitor A1c         Degenerative lumbar disc - Primary     Therapy  Lidocaine patch  As needed meds         Kidney replaced by transplant     Monitor labs  CellCept  Tacrolimus         UTI (urinary tract infection)     Treated in hospital with antibiotics          Hospital records reviewed  Medications, treatments, and labs reviewed  Continue medications and treatments as listed in EMR  Discussed with nursing and therapy      Scribe Attestation  I, Ulises Chaneyibshani   attest that this documentation has been prepared under the direction and in the presence of Caleb Mcdaniel MD    Provider Attestation - Scribe  documentation  All medical record entries made by the Scribe were at my direction and personally dictated by me. I have reviewed the chart and agree that the record accurately reflects my personal performance of the history, physical exam, discussion and plan.   Caleb Mcdaniel MD      Electronically Signed By: Caleb Mcdaniel MD   1/30/24  5:15 PM

## 2024-01-30 NOTE — PROGRESS NOTES
HISTORY & PHYSICAL    Subjective   Chief complaint: Albert Schwartz is a 73 y.o. male who is being seen and evaluated for multiple medical problems.  Patient presents for Patient admitted to SNF for therapy due to weakness after recent hospitalization.    HPI:  HPI  Patient had presented to hospital with low back pain.  Patient was admitted for further evaluation.  Imaging revealed DDD and mild canal stenosis.  PT OT evaluated patient, which patient demonstrated reduced strength, endurance and balance.  Therapy recommended SNF placement.  Patient also diagnosed with UTI, was started on antibiotics.  Patient has a diagnosis of ESRD due to hypertension and type 2 diabetes, status post kidney transplant in 2017.  Patient was deemed stable for discharge.  Patient was seen and examined at bedside, in no apparent distress.  Denies chest pain or shortness of breath.  Denies nausea or vomiting.  Denies abdominal pain. Afebrile.    Past Medical History:   Diagnosis Date    Alcohol abuse, in remission     Arthritis     Bladder neck contracture     Scheduled for surgery with Dr. Gonzales on 12/15/23    Blindness     BPH (benign prostatic hyperplasia)     Chronic diastolic (congestive) heart failure (CMS/HCC) 01/18/2018    Chronic diastolic congestive heart failure    Chronic hepatitis C (CMS/HCC)     Cirrhosis (CMS/HCC)     2/2 ETOH use    Diabetes mellitus (CMS/HCC)     Disorder of male genital organs, unspecified 12/11/2018    Testicular abnormality    ESRD (end stage renal disease) (CMS/HCC)     S/P renal transplant in 2017, F/W Nephrology Dr. Liu, LOV 10/16/2023    GERD (gastroesophageal reflux disease)     Hyperlipidemia     Hypertension     Immunodeficiency, unspecified (CMS/HCC) 01/18/2018    Immunosuppression    Kidney transplant status     Other ascites     Ascites    Other specified personal risk factors, not elsewhere classified 01/18/2018    At risk for infection transmitted from donor    PE (pulmonary  thromboembolism) (CMS/HCC)     on Eliquis, F/W cards    Peripheral vascular disease, unspecified (CMS/HCC) 01/18/2018    PAD (peripheral artery disease)       Past Surgical History:   Procedure Laterality Date    CATARACT EXTRACTION  01/18/2018    Cataract Extraction    CYSTOSCOPY      ESOPHAGOGASTRODUODENOSCOPY      FL GUIDED ABDOMINAL PARACENTESIS  03/30/2015    FL GUIDED ABDOMINAL PARACENTESIS 3/30/2015 [facility] AIB LEGACY    FL GUIDED ABDOMINAL PARACENTESIS  04/14/2015    FL GUIDED ABDOMINAL PARACENTESIS 4/14/2015 [facility] INPATIENT LEGACY    FL GUIDED ABDOMINAL PARACENTESIS  08/28/2015    FL GUIDED ABDOMINAL PARACENTESIS 8/28/2015 [facility] AIB LEGACY    KIDNEY SURGERY  06/02/2020    Kidney Surgery    OTHER SURGICAL HISTORY  08/18/2014    Brain Surgery    OTHER SURGICAL HISTORY  06/30/2020    Renal Transplant    OTHER SURGICAL HISTORY  06/02/2020    Incisional Hernia Repair    SPLENECTOMY, TOTAL  06/02/2020    Splenectomy    US GUIDED ABDOMINAL PARACENTESIS  03/20/2014    US GUIDED ABDOMINAL PARACENTESIS 3/20/2014 [facility] AIB LEGACY    US GUIDED ABDOMINAL PARACENTESIS  08/14/2014    US GUIDED ABDOMINAL PARACENTESIS 8/14/2014 [facility] AIB LEGACY    US GUIDED ABDOMINAL PARACENTESIS  11/17/2014    US GUIDED ABDOMINAL PARACENTESIS 11/17/2014 [facility] AIB LEGACY    US GUIDED ABDOMINAL PARACENTESIS  12/11/2014    US GUIDED ABDOMINAL PARACENTESIS 12/11/2014 [facility] CLINICAL LEGACY    US GUIDED ABDOMINAL PARACENTESIS  01/27/2015    US GUIDED ABDOMINAL PARACENTESIS 1/27/2015 [facility] ANCILLARY LEGACY    US GUIDED ABDOMINAL PARACENTESIS  02/19/2015    US GUIDED ABDOMINAL PARACENTESIS 2/19/2015 [facility] AIB LEGACY    US GUIDED ABDOMINAL PARACENTESIS  03/18/2015    US GUIDED ABDOMINAL PARACENTESIS 3/18/2015 [facility] AIB LEGACY    US GUIDED ABDOMINAL PARACENTESIS  04/17/2015    US GUIDED ABDOMINAL PARACENTESIS 4/17/2015 [facility] INPATIENT LEGACY    US GUIDED ABDOMINAL PARACENTESIS  07/14/2015    US GUIDED ABDOMINAL PARACENTESIS 7/14/2015 [facility] AIB LEGACY    US GUIDED ABDOMINAL  PARACENTESIS  10/12/2015    US GUIDED ABDOMINAL PARACENTESIS 10/12/2015 [facility] CLINICAL LEGACY    US GUIDED ABDOMINAL PARACENTESIS  10/19/2015    US GUIDED ABDOMINAL PARACENTESIS 10/19/2015 [facility] CLINICAL LEGACY       Family History   Problem Relation Name Age of Onset    Colon cancer Sister      Heart disease Brother         Social History     Socioeconomic History    Marital status: Single     Spouse name: Not on file    Number of children: Not on file    Years of education: Not on file    Highest education level: Not on file   Occupational History    Not on file   Tobacco Use    Smoking status: Former     Packs/day: 0.50     Years: 15.00     Additional pack years: 0.00     Total pack years: 7.50     Types: Cigarettes     Quit date: 2000     Years since quittin.0    Smokeless tobacco: Never   Vaping Use    Vaping Use: Never used   Substance and Sexual Activity    Alcohol use: Not Currently    Drug use: Never    Sexual activity: Defer   Other Topics Concern    Not on file   Social History Narrative    Not on file     Social Determinants of Health     Financial Resource Strain: Medium Risk (2024)    Overall Financial Resource Strain (CARDIA)     Difficulty of Paying Living Expenses: Somewhat hard   Food Insecurity: Not on file   Transportation Needs: No Transportation Needs (2024)    PRAPARE - Transportation     Lack of Transportation (Medical): No     Lack of Transportation (Non-Medical): No   Physical Activity: Not on file   Stress: Not on file   Social Connections: Not on file   Intimate Partner Violence: Not on file   Housing Stability: Low Risk  (2024)    Housing Stability Vital Sign     Unable to Pay for Housing in the Last Year: No     Number of Places Lived in the Last Year: 1     Unstable Housing in the Last Year: No       Vital signs: 122/69, 97.1, 69, 18, blood sugar 209    Objective   Physical Exam  Constitutional:       Appearance: Normal appearance.   HENT:      Head: Normocephalic.       Mouth/Throat:      Mouth: Mucous membranes are moist.   Eyes:      Extraocular Movements: Extraocular movements intact.   Cardiovascular:      Rate and Rhythm: Normal rate and regular rhythm.      Pulses: Normal pulses.      Heart sounds: Normal heart sounds.   Pulmonary:      Effort: Pulmonary effort is normal.      Breath sounds: Normal breath sounds.   Abdominal:      General: Bowel sounds are normal.      Palpations: Abdomen is soft.   Musculoskeletal:         General: Normal range of motion.      Cervical back: Normal range of motion and neck supple.      Comments:  generalized weakness   Skin:     General: Skin is warm and dry.      Capillary Refill: Capillary refill takes less than 2 seconds.   Neurological:      Mental Status: He is alert and oriented to person, place, and time. Mental status is at baseline.   Psychiatric:         Mood and Affect: Mood normal.         Behavior: Behavior normal.         Assessment/Plan   Problem List Items Addressed This Visit       Hyperlipidemia     Statin  Monitor labs         Hypertension, essential     Monitor blood pressure  Amlodipine  Metoprolol         Type 2 diabetes mellitus (CMS/[facility])     Glucoscans  Insulin  Monitor A1c         Degenerative lumbar disc - Primary     Therapy  Lidocaine patch  As needed meds         Kidney replaced by transplant     Monitor labs  CellCept  Tacrolimus         UTI (urinary tract infection)     Treated in hospital with antibiotics          Hospital records reviewed  Medications, treatments, and labs reviewed  Continue medications and treatments as listed in EMR  Discussed with nursing and therapy      Scribe Attestation  IJennifer Scribe   attest that this documentation has been prepared under the direction and in the presence of Caleb Mcdaniel MD    Provider Attestation - Scribe documentation  All medical record entries made by the Scribe were at my direction and personally dictated by me. I have reviewed the chart and  agree that the record accurately reflects my personal performance of the history, physical exam, discussion and plan.   Caleb Mcdaniel MD

## 2024-01-31 ENCOUNTER — NURSING HOME VISIT (OUTPATIENT)
Dept: POST ACUTE CARE | Facility: EXTERNAL LOCATION | Age: 74
End: 2024-01-31
Payer: COMMERCIAL

## 2024-01-31 DIAGNOSIS — Z79.4 TYPE 2 DIABETES MELLITUS WITH HYPEROSMOLARITY WITHOUT COMA, WITH LONG-TERM CURRENT USE OF INSULIN (MULTI): ICD-10-CM

## 2024-01-31 DIAGNOSIS — E11.00 TYPE 2 DIABETES MELLITUS WITH HYPEROSMOLARITY WITHOUT COMA, WITH LONG-TERM CURRENT USE OF INSULIN (MULTI): ICD-10-CM

## 2024-01-31 DIAGNOSIS — I10 HYPERTENSION, ESSENTIAL: ICD-10-CM

## 2024-01-31 DIAGNOSIS — T81.718S IATROGENIC PULMONARY EMBOLISM AND INFARCTION, SEQUELA (MULTI): ICD-10-CM

## 2024-01-31 DIAGNOSIS — K59.01 SLOW TRANSIT CONSTIPATION: ICD-10-CM

## 2024-01-31 DIAGNOSIS — I26.99 IATROGENIC PULMONARY EMBOLISM AND INFARCTION, SEQUELA (MULTI): ICD-10-CM

## 2024-01-31 DIAGNOSIS — R53.1 WEAKNESS: Primary | ICD-10-CM

## 2024-01-31 DIAGNOSIS — J44.89 OBSTRUCTIVE CHRONIC BRONCHITIS WITHOUT EXACERBATION (MULTI): ICD-10-CM

## 2024-01-31 PROBLEM — K59.00 CONSTIPATION: Status: ACTIVE | Noted: 2024-01-31

## 2024-01-31 PROCEDURE — 99309 SBSQ NF CARE MODERATE MDM 30: CPT | Performed by: NURSE PRACTITIONER

## 2024-01-31 NOTE — LETTER
Patient: Albert Schwartz  : 1950    Encounter Date: 2024    PROGRESS NOTE    Subjective  Chief complaint: Albert Schwartz is a 73 y.o. male who is an acute skilled patient being seen and evaluated for weakness    HPI: Is visiting with his daughter. He verbalized multiple concerns about his  routine and timeliness of his medications.   DON here to discuss all concerns.   He reports that he is feeling constipated, request miralax   HPI      Objective  Vital signs: 126/73-78-18-97.4     Physical Exam  Vitals and nursing note reviewed.   Constitutional:       General: He is not in acute distress.     Appearance: He is well-groomed.      Comments: Frustrated about multiple concerns    Eyes:      Extraocular Movements: Extraocular movements intact.   Pulmonary:      Effort: Pulmonary effort is normal.      Comments: Lungs clear   Abdominal:      Comments: Soft nondistended   BS x4    Musculoskeletal:      Cervical back: Neck supple.   Neurological:      Mental Status: He is alert.   Psychiatric:         Mood and Affect: Mood normal.         Behavior: Behavior is cooperative.         Assessment/Plan  Problem List Items Addressed This Visit       Hypertension, essential     Monitor blood pressure  Amlodipine  Metoprolol         Obstructive chronic bronchitis without exacerbation      Lungs clear   Pulse oximetry 98%         Type 2 diabetes mellitus (CMS/HCC)     Glucoscans  Insulin  Monitor A1c         Iatrogenic pulmonary embolism and infarction (CMS/HCC)     Anticoagulant  Bleeding precautions         Weakness - Primary     Monitor progress and endurance          Constipation     Requested miralax- takes routinely   Abdomen soft, BS x4           Medications, treatments, and labs reviewed  Continue medications and treatments as listed in EMR    YESSY Patel      Electronically Signed By: YESSY Patel   24  6:11 PM

## 2024-01-31 NOTE — PROGRESS NOTES
PROGRESS NOTE    Subjective   Chief complaint: Albert Schwartz is a 73 y.o. male who is an acute skilled patient being seen and evaluated for weakness    HPI: recently admitted to this facility for rehabilitation. Is motivated to return home. Therapist here for initial assessment. Denies any chest pain or tightness.   HPI      Objective   Vital signs: 126/72-78-18-97.7     Physical Exam  Constitutional:       General: He is not in acute distress.  Eyes:      Extraocular Movements: Extraocular movements intact.   Cardiovascular:      Rate and Rhythm: Normal rate and regular rhythm.   Pulmonary:      Effort: Pulmonary effort is normal.      Breath sounds: Normal breath sounds.      Comments: Lungs clear   Abdominal:      General: Bowel sounds are normal.      Palpations: Abdomen is soft.   Musculoskeletal:      Cervical back: Neck supple.      Right lower leg: No edema.      Left lower leg: No edema.   Neurological:      Mental Status: He is alert.   Psychiatric:         Mood and Affect: Mood normal.         Behavior: Behavior is cooperative.         Assessment/Plan   Problem List Items Addressed This Visit       Hyperlipidemia     Statin  Monitor labs         Hypertension, essential     Monitor blood pressure  Amlodipine  Metoprolol         Type 2 diabetes mellitus (CMS/HCC)     Glucoscans  Insulin  Monitor A1c         Iatrogenic pulmonary embolism and infarction (CMS/[facility])     Anticoagulant  Bleeding precautions         Anemia in chronic kidney disease - Primary     Monitor labs          Kidney replaced by transplant     Monitor labs  CellCept  Tacrolimus         Weakness     Requires rehabilitation   Monitor progress           Medications, treatments, and labs reviewed  Continue medications and treatments as listed in EMR    Tanisha Whaley, APRN-CNP

## 2024-01-31 NOTE — PROGRESS NOTES
PROGRESS NOTE    Subjective   Chief complaint: Albert Schwartz is a 73 y.o. male who is an acute skilled patient being seen and evaluated for weakness    HPI: Is visiting with his daughter. He verbalized multiple concerns about his  routine and timeliness of his medications.   DON here to discuss all concerns.   He reports that he is feeling constipated, request miralax   HPI      Objective   Vital signs: 126/73-78-18-97.4     Physical Exam  Vitals and nursing note reviewed.   Constitutional:       General: He is not in acute distress.     Appearance: He is well-groomed.      Comments: Frustrated about multiple concerns    Eyes:      Extraocular Movements: Extraocular movements intact.   Pulmonary:      Effort: Pulmonary effort is normal.      Comments: Lungs clear   Abdominal:      Comments: Soft nondistended   BS x4    Musculoskeletal:      Cervical back: Neck supple.   Neurological:      Mental Status: He is alert.   Psychiatric:         Mood and Affect: Mood normal.         Behavior: Behavior is cooperative.         Assessment/Plan   Problem List Items Addressed This Visit       Hypertension, essential     Monitor blood pressure  Amlodipine  Metoprolol         Obstructive chronic bronchitis without exacerbation      Lungs clear   Pulse oximetry 98%         Type 2 diabetes mellitus (CMS/[facility])     Glucoscans  Insulin  Monitor A1c         Iatrogenic pulmonary embolism and infarction (CMS/[facility])     Anticoagulant  Bleeding precautions         Weakness - Primary     Monitor progress and endurance          Constipation     Requested miralax- takes routinely   Abdomen soft, BS x4           Medications, treatments, and labs reviewed  Continue medications and treatments as listed in EMR    Tanisha Whaley, APRN-CNP

## 2024-02-01 ENCOUNTER — NURSING HOME VISIT (OUTPATIENT)
Dept: POST ACUTE CARE | Facility: EXTERNAL LOCATION | Age: 74
End: 2024-02-01
Payer: COMMERCIAL

## 2024-02-01 DIAGNOSIS — E78.5 HYPERLIPIDEMIA, UNSPECIFIED HYPERLIPIDEMIA TYPE: ICD-10-CM

## 2024-02-01 DIAGNOSIS — I10 HYPERTENSION, ESSENTIAL: ICD-10-CM

## 2024-02-01 DIAGNOSIS — M51.36 DEGENERATIVE LUMBAR DISC: ICD-10-CM

## 2024-02-01 DIAGNOSIS — R53.1 WEAKNESS: Primary | ICD-10-CM

## 2024-02-01 PROCEDURE — 99309 SBSQ NF CARE MODERATE MDM 30: CPT | Performed by: INTERNAL MEDICINE

## 2024-02-01 NOTE — LETTER
Patient: Albert Schwartz  : 1950    Encounter Date: 2024    PROGRESS NOTE    Subjective  Chief complaint: Albert Schwartz is a 73 y.o. male who is an acute skilled patient being seen and evaluated for weakness  And monthly general medical care and follow-up.  HPI:  HPI  Patient presents for general medical care and f/u.  Patient seen and examined at bedside.  No issues per nursing.  Patient has no acute complaints.  Patient is working in therapy due to generalized weakness.  Patient is working on transfers require supervision.  Patient is working on gait training, ambulating with front wheel walker to min to mod assist.  HTN BP at goal.  Denies chest pain and headache.  HLD, denies angina or leg cramps.  Type II DM, denies polydipsia, polyuria, polyphasia.  Patient in no acute distress.    Objective  Vital signs: 117/64, 97.9, 74, 18, blood sugar 266, 97%    Physical Exam  Constitutional:       General: He is not in acute distress.  Eyes:      Extraocular Movements: Extraocular movements intact.   Cardiovascular:      Rate and Rhythm: Normal rate and regular rhythm.   Pulmonary:      Effort: Pulmonary effort is normal.      Breath sounds: Normal breath sounds.   Abdominal:      General: Bowel sounds are normal.      Palpations: Abdomen is soft.   Musculoskeletal:      Cervical back: Neck supple.      Right lower leg: No edema.      Left lower leg: No edema.   Neurological:      Mental Status: He is alert.   Psychiatric:         Mood and Affect: Mood normal.         Behavior: Behavior is cooperative.         Assessment/Plan  Problem List Items Addressed This Visit       Hyperlipidemia     Statin  Monitor labs         Hypertension, essential     Monitor blood pressure  Amlodipine  Metoprolol         Degenerative lumbar disc     Therapy  Lidocaine patch  As needed meds         Weakness - Primary     Continue to work in therapy towards goals          Medications, treatments, and labs reviewed  Continue  medications and treatments as listed in EMR      Scribe Attestation  I, Rich Chaney   attest that this documentation has been prepared under the direction and in the presence of Caleb Mcdaniel MD    Provider Attestation - Scribe documentation  All medical record entries made by the Scribe were at my direction and personally dictated by me. I have reviewed the chart and agree that the record accurately reflects my personal performance of the history, physical exam, discussion and plan.   Caleb Mcdaniel MD        Electronically Signed By: Caleb Mcdaniel MD   2/1/24  1:09 PM

## 2024-02-01 NOTE — PROGRESS NOTES
PROGRESS NOTE    Subjective   Chief complaint: Albert Schwartz is a 73 y.o. male who is an acute skilled patient being seen and evaluated for weakness  And monthly general medical care and follow-up.  HPI:  HPI  Patient presents for general medical care and f/u.  Patient seen and examined at bedside.  No issues per nursing.  Patient has no acute complaints.  Patient is working in therapy due to generalized weakness.  Patient is working on transfers require supervision.  Patient is working on gait training, ambulating with front wheel walker to min to mod assist.  HTN BP at goal.  Denies chest pain and headache.  HLD, denies angina or leg cramps.  Type II DM, denies polydipsia, polyuria, polyphasia.  Patient in no acute distress.    Objective   Vital signs: 117/64, 97.9, 74, 18, blood sugar 266, 97%    Physical Exam  Constitutional:       General: He is not in acute distress.  Eyes:      Extraocular Movements: Extraocular movements intact.   Cardiovascular:      Rate and Rhythm: Normal rate and regular rhythm.   Pulmonary:      Effort: Pulmonary effort is normal.      Breath sounds: Normal breath sounds.   Abdominal:      General: Bowel sounds are normal.      Palpations: Abdomen is soft.   Musculoskeletal:      Cervical back: Neck supple.      Right lower leg: No edema.      Left lower leg: No edema.   Neurological:      Mental Status: He is alert.   Psychiatric:         Mood and Affect: Mood normal.         Behavior: Behavior is cooperative.         Assessment/Plan   Problem List Items Addressed This Visit       Hyperlipidemia     Statin  Monitor labs         Hypertension, essential     Monitor blood pressure  Amlodipine  Metoprolol         Degenerative lumbar disc     Therapy  Lidocaine patch  As needed meds         Weakness - Primary     Continue to work in therapy towards goals          Medications, treatments, and labs reviewed  Continue medications and treatments as listed in EMR      Scribe Attestation  I,  Ulises Chaneyibe   attest that this documentation has been prepared under the direction and in the presence of Caleb Mcdaniel MD    Provider Attestation - Scribe documentation  All medical record entries made by the Scribe were at my direction and personally dictated by me. I have reviewed the chart and agree that the record accurately reflects my personal performance of the history, physical exam, discussion and plan.   Caleb Mcdaniel MD

## 2024-02-06 ENCOUNTER — NURSING HOME VISIT (OUTPATIENT)
Dept: POST ACUTE CARE | Facility: EXTERNAL LOCATION | Age: 74
End: 2024-02-06
Payer: COMMERCIAL

## 2024-02-06 DIAGNOSIS — J44.89 OBSTRUCTIVE CHRONIC BRONCHITIS WITHOUT EXACERBATION (MULTI): ICD-10-CM

## 2024-02-06 DIAGNOSIS — E11.319 TYPE 2 DIABETES MELLITUS WITH RETINOPATHY, MACULAR EDEMA PRESENCE UNSPECIFIED, UNSPECIFIED LATERALITY, UNSPECIFIED RETINOPATHY SEVERITY, UNSPECIFIED WHETHER LONG TERM INSULIN USE (MULTI): ICD-10-CM

## 2024-02-06 DIAGNOSIS — R53.1 WEAKNESS: ICD-10-CM

## 2024-02-06 DIAGNOSIS — I82.4Y1 DEEP VEIN THROMBOSIS (DVT) OF PROXIMAL VEIN OF RIGHT LOWER EXTREMITY, UNSPECIFIED CHRONICITY (MULTI): ICD-10-CM

## 2024-02-06 DIAGNOSIS — I10 HYPERTENSION, ESSENTIAL: ICD-10-CM

## 2024-02-06 PROCEDURE — 99308 SBSQ NF CARE LOW MDM 20: CPT | Performed by: INTERNAL MEDICINE

## 2024-02-06 NOTE — LETTER
Patient: Albert Schwartz  : 1950    Encounter Date: 2024    PROGRESS NOTE    Subjective  Chief complaint: Albert Schwartz is a 73 y.o. male who is an acute skilled patient being seen and evaluated for weakness    HPI:  Patient presents for f/u therapy and general medical care.  Patient seen and examined at beside.  Therapy has been working with the patient to improve strength, endurance, ADLs, and transfers d/t generalized weakness. Therapist facilitated patient with BUE MMS proxima <> distal through gross\fine motor joints\planes @ 45 reps utilizing multiple resistance @ 50% carryover as part of patients H.E.P. Patient has no acute concerns today.      Objective  Vital signs: 154/85,70,99%,     Physical Exam  Constitutional:       General: He is not in acute distress.  Eyes:      Extraocular Movements: Extraocular movements intact.   Cardiovascular:      Rate and Rhythm: Normal rate and regular rhythm.   Pulmonary:      Effort: Pulmonary effort is normal.      Breath sounds: Normal breath sounds.   Abdominal:      General: Bowel sounds are normal.      Palpations: Abdomen is soft.   Musculoskeletal:      Cervical back: Neck supple.      Right lower leg: No edema.      Left lower leg: No edema.   Neurological:      Mental Status: He is alert.   Psychiatric:         Mood and Affect: Mood normal.         Behavior: Behavior is cooperative.         Assessment/Plan  Problem List Items Addressed This Visit       Hypertension, essential     Monitor blood pressure  Amlodipine  Metoprolol         Obstructive chronic bronchitis without exacerbation     Stable on RA  Monitor for SOB         Type 2 diabetes mellitus (CMS/HCC)     Glucoscans  Insulin  Monitor A1c         Deep vein thrombosis (DVT) of right lower extremity (CMS/[facility])     Xarelto  Monitor          Weakness     Continue to work in therapy towards goals          Medications, treatments, and labs reviewed  Continue medications and treatments as listed  in EMR    Scribe Attestation  I, Rich Cervantes   attest that this documentation has been prepared under the direction and in the presence of Caleb Mcdaniel MD.     Provider Attestation - Scribe documentation  All medical record entries made by the Scribe were at my direction and personally dictated by me. I have reviewed the chart and agree that the record accurately reflects my personal performance of the history, physical exam, discussion and plan.   Caleb Mcdaniel MD      Electronically Signed By: Caleb Mcdaniel MD   2/6/24  3:33 PM

## 2024-02-06 NOTE — PROGRESS NOTES
PROGRESS NOTE    Subjective   Chief complaint: Albert Schwartz is a 73 y.o. male who is an acute skilled patient being seen and evaluated for weakness    HPI:  Patient presents for f/u therapy and general medical care.  Patient seen and examined at beside.  Therapy has been working with the patient to improve strength, endurance, ADLs, and transfers d/t generalized weakness. Therapist facilitated patient with BUE MMS proxima <> distal through gross\fine motor joints\planes @ 45 reps utilizing multiple resistance @ 50% carryover as part of patients H.E.P. Patient has no acute concerns today.      Objective   Vital signs: 154/85,70,99%,     Physical Exam  Constitutional:       General: He is not in acute distress.  Eyes:      Extraocular Movements: Extraocular movements intact.   Cardiovascular:      Rate and Rhythm: Normal rate and regular rhythm.   Pulmonary:      Effort: Pulmonary effort is normal.      Breath sounds: Normal breath sounds.   Abdominal:      General: Bowel sounds are normal.      Palpations: Abdomen is soft.   Musculoskeletal:      Cervical back: Neck supple.      Right lower leg: No edema.      Left lower leg: No edema.   Neurological:      Mental Status: He is alert.   Psychiatric:         Mood and Affect: Mood normal.         Behavior: Behavior is cooperative.         Assessment/Plan   Problem List Items Addressed This Visit       Hypertension, essential     Monitor blood pressure  Amlodipine  Metoprolol         Obstructive chronic bronchitis without exacerbation     Stable on RA  Monitor for SOB         Type 2 diabetes mellitus (CMS/[facility])     Glucoscans  Insulin  Monitor A1c         Deep vein thrombosis (DVT) of right lower extremity (CMS/[facility])     Xarelto  Monitor          Weakness     Continue to work in therapy towards goals          Medications, treatments, and labs reviewed  Continue medications and treatments as listed in EMR    Rich Brandestation  PHILIPPE, Rich Cervantes  that this documentation has been prepared under the direction and in the presence of Caleb Mcdaniel MD.     Provider Attestation - Scribe documentation  All medical record entries made by the Scribe were at my direction and personally dictated by me. I have reviewed the chart and agree that the record accurately reflects my personal performance of the history, physical exam, discussion and plan.   Caleb Mcdaniel MD

## 2024-02-08 ENCOUNTER — NURSING HOME VISIT (OUTPATIENT)
Dept: POST ACUTE CARE | Facility: EXTERNAL LOCATION | Age: 74
End: 2024-02-08
Payer: COMMERCIAL

## 2024-02-08 DIAGNOSIS — R53.1 WEAKNESS: Primary | ICD-10-CM

## 2024-02-08 DIAGNOSIS — I50.32 CHRONIC DIASTOLIC CONGESTIVE HEART FAILURE (MULTI): ICD-10-CM

## 2024-02-08 DIAGNOSIS — M51.36 DEGENERATIVE LUMBAR DISC: ICD-10-CM

## 2024-02-08 DIAGNOSIS — I10 HYPERTENSION, ESSENTIAL: ICD-10-CM

## 2024-02-08 DIAGNOSIS — I82.4Y1 DEEP VEIN THROMBOSIS (DVT) OF PROXIMAL VEIN OF RIGHT LOWER EXTREMITY, UNSPECIFIED CHRONICITY (MULTI): ICD-10-CM

## 2024-02-08 PROCEDURE — 99308 SBSQ NF CARE LOW MDM 20: CPT | Performed by: INTERNAL MEDICINE

## 2024-02-08 NOTE — LETTER
Patient: Albert Schwartz  : 1950    Encounter Date: 2024    PROGRESS NOTE    Subjective  Chief complaint: Albret Schwartz is a 73 y.o. male who is an acute skilled patient being seen and evaluated for weakness    HPI:  HPI  Therapy is working with patient on bilateral upper extremity strengthening and fine and gross motor skills.  Patient was seen and examined at bedside, appears to be in no apparent distress.  Denies chest pain or shortness of breath.  Denies nausea or vomiting.  Denies fever or chills.  Nursing staff voicing no new concerns at this time.    Objective  Vital signs: 190/70, 98.2, 64, 14, blood sugar 115, 95%    Physical Exam  Constitutional:       General: He is not in acute distress.  Eyes:      Extraocular Movements: Extraocular movements intact.   Cardiovascular:      Rate and Rhythm: Normal rate and regular rhythm.   Pulmonary:      Effort: Pulmonary effort is normal.      Breath sounds: Normal breath sounds.   Abdominal:      General: Bowel sounds are normal.      Palpations: Abdomen is soft.   Musculoskeletal:      Cervical back: Neck supple.      Right lower leg: No edema.      Left lower leg: No edema.   Neurological:      Mental Status: He is alert.      Motor: No weakness.   Psychiatric:         Mood and Affect: Mood normal.         Behavior: Behavior is cooperative.         Assessment/Plan  Problem List Items Addressed This Visit       Hypertension, essential     Monitor blood pressure  Amlodipine  Metoprolol         Degenerative lumbar disc     Therapy  Lidocaine patch  As needed meds         Deep vein thrombosis (DVT) of right lower extremity (CMS/HCC)     Xarelto  Monitor          Chronic diastolic congestive heart failure (CMS/HCC)     Stable  Monitor weight         Weakness - Primary     Continue to work toward established goals in therapy          Medications, treatments, and labs reviewed  Continue medications and treatments as listed in EMR      Scribe Attestation  I,  Ulises Chaenyibe   attest that this documentation has been prepared under the direction and in the presence of Caleb Mcdaniel MD    Provider Attestation - Scribe documentation  All medical record entries made by the Scribe were at my direction and personally dictated by me. I have reviewed the chart and agree that the record accurately reflects my personal performance of the history, physical exam, discussion and plan.   Caleb Mcdaniel MD        Electronically Signed By: Caleb Mcdaniel MD   2/8/24  2:22 PM

## 2024-02-12 ENCOUNTER — OFFICE VISIT (OUTPATIENT)
Dept: UROLOGY | Facility: CLINIC | Age: 74
End: 2024-02-12
Payer: COMMERCIAL

## 2024-02-12 ENCOUNTER — NURSING HOME VISIT (OUTPATIENT)
Dept: POST ACUTE CARE | Facility: EXTERNAL LOCATION | Age: 74
End: 2024-02-12

## 2024-02-12 VITALS
DIASTOLIC BLOOD PRESSURE: 78 MMHG | WEIGHT: 197 LBS | BODY MASS INDEX: 27.58 KG/M2 | TEMPERATURE: 97.3 F | HEART RATE: 67 BPM | HEIGHT: 71 IN | SYSTOLIC BLOOD PRESSURE: 173 MMHG

## 2024-02-12 DIAGNOSIS — I10 HYPERTENSION, ESSENTIAL: ICD-10-CM

## 2024-02-12 DIAGNOSIS — R82.90 ABNORMAL URINALYSIS: ICD-10-CM

## 2024-02-12 DIAGNOSIS — N13.8 BENIGN PROSTATIC HYPERPLASIA WITH URINARY OBSTRUCTION: ICD-10-CM

## 2024-02-12 DIAGNOSIS — I82.401 DEEP VEIN THROMBOSIS (DVT) OF RIGHT LOWER EXTREMITY, UNSPECIFIED CHRONICITY, UNSPECIFIED VEIN (MULTI): ICD-10-CM

## 2024-02-12 DIAGNOSIS — K59.09 OTHER CONSTIPATION: Primary | ICD-10-CM

## 2024-02-12 DIAGNOSIS — E11.01 TYPE 2 DIABETES MELLITUS WITH HYPEROSMOLAR COMA, WITH LONG-TERM CURRENT USE OF INSULIN (MULTI): ICD-10-CM

## 2024-02-12 DIAGNOSIS — Z79.4 TYPE 2 DIABETES MELLITUS WITH HYPEROSMOLAR COMA, WITH LONG-TERM CURRENT USE OF INSULIN (MULTI): ICD-10-CM

## 2024-02-12 DIAGNOSIS — N40.1 BENIGN PROSTATIC HYPERPLASIA WITH URINARY OBSTRUCTION: ICD-10-CM

## 2024-02-12 PROCEDURE — 87086 URINE CULTURE/COLONY COUNT: CPT

## 2024-02-12 PROCEDURE — 1125F AMNT PAIN NOTED PAIN PRSNT: CPT | Performed by: STUDENT IN AN ORGANIZED HEALTH CARE EDUCATION/TRAINING PROGRAM

## 2024-02-12 PROCEDURE — 3044F HG A1C LEVEL LT 7.0%: CPT | Performed by: STUDENT IN AN ORGANIZED HEALTH CARE EDUCATION/TRAINING PROGRAM

## 2024-02-12 PROCEDURE — 1036F TOBACCO NON-USER: CPT | Performed by: STUDENT IN AN ORGANIZED HEALTH CARE EDUCATION/TRAINING PROGRAM

## 2024-02-12 PROCEDURE — 99024 POSTOP FOLLOW-UP VISIT: CPT | Performed by: STUDENT IN AN ORGANIZED HEALTH CARE EDUCATION/TRAINING PROGRAM

## 2024-02-12 PROCEDURE — 1160F RVW MEDS BY RX/DR IN RCRD: CPT | Performed by: STUDENT IN AN ORGANIZED HEALTH CARE EDUCATION/TRAINING PROGRAM

## 2024-02-12 PROCEDURE — 1159F MED LIST DOCD IN RCRD: CPT | Performed by: STUDENT IN AN ORGANIZED HEALTH CARE EDUCATION/TRAINING PROGRAM

## 2024-02-12 PROCEDURE — 3077F SYST BP >= 140 MM HG: CPT | Performed by: STUDENT IN AN ORGANIZED HEALTH CARE EDUCATION/TRAINING PROGRAM

## 2024-02-12 PROCEDURE — 99309 SBSQ NF CARE MODERATE MDM 30: CPT | Performed by: NURSE PRACTITIONER

## 2024-02-12 PROCEDURE — 3078F DIAST BP <80 MM HG: CPT | Performed by: STUDENT IN AN ORGANIZED HEALTH CARE EDUCATION/TRAINING PROGRAM

## 2024-02-12 NOTE — PROGRESS NOTES
Albert presents for a post-op visit.   The patient’s EMR has been reviewed.  Lives in [place], [place].  Currently living at an skilled living facility.   Previously evaluated by Dr. Sifuentes, Dr. Luo and Dr. Castle.    H/o BPH w/ LUTS, s/p HoLEP with Dr. Sifuentes (Jan 2023).  H/o BNC, urinary incontinence, urethral stricture, rUTIs, pyelonephritis. S/p renal transplant. (2017)     Primarily followed for his BPH and BNC.  S/p HoLEP with Dr. Sifuentes (Jan 2023).   Post-op c/b PE, s/p IVC filter placement and removal.  Since then has developed recurrent obstructive symptoms.   Primarily c/o weak stream and persistent leakage now.  Underwent cystoscopy with Dr. Castle (10/11/23)   Noting a severe BNC and was referred to me for consideration of reconstructive options.    Now s/p BNI, urethral dilation, lithotomy and cystolitholapaxy with me (01/11/24).   Post-operatively was seen in the ED (01/13/24) 2/2 pinedo catheter falling out.   Catheter was exchanged, and successfully passed TOV with Ekaterina (01/15/24).  Was readmitted 2/2 failure to thrive (01/23-01/28/24).   Urine culture was negative at that time.    SUBJECTIVE: HPI   TODAY (02/12/24)  Reports he has been doing well overall from a urologic standpoint.   Stating that he has been voiding very well, stream is much improved.   Remains very satisfied with the results of the procedure.   Denies any recent dysuria, gross hematuria, fevers or chills.     TO REVIEW: Last visit (11/16/23)  C/o persistent LUTS.  Since been evaluated by Cardiology s/p stress test.   Cleared to schedule surgery.   Has follow up with Cardiology Nov 27th.     Not on any antibiotics currently.  No longer taking oxybutynin or Trospium.  Continues xarelto for anticoagulation.  Will be able to stop prior to any urologic procedure.     TO REVIEW: Initial evaluation (10/18/23)  Initially presented to urology with h/o urinary retention.  Was evaluated by Dr. Sifuentes and underwent cystoscopy. (Nov  2022).  Afterwards, he developed a UTI and has had persistent leakage.  The UTI was treated but the leakage has persisted since.   This did not improve despite HoLEP (Jan 2023).  States that his weak stream and leakage has gradually worsened since the procedure.      Recently admitted (09/02 - 09/08/23) for UTI.  CT A&P concerning for mild perinephric stranding and mild hydronephrosis of transplant kidney. Noted multiple new calcified bladder stones and circumferential wall thickening and pericystic fat stranding. Findings concerning for pyelonephritis and was given IV antibiotics. Urine cultures came back negative. Switched to ciprofloxacin for total 10d course (completed 09/12/23).     PMH of diabetes, HTN, cirrhosis 2/2 ETOH use and chronic hep C, SLE, ESRD 2/2 HTN and T2DM, s/p renal transplant (2017), PE, GERD, HLD.    Past medical, surgical, family and social history in the chart was reviewed and is accurate including any additions to what is in this HPI.    Review of Systems   Constitutional: denies any unintentional weight loss or change in strength.  Integumentary: denies any rashes or pruritus.  Eyes: denies any double vision or eye pain.  Ear/Nose/Mouth/Throat: denies any nosebleeds or gum bleeds.  Cardiovascular: denies any chest pain or syncope.  Respiratory: denies hemoptysis.  Gastrointestinal: denies nausea or vomiting.  Musculoskeletal: denies muscle cramping or weakness.  Neurologic: denies convulsions or seizures.  Hematologic/Lymphatic: denies bleeding tendencies.  Endocrine: denies heat/cold intolerance.  All other systems have been reviewed and are negative unless otherwise noted in the HPI.    OBJECTIVE:  Visit Vitals  /78 (BP Location: Right arm, Patient Position: Sitting)   Pulse 67   Temp 36.3 °C (97.3 °F) (Temporal)     Physical Exam   Constitutional: No obvious distress.  Eyes: Non-injected conjunctiva, sclera clear, EOMI.  Ears/Nose/Mouth/Throat: No obvious drainage per ears or  nose.  Cardiovascular: Extremities are warm and well perfused. No edema, cyanosis or pallor.  Respiratory: No audible wheezing/stridor; respirations do not appear labored.  Gastrointestinal: Abdomen soft, not distended.  Musculoskeletal: Normal ROM of extremities.  Skin: No obvious rashes or open sores.  Neurologic: Alert and oriented, CN 2-12 grossly intact.  Psychiatric: Answers questions appropriately with normal affect.  Hematologic/Lymphatic/Immunologic: No obvious bruises or sites of spontaneous bleeding.  Genitourinary: No CVA tenderness, bladder not palpable.     Labs and imaging:  Lab Results   Component Value Date    WBC 6.4 01/28/2024    HGB 9.7 (L) 01/28/2024    HCT 28.4 (L) 01/28/2024     01/28/2024    CHOL 94 01/21/2023    TRIG 100 01/21/2023    HDL 26.3 (A) 01/21/2023    ALT 8 (L) 01/23/2024    AST 17 01/23/2024     01/28/2024    K 4.3 01/28/2024     01/28/2024    CREATININE 1.01 01/28/2024    BUN 17 01/28/2024    CO2 25 01/28/2024    PSA 0.80 06/29/2020    INR 5.1 (AA) 04/04/2023    HGBA1C 6.2 (H) 01/23/2024     ASSESSMENT:  Problem List Items Addressed This Visit       Benign prostatic hyperplasia with urinary obstruction    Relevant Orders    POCT UA (nonautomated) manually resulted    Portal hypertension (CMS/[facility])     Other Visit Diagnoses       Bladder neck contracture    -  Primary    Immunodeficiency due to drugs (CODE) (CMS/[facility])         PLAN:  S/p BNI, urethral dilation, lithotomy and cystolitholapaxy with me (01/11/24).   Passed TOV with Ekaterina. (01/15/24)  Since been voiding independently.  Noting his stream is much improved.    Remains very satisfied with the results of the procedure.   Plan to RTC in 3 months for reassessment.     All questions were answered to the patient’s satisfaction.  Patient agrees with the plan and wishes to proceed.    Scribed for Dr. Joaquin Gonzales by Hai Segovia.  I, Dr. Joaquin Gonzales have personally reviewed and agreed with the information  entered by the Virtual Scribe. 02/12/24.

## 2024-02-12 NOTE — LETTER
Patient: Albert Schwartz  : 1950    Encounter Date: 2024    PROGRESS NOTE    Subjective  Chief complaint: Albert Schwartz is a 73 y.o. male who is an acute skilled patient being seen and evaluated for weakness    HPI: is currently in therapy. He reports that he is getting stronger. Denies any chest pain or tightness. Reports that he feels bloated, requesting miralax 2 x day. Is agreeable to suppository.   The therapist reports that he is making good progress, has good support at home and his poor eyesight is only limitation  Is scheduled for urology appointment this afternoon.   HPI      Objective  Vital signs: 127/68-78-18-97.3     Physical Exam  Vitals and nursing note reviewed.   Constitutional:       General: He is not in acute distress.     Appearance: He is well-groomed and underweight.      Comments: Limited vision in both eyes    Eyes:      Extraocular Movements: Extraocular movements intact.   Pulmonary:      Effort: Pulmonary effort is normal.      Comments: Lungs clear   Abdominal:      Comments: Soft   Not distended   BS active    Musculoskeletal:      Cervical back: Neck supple.   Neurological:      Mental Status: He is alert.   Psychiatric:         Mood and Affect: Mood normal.         Behavior: Behavior is cooperative.         Assessment/Plan  Problem List Items Addressed This Visit       Hypertension, essential     Monitor blood pressure  Amlodipine  Metoprolol         Type 2 diabetes mellitus (CMS/HCC)     Glucoscans  Insulin  Monitor A1c         Deep vein thrombosis (DVT) of right lower extremity (CMS/HCC)     Xarelto  Monitor          Benign prostatic hyperplasia with urinary obstruction     Has appointment with urologist this afternoon          Constipation - Primary     Requested miralax- takes routinely, will increase to 2 x day.  Will add dulcolax suppository prn    Abdomen soft, BS x4           Medications, treatments, and labs reviewed  Continue medications and treatments as listed  in [facility]    YESSY Patel      Electronically Signed By: YESSY Patel   2/13/24  8:00 PM

## 2024-02-13 ENCOUNTER — NURSING HOME VISIT (OUTPATIENT)
Dept: POST ACUTE CARE | Facility: EXTERNAL LOCATION | Age: 74
End: 2024-02-13
Payer: COMMERCIAL

## 2024-02-13 DIAGNOSIS — Z94.0 KIDNEY REPLACED BY TRANSPLANT (HHS-HCC): ICD-10-CM

## 2024-02-13 DIAGNOSIS — I10 HYPERTENSION, ESSENTIAL: ICD-10-CM

## 2024-02-13 DIAGNOSIS — R53.1 WEAKNESS: ICD-10-CM

## 2024-02-13 DIAGNOSIS — E11.319 TYPE 2 DIABETES MELLITUS WITH RETINOPATHY, MACULAR EDEMA PRESENCE UNSPECIFIED, UNSPECIFIED LATERALITY, UNSPECIFIED RETINOPATHY SEVERITY, UNSPECIFIED WHETHER LONG TERM INSULIN USE (MULTI): ICD-10-CM

## 2024-02-13 LAB — BACTERIA UR CULT: NORMAL

## 2024-02-13 PROCEDURE — 99308 SBSQ NF CARE LOW MDM 20: CPT | Performed by: INTERNAL MEDICINE

## 2024-02-13 NOTE — LETTER
Patient: Albert Schwartz  : 1950    Encounter Date: 2024    PROGRESS NOTE    Subjective  Chief complaint: Albert Schwartz is a 73 y.o. male who is an acute skilled patient being seen and evaluated for weakness    HPI:  Patient has been at SNF for therapy. He utilizes omnicycle at level 1. Stair training completing 6 steps at [facility] with 2 rails. Patient is actively participating with skilled interventions with patient verbalizing she feels she is making progress. Nursing reports they have no new concerns.       Objective  Vital signs: 154/74,61,97%,     Physical Exam  Constitutional:       General: He is not in acute distress.  Eyes:      Extraocular Movements: Extraocular movements intact.   Cardiovascular:      Rate and Rhythm: Normal rate and regular rhythm.   Pulmonary:      Effort: Pulmonary effort is normal.      Breath sounds: Normal breath sounds.   Abdominal:      General: Bowel sounds are normal.      Palpations: Abdomen is soft.   Musculoskeletal:      Cervical back: Neck supple.      Right lower leg: No edema.      Left lower leg: No edema.   Neurological:      Mental Status: He is alert.      Motor: No weakness.   Psychiatric:         Mood and Affect: Mood normal.         Behavior: Behavior is cooperative.         Assessment/Plan  Problem List Items Addressed This Visit       Hypertension, essential     Monitor blood pressure  Amlodipine  Metoprolol         Type 2 diabetes mellitus (CMS/[facility])     Glucoscans  Insulin  Monitor A1c         Kidney replaced by transplant     Monitor labs  CellCept  Tacrolimus         Weakness     Continue to work toward established goals in therapy          Medications, treatments, and labs reviewed  Continue medications and treatments as listed in EMR    Scribe Attestation  I, Rich Cervantes   attest that this documentation has been prepared under the direction and in the presence of Caleb Mcdaniel MD.     Provider Attestation - Scribe  documentation  All medical record entries made by the Scribe were at my direction and personally dictated by me. I have reviewed the chart and agree that the record accurately reflects my personal performance of the history, physical exam, discussion and plan.   Caleb Mcdaniel MD      Electronically Signed By: Caleb Mcdaniel MD   2/13/24  4:03 PM

## 2024-02-13 NOTE — PROGRESS NOTES
PROGRESS NOTE    Subjective   Chief complaint: Albert Schwartz is a 73 y.o. male who is an acute skilled patient being seen and evaluated for weakness    HPI:  Patient has been at SNF for therapy. He utilizes omnicycle at level 1. Stair training completing 6 steps at [facility] with 2 rails. Patient is actively participating with skilled interventions with patient verbalizing she feels she is making progress. Nursing reports they have no new concerns.       Objective   Vital signs: 154/74,61,97%,     Physical Exam  Constitutional:       General: He is not in acute distress.  Eyes:      Extraocular Movements: Extraocular movements intact.   Cardiovascular:      Rate and Rhythm: Normal rate and regular rhythm.   Pulmonary:      Effort: Pulmonary effort is normal.      Breath sounds: Normal breath sounds.   Abdominal:      General: Bowel sounds are normal.      Palpations: Abdomen is soft.   Musculoskeletal:      Cervical back: Neck supple.      Right lower leg: No edema.      Left lower leg: No edema.   Neurological:      Mental Status: He is alert.      Motor: No weakness.   Psychiatric:         Mood and Affect: Mood normal.         Behavior: Behavior is cooperative.         Assessment/Plan   Problem List Items Addressed This Visit       Hypertension, essential     Monitor blood pressure  Amlodipine  Metoprolol         Type 2 diabetes mellitus (CMS/[facility])     Glucoscans  Insulin  Monitor A1c         Kidney replaced by transplant     Monitor labs  CellCept  Tacrolimus         Weakness     Continue to work toward established goals in therapy          Medications, treatments, and labs reviewed  Continue medications and treatments as listed in EMR    Scribe Attestation  I, Rich Cervantes   attest that this documentation has been prepared under the direction and in the presence of Caleb Mcdaniel MD.     Provider Attestation - Scribe documentation  All medical record entries made by the Scribe were at my direction and  personally dictated by me. I have reviewed the chart and agree that the record accurately reflects my personal performance of the history, physical exam, discussion and plan.   Caleb Mcdaniel MD

## 2024-02-14 ENCOUNTER — NURSING HOME VISIT (OUTPATIENT)
Dept: POST ACUTE CARE | Facility: EXTERNAL LOCATION | Age: 74
End: 2024-02-14
Payer: COMMERCIAL

## 2024-02-14 DIAGNOSIS — K59.01 SLOW TRANSIT CONSTIPATION: ICD-10-CM

## 2024-02-14 DIAGNOSIS — J44.89 OBSTRUCTIVE CHRONIC BRONCHITIS WITHOUT EXACERBATION (MULTI): ICD-10-CM

## 2024-02-14 DIAGNOSIS — E11.00 TYPE 2 DIABETES MELLITUS WITH HYPEROSMOLARITY WITHOUT COMA, WITH LONG-TERM CURRENT USE OF INSULIN (MULTI): ICD-10-CM

## 2024-02-14 DIAGNOSIS — I82.401 ACUTE DEEP VEIN THROMBOSIS (DVT) OF RIGHT LOWER EXTREMITY, UNSPECIFIED VEIN (MULTI): ICD-10-CM

## 2024-02-14 DIAGNOSIS — R53.1 WEAKNESS: Primary | ICD-10-CM

## 2024-02-14 DIAGNOSIS — Z79.4 TYPE 2 DIABETES MELLITUS WITH HYPEROSMOLARITY WITHOUT COMA, WITH LONG-TERM CURRENT USE OF INSULIN (MULTI): ICD-10-CM

## 2024-02-14 PROCEDURE — 99309 SBSQ NF CARE MODERATE MDM 30: CPT | Performed by: NURSE PRACTITIONER

## 2024-02-14 NOTE — ASSESSMENT & PLAN NOTE
Requested miralax- takes routinely, will increase to 2 x day.  Will add dulcolax suppository prn    Abdomen soft, BS x4

## 2024-02-14 NOTE — LETTER
Patient: Albert Schwartz  : 1950    Encounter Date: 2024    PROGRESS NOTE    Subjective  Chief complaint: Albert Schwartz is a 73 y.o. male who is an acute skilled patient being seen and evaluated for weakness    HPI: reports that he is feeling better. Reports that the suppository was helpful. Is scheduled for therapy following this visit. Denies any chest pain or tightness.   HPI      Objective  Vital signs: 128/76-77-18-97.4     Physical Exam  Vitals and nursing note reviewed.   Constitutional:       General: He is not in acute distress.     Appearance: He is well-groomed and underweight.   Eyes:      Extraocular Movements: Extraocular movements intact.      Comments: Limited vision   Reports predominately shadows    Cardiovascular:      Rate and Rhythm: Normal rate and regular rhythm.   Pulmonary:      Effort: Pulmonary effort is normal.      Breath sounds: Normal breath sounds.      Comments: Lungs clear   Abdominal:      General: Bowel sounds are normal.      Palpations: Abdomen is soft.      Comments: Soft   BS x 4    Musculoskeletal:      Cervical back: Neck supple.      Right lower leg: No edema.      Left lower leg: No edema.   Neurological:      Mental Status: He is alert.   Psychiatric:         Mood and Affect: Mood normal.         Behavior: Behavior is cooperative.         Assessment/Plan  Problem List Items Addressed This Visit       Obstructive chronic bronchitis without exacerbation      Lungs clear   Pulse oximetry 98%         Type 2 diabetes mellitus (CMS/formerly [facility])     Glucoscans  Insulin  Monitor A1c         Deep vein thrombosis (DVT) of right lower extremity (CMS/formerly [facility])     Xarelto  Monitor          Weakness - Primary     Continue to work toward established goals in therapy  Is making good progress          Constipation     Reports that he feels bloated   Request miralax 2 x day   Will order suppository prn   Abdomen soft BS x 4   Reports that the suppository helped          Medications,  treatments, and labs reviewed  Continue medications and treatments as listed in EMR    YESSY Patel      Electronically Signed By: YESSY Patel   2/15/24  8:50 PM

## 2024-02-14 NOTE — PROGRESS NOTES
PROGRESS NOTE    Subjective   Chief complaint: Albert Schwartz is a 73 y.o. male who is an acute skilled patient being seen and evaluated for weakness    HPI: is currently in therapy. He reports that he is getting stronger. Denies any chest pain or tightness. Reports that he feels bloated, requesting miralax 2 x day. Is agreeable to suppository.   The therapist reports that he is making good progress, has good support at home and his poor eyesight is only limitation  Is scheduled for urology appointment this afternoon.   HPI      Objective   Vital signs: 127/68-78-18-97.3     Physical Exam  Vitals and nursing note reviewed.   Constitutional:       General: He is not in acute distress.     Appearance: He is well-groomed and underweight.      Comments: Limited vision in both eyes    Eyes:      Extraocular Movements: Extraocular movements intact.   Pulmonary:      Effort: Pulmonary effort is normal.      Comments: Lungs clear   Abdominal:      Comments: Soft   Not distended   BS active    Musculoskeletal:      Cervical back: Neck supple.   Neurological:      Mental Status: He is alert.   Psychiatric:         Mood and Affect: Mood normal.         Behavior: Behavior is cooperative.         Assessment/Plan   Problem List Items Addressed This Visit       Hypertension, essential     Monitor blood pressure  Amlodipine  Metoprolol         Type 2 diabetes mellitus (CMS/HCC)     Glucoscans  Insulin  Monitor A1c         Deep vein thrombosis (DVT) of right lower extremity (CMS/HCC)     Xarelto  Monitor          Benign prostatic hyperplasia with urinary obstruction     Has appointment with urologist this afternoon          Constipation - Primary     Requested miralax- takes routinely, will increase to 2 x day.  Will add dulcolax suppository prn    Abdomen soft, BS x4           Medications, treatments, and labs reviewed  Continue medications and treatments as listed in EMR    Tanisha Whaley, APRN-CNP

## 2024-02-15 ENCOUNTER — NURSING HOME VISIT (OUTPATIENT)
Dept: POST ACUTE CARE | Facility: EXTERNAL LOCATION | Age: 74
End: 2024-02-15
Payer: COMMERCIAL

## 2024-02-15 DIAGNOSIS — I50.32 CHRONIC DIASTOLIC CONGESTIVE HEART FAILURE (MULTI): ICD-10-CM

## 2024-02-15 DIAGNOSIS — E11.319 TYPE 2 DIABETES MELLITUS WITH RETINOPATHY, MACULAR EDEMA PRESENCE UNSPECIFIED, UNSPECIFIED LATERALITY, UNSPECIFIED RETINOPATHY SEVERITY, UNSPECIFIED WHETHER LONG TERM INSULIN USE (MULTI): ICD-10-CM

## 2024-02-15 DIAGNOSIS — I82.4Y1 DEEP VEIN THROMBOSIS (DVT) OF PROXIMAL VEIN OF RIGHT LOWER EXTREMITY, UNSPECIFIED CHRONICITY (MULTI): ICD-10-CM

## 2024-02-15 DIAGNOSIS — I10 HYPERTENSION, ESSENTIAL: ICD-10-CM

## 2024-02-15 DIAGNOSIS — R53.1 WEAKNESS: Primary | ICD-10-CM

## 2024-02-15 DIAGNOSIS — M51.36 DEGENERATIVE LUMBAR DISC: ICD-10-CM

## 2024-02-15 PROCEDURE — 99308 SBSQ NF CARE LOW MDM 20: CPT | Performed by: INTERNAL MEDICINE

## 2024-02-15 NOTE — PROGRESS NOTES
PROGRESS NOTE    Subjective   Chief complaint: Albert Schwartz is a 73 y.o. male who is an acute skilled patient being seen and evaluated for weakness    HPI:  HPI  Patient is working in therapy due to generalized weakness.  Therapy is working with patient on stair training, able to perform 6/with 2 rails and CGA.  Patient seen and examined at bedside, appears to be in no apparent distress.  Denies chest pain or shortness of breath.  Afebrile.    Objective   Vital signs: 167/80, 98.2, 88, 14, 98%    Physical Exam  Constitutional:       General: He is not in acute distress.  Eyes:      Extraocular Movements: Extraocular movements intact.   Cardiovascular:      Rate and Rhythm: Normal rate and regular rhythm.   Pulmonary:      Effort: Pulmonary effort is normal.      Breath sounds: Normal breath sounds.   Abdominal:      General: Bowel sounds are normal.      Palpations: Abdomen is soft.   Musculoskeletal:      Cervical back: Neck supple.      Right lower leg: No edema.      Left lower leg: No edema.   Neurological:      Mental Status: He is alert.      Motor: No weakness.   Psychiatric:         Mood and Affect: Mood normal.         Behavior: Behavior is cooperative.         Assessment/Plan   Problem List Items Addressed This Visit       Hypertension, essential     Monitor blood pressure  Amlodipine  Metoprolol         Type 2 diabetes mellitus (CMS/HCC)     Glucoscans  Insulin  Monitor A1c         Degenerative lumbar disc     Therapy  Lidocaine patch  As needed meds         Deep vein thrombosis (DVT) of right lower extremity (CMS/HCC)     Xarelto  Monitor          Chronic diastolic congestive heart failure (CMS/HCC)     Stable, no shortness of breath  Monitor weight         Weakness - Primary     Continue to work towards goals in therapy          Medications, treatments, and labs reviewed  Continue medications and treatments as listed in EMR      Scribe Attestation  I, Rich Chaney   attest that this  documentation has been prepared under the direction and in the presence of Caleb Mcdaniel MD    Provider Attestation - Scribe documentation  All medical record entries made by the Scribe were at my direction and personally dictated by me. I have reviewed the chart and agree that the record accurately reflects my personal performance of the history, physical exam, discussion and plan.   Caleb Mcdaniel MD

## 2024-02-15 NOTE — LETTER
Patient: Albert Schwartz  : 1950    Encounter Date: 02/15/2024    PROGRESS NOTE    Subjective  Chief complaint: Albert Schwartz is a 73 y.o. male who is an acute skilled patient being seen and evaluated for weakness    HPI:  HPI  Patient is working in therapy due to generalized weakness.  Therapy is working with patient on stair training, able to perform 6/with 2 rails and CGA.  Patient seen and examined at bedside, appears to be in no apparent distress.  Denies chest pain or shortness of breath.  Afebrile.    Objective  Vital signs: 167/80, 98.2, 88, 14, 98%    Physical Exam  Constitutional:       General: He is not in acute distress.  Eyes:      Extraocular Movements: Extraocular movements intact.   Cardiovascular:      Rate and Rhythm: Normal rate and regular rhythm.   Pulmonary:      Effort: Pulmonary effort is normal.      Breath sounds: Normal breath sounds.   Abdominal:      General: Bowel sounds are normal.      Palpations: Abdomen is soft.   Musculoskeletal:      Cervical back: Neck supple.      Right lower leg: No edema.      Left lower leg: No edema.   Neurological:      Mental Status: He is alert.      Motor: No weakness.   Psychiatric:         Mood and Affect: Mood normal.         Behavior: Behavior is cooperative.         Assessment/Plan  Problem List Items Addressed This Visit       Hypertension, essential     Monitor blood pressure  Amlodipine  Metoprolol         Type 2 diabetes mellitus (CMS/HCC)     Glucoscans  Insulin  Monitor A1c         Degenerative lumbar disc     Therapy  Lidocaine patch  As needed meds         Deep vein thrombosis (DVT) of right lower extremity (CMS/HCC)     Xarelto  Monitor          Chronic diastolic congestive heart failure (CMS/HCC)     Stable, no shortness of breath  Monitor weight         Weakness - Primary     Continue to work towards goals in therapy          Medications, treatments, and labs reviewed  Continue medications and treatments as listed in EMR      Scribe  Attestation  I, Rich Chaney   attest that this documentation has been prepared under the direction and in the presence of Caleb Mcdaniel MD    Provider Attestation - Scribe documentation  All medical record entries made by the Scribe were at my direction and personally dictated by me. I have reviewed the chart and agree that the record accurately reflects my personal performance of the history, physical exam, discussion and plan.   Caleb Mcdaniel MD        Electronically Signed By: Caleb Mcdaniel MD   2/15/24  3:41 PM

## 2024-02-15 NOTE — ASSESSMENT & PLAN NOTE
Glucoscans  Insulin  Monitor A1c   methylphenidate (RITALIN) 20 MG tablet   [facility]/PHARMACY #[id number]- [address] [facility], [address] [place] [address] Ave - F [phone]

## 2024-02-16 NOTE — ASSESSMENT & PLAN NOTE
Reports that he feels bloated   Request miralax 2 x day   Will order suppository prn   Abdomen soft BS x 4   Reports that the suppository helped

## 2024-02-16 NOTE — PROGRESS NOTES
PROGRESS NOTE    Subjective   Chief complaint: Albert Schwartz is a 73 y.o. male who is an acute skilled patient being seen and evaluated for weakness    HPI: reports that he is feeling better. Reports that the suppository was helpful. Is scheduled for therapy following this visit. Denies any chest pain or tightness.   HPI      Objective   Vital signs: 128/76-77-18-97.4     Physical Exam  Vitals and nursing note reviewed.   Constitutional:       General: He is not in acute distress.     Appearance: He is well-groomed and underweight.   Eyes:      Extraocular Movements: Extraocular movements intact.      Comments: Limited vision   Reports predominately shadows    Cardiovascular:      Rate and Rhythm: Normal rate and regular rhythm.   Pulmonary:      Effort: Pulmonary effort is normal.      Breath sounds: Normal breath sounds.      Comments: Lungs clear   Abdominal:      General: Bowel sounds are normal.      Palpations: Abdomen is soft.      Comments: Soft   BS x 4    Musculoskeletal:      Cervical back: Neck supple.      Right lower leg: No edema.      Left lower leg: No edema.   Neurological:      Mental Status: He is alert.   Psychiatric:         Mood and Affect: Mood normal.         Behavior: Behavior is cooperative.         Assessment/Plan   Problem List Items Addressed This Visit       Obstructive chronic bronchitis without exacerbation      Lungs clear   Pulse oximetry 98%         Type 2 diabetes mellitus (CMS/HCC)     Glucoscans  Insulin  Monitor A1c         Deep vein thrombosis (DVT) of right lower extremity (CMS/HCC)     Xarelto  Monitor          Weakness - Primary     Continue to work toward established goals in therapy  Is making good progress          Constipation     Reports that he feels bloated   Request miralax 2 x day   Will order suppository prn   Abdomen soft BS x 4   Reports that the suppository helped          Medications, treatments, and labs reviewed  Continue medications and treatments as listed  in EMR    Tanisha Whaley, APRN-CNP

## 2024-03-18 ENCOUNTER — SPECIALTY PHARMACY (OUTPATIENT)
Dept: PHARMACY | Facility: CLINIC | Age: 74
End: 2024-03-18

## 2024-04-12 ENCOUNTER — TELEPHONE (OUTPATIENT)
Dept: TRANSPLANT | Facility: HOSPITAL | Age: 74
End: 2024-04-12

## 2024-04-12 ENCOUNTER — LAB (OUTPATIENT)
Dept: LAB | Facility: LAB | Age: 74
End: 2024-04-12
Payer: COMMERCIAL

## 2024-04-12 DIAGNOSIS — Z94.0 KIDNEY REPLACED BY TRANSPLANT (HHS-HCC): ICD-10-CM

## 2024-04-12 LAB
CREAT UR-MCNC: 74.6 MG/DL (ref 20–370)
PROT UR-ACNC: 34 MG/DL (ref 5–25)
PROT/CREAT UR: 0.46 MG/MG CREAT (ref 0–0.17)

## 2024-04-12 PROCEDURE — 84156 ASSAY OF PROTEIN URINE: CPT

## 2024-04-12 PROCEDURE — 82570 ASSAY OF URINE CREATININE: CPT

## 2024-04-15 ENCOUNTER — TELEPHONE (OUTPATIENT)
Dept: TRANSPLANT | Facility: HOSPITAL | Age: 74
End: 2024-04-15
Payer: COMMERCIAL

## 2024-04-16 ENCOUNTER — LAB (OUTPATIENT)
Dept: LAB | Facility: LAB | Age: 74
End: 2024-04-16
Payer: COMMERCIAL

## 2024-04-16 ENCOUNTER — OFFICE VISIT (OUTPATIENT)
Dept: TRANSPLANT | Facility: HOSPITAL | Age: 74
End: 2024-04-16
Payer: COMMERCIAL

## 2024-04-16 VITALS
TEMPERATURE: 98.2 F | DIASTOLIC BLOOD PRESSURE: 72 MMHG | SYSTOLIC BLOOD PRESSURE: 154 MMHG | HEART RATE: 67 BPM | BODY MASS INDEX: 27.35 KG/M2 | OXYGEN SATURATION: 98 % | WEIGHT: 196.1 LBS

## 2024-04-16 DIAGNOSIS — Z92.25 PERSONAL HISTORY OF IMMUNOSUPRESSION THERAPY: Primary | ICD-10-CM

## 2024-04-16 DIAGNOSIS — Z94.0 KIDNEY REPLACED BY TRANSPLANT (HHS-HCC): ICD-10-CM

## 2024-04-16 DIAGNOSIS — Z51.81 THERAPEUTIC DRUG MONITORING: ICD-10-CM

## 2024-04-16 DIAGNOSIS — N18.31 STAGE 3A CHRONIC KIDNEY DISEASE (MULTI): ICD-10-CM

## 2024-04-16 LAB
25(OH)D3 SERPL-MCNC: 58 NG/ML (ref 30–100)
ALBUMIN SERPL BCP-MCNC: 3.9 G/DL (ref 3.4–5)
ANION GAP SERPL CALC-SCNC: 12 MMOL/L (ref 10–20)
BUN SERPL-MCNC: 16 MG/DL (ref 6–23)
CALCIUM SERPL-MCNC: 9.6 MG/DL (ref 8.6–10.6)
CHLORIDE SERPL-SCNC: 104 MMOL/L (ref 98–107)
CO2 SERPL-SCNC: 28 MMOL/L (ref 21–32)
CREAT SERPL-MCNC: 0.83 MG/DL (ref 0.5–1.3)
CREAT UR-MCNC: 188.9 MG/DL (ref 20–370)
EGFRCR SERPLBLD CKD-EPI 2021: >90 ML/MIN/1.73M*2
ERYTHROCYTE [DISTWIDTH] IN BLOOD BY AUTOMATED COUNT: 18.8 % (ref 11.5–14.5)
GLUCOSE SERPL-MCNC: 112 MG/DL (ref 74–99)
HCT VFR BLD AUTO: 34 % (ref 41–52)
HGB BLD-MCNC: 11.6 G/DL (ref 13.5–17.5)
MAGNESIUM SERPL-MCNC: 1.51 MG/DL (ref 1.6–2.4)
MCH RBC QN AUTO: 25.7 PG (ref 26–34)
MCHC RBC AUTO-ENTMCNC: 34.1 G/DL (ref 32–36)
MCV RBC AUTO: 75 FL (ref 80–100)
NRBC BLD-RTO: 0 /100 WBCS (ref 0–0)
PHOSPHATE SERPL-MCNC: 3.5 MG/DL (ref 2.5–4.9)
PLATELET # BLD AUTO: 364 X10*3/UL (ref 150–450)
POTASSIUM SERPL-SCNC: 3.8 MMOL/L (ref 3.5–5.3)
PROT UR-ACNC: 123 MG/DL (ref 5–25)
PROT/CREAT UR: 0.65 MG/MG CREAT (ref 0–0.17)
PTH-INTACT SERPL-MCNC: 75.4 PG/ML (ref 18.5–88)
RBC # BLD AUTO: 4.52 X10*6/UL (ref 4.5–5.9)
SODIUM SERPL-SCNC: 140 MMOL/L (ref 136–145)
TACROLIMUS BLD-MCNC: 6.8 NG/ML
WBC # BLD AUTO: 5.5 X10*3/UL (ref 4.4–11.3)

## 2024-04-16 PROCEDURE — 80069 RENAL FUNCTION PANEL: CPT

## 2024-04-16 PROCEDURE — 85027 COMPLETE CBC AUTOMATED: CPT

## 2024-04-16 PROCEDURE — 80197 ASSAY OF TACROLIMUS: CPT

## 2024-04-16 PROCEDURE — 83735 ASSAY OF MAGNESIUM: CPT

## 2024-04-16 PROCEDURE — 82306 VITAMIN D 25 HYDROXY: CPT

## 2024-04-16 PROCEDURE — 84156 ASSAY OF PROTEIN URINE: CPT

## 2024-04-16 PROCEDURE — 36415 COLL VENOUS BLD VENIPUNCTURE: CPT

## 2024-04-16 PROCEDURE — 82570 ASSAY OF URINE CREATININE: CPT

## 2024-04-16 PROCEDURE — 83970 ASSAY OF PARATHORMONE: CPT

## 2024-04-16 RX ORDER — TACROLIMUS 1 MG/1
1 CAPSULE ORAL 2 TIMES DAILY
Qty: 180 CAPSULE | Refills: 3 | Status: SHIPPED | OUTPATIENT
Start: 2024-04-16 | End: 2025-04-16

## 2024-04-16 RX ORDER — AMLODIPINE BESYLATE 2.5 MG/1
5 TABLET ORAL DAILY
Qty: 180 TABLET | Refills: 3 | Status: SHIPPED | OUTPATIENT
Start: 2024-04-16 | End: 2025-04-16

## 2024-04-16 ASSESSMENT — ENCOUNTER SYMPTOMS
HEADACHES: 0
FREQUENCY: 0
CONFUSION: 0
NAUSEA: 0
BACK PAIN: 0
PALPITATIONS: 0
CHEST TIGHTNESS: 0
VOMITING: 0
NERVOUS/ANXIOUS: 0
DIARRHEA: 0
HEMATURIA: 0
SHORTNESS OF BREATH: 0
COUGH: 0
ABDOMINAL DISTENTION: 0

## 2024-04-16 NOTE — Clinical Note
Increased Amlodipine to 5mg daily Tacrolimus 2mg BID was sent to [facility] Dermatology referral Stop Vit d Added vit D and PTH Labs every 3 m RTC in 6 m

## 2024-04-16 NOTE — PATIENT INSTRUCTIONS
Increased Amlodipine to 5mg daily  Tacrolimus 2mg BID was sent to [facility]  Dermatology referral  Stop Vit d  Added vit D and PTH  Labs every 3 m  RTC in 6 m

## 2024-04-16 NOTE — PROGRESS NOTES
Albert Schwartz   73 y.o. with a history of ESRD secondary to hypertension and diabetes s/p DDKT on 2/19/2017.  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  He had been on liver transplant list in 2015 and was removed from the list due to improved condition per the record..    Interim history: Denied any complaints on today's visit.  Have concerns about lab orders being fell OFF .  Blood pressures are mildly elevated otherwise I explained the lab reports today with his family.      Review of Systems   Respiratory:  Negative for cough, chest tightness and shortness of breath.    Cardiovascular:  Negative for chest pain, palpitations and leg swelling.   Gastrointestinal:  Negative for abdominal distention, diarrhea, nausea and vomiting.   Genitourinary:  Negative for frequency, hematuria and urgency.   Musculoskeletal:  Negative for back pain.   Neurological:  Negative for headaches.   Psychiatric/Behavioral:  Negative for confusion. The patient is not nervous/anxious.         Objective:  Visit Vitals  /72   Pulse 67   Temp 36.8 °C (98.2 °F)   Wt 89 kg (196 lb 1.6 oz)   SpO2 98%   BMI 27.35 kg/m²   Smoking Status Former   BSA 2.11 m²      Physical Exam  HENT:      Head: Normocephalic and atraumatic.      Nose: Nose normal.      Mouth/Throat:      Mouth: Mucous membranes are moist.   Eyes:      Extraocular Movements: Extraocular movements intact.      Pupils: Pupils are equal, round, and reactive to light.   Cardiovascular:      Rate and Rhythm: Normal rate.      Pulses: Normal pulses.      Heart sounds: Normal heart sounds.   Pulmonary:      Effort: Pulmonary effort is normal.   Abdominal:      Palpations: Abdomen is soft.      Tenderness: There is no abdominal tenderness. There is no guarding.    Musculoskeletal:         General: Normal range of motion.      Cervical back: Normal range of motion.   Skin:     General: Skin is warm.   Neurological:      General: No focal deficit present.      Mental Status: Mental status is at baseline.   Psychiatric:         Mood and Affect: Mood normal.            Current Outpatient Medications:     amLODIPine (Norvasc) 2.5 mg tablet, TAKE ONE (1) TABLET BY MOUTH ONCE DAILY., Disp: 90 tablet, Rfl: 3    artificial tears, dextran-hypomel-glycerin, 0.1-0.3-0.2 % ophthalmic solution, Administer 2 drops into both eyes every 4 hours if needed for dry eyes., Disp: , Rfl:     atorvastatin (Lipitor) 20 mg tablet, Take 1 tablet (20 mg) by mouth once daily., Disp: , Rfl:     blood sugar diagnostic (Accu-Chek Tori Plus test strp) strip, Use as instructed TWICE DAILY FOR TESTING FOR nON INSULIN DEPENDENT DIABETES E11.319, Disp: , Rfl:     cholecalciferol (Vitamin D-3) 1,250 mcg (50,000 unit) capsule, Take 1 capsule (50,000 Units) by mouth once a week., Disp: , Rfl:     insulin degludec (Tresiba FlexTouch) 100 unit/mL (3 mL) injection, Inject 30 Units under the skin once daily in the morning., Disp: , Rfl:     insulin lispro (HumaLOG) 100 unit/mL injection, Inject 0.1 mL (10 Units) under the skin once daily at bedtime., Disp: , Rfl:     metoprolol succinate XL (Toprol-XL) 50 mg 24 hr tablet, TAKE 1 TABLET BY MOUTH EVERY DAY, Disp: 90 tablet, Rfl: 3    mycophenolate (Cellcept) 250 mg capsule, TAKE TWO (2) CAPSULES BY MOUTH TWICE DAILY., Disp: 360 capsule, Rfl: 3    OneTouch UltraSoft Lancets misc, , Disp: , Rfl:     phenazopyridine (Pyridium) 200 mg tablet, Take 1 tablet (200 mg) by mouth 3 times a day as needed for bladder spasms., Disp: , Rfl:     triamcinolone (Kenalog) 0.1 % cream, Apply 1 Application topically 3 times a day as needed. to affected area 2-3 times a day, Disp: , Rfl:     Xarelto 20 mg tablet, Take 1 tablet (20 mg) by mouth once daily in the evening. Take with meals.,  Disp: 30 tablet, Rfl: 0     [unfilled]     No images are attached to the encounter.     Assessment and Plan :Albert Schwartz 73 y.o. with a history of ESRD secondary to hypertension and diabetes s/p DDKT on 2/19/2017.  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  He had been on liver transplant list in 2015 and was removed from the list due to improved condition per the record..    Interim history: Denied any complaints on today's visit.  Have concerns about lab orders being fell OFF .  Blood pressures are mildly elevated otherwise I explained the lab reports today with his family.    Allograft function: Stable creatinine in the range of 0.8-1.01, stable ultralights.  Last UPC is mildly up trended 0.65 patient is allergic to ACE inhibitor or ARB.  Will continue to monitor for now    Immunosuppression: Continue with the mycophenolate 500 twice daily, tacrolimus 2 mg twice daily last levels is from January we will follow-up with the level today to make further adjustments aim levels of 5-8    Hemodynamics: Blood pressures mildly elevated increase amlodipine to 5 mg daily, continue with the metoprolol 50 mg daily.    No anemia or leukopenia noticed on recent labs.    Bone mineral disease: Calcium and phosphorus levels are optimal recommended to stop vitamin D supplementation check vitamin D and PTH levels    General health care: Recommended age-appropriate screening, COVID shots, annual dermatology visits.    Labs every 3 months and follow-up in 6 months.    Sherwin South MD    Notes created by Estrella -Please excuse the Typos .

## 2024-04-30 ENCOUNTER — SPECIALTY PHARMACY (OUTPATIENT)
Dept: PHARMACY | Facility: CLINIC | Age: 74
End: 2024-04-30

## 2024-04-30 RX ORDER — AMLODIPINE BESYLATE 2.5 MG/1
2.5 TABLET ORAL DAILY
Qty: 90 TABLET | Refills: 3 | Status: CANCELLED | OUTPATIENT
Start: 2024-04-30 | End: 2025-04-30

## 2024-04-30 RX ORDER — TACROLIMUS 0.5 MG/1
1 CAPSULE ORAL EVERY 12 HOURS
Qty: 360 CAPSULE | Refills: 3 | Status: CANCELLED | OUTPATIENT
Start: 2024-04-30 | End: 2025-04-30

## 2024-05-14 ENCOUNTER — SPECIALTY PHARMACY (OUTPATIENT)
Dept: PHARMACY | Facility: CLINIC | Age: 74
End: 2024-05-14

## 2024-05-16 ENCOUNTER — TELEMEDICINE (OUTPATIENT)
Dept: UROLOGY | Facility: CLINIC | Age: 74
End: 2024-05-16
Payer: COMMERCIAL

## 2024-05-16 DIAGNOSIS — N52.39 OTHER POST-PROCEDURAL ERECTILE DYSFUNCTION: Primary | ICD-10-CM

## 2024-05-16 PROCEDURE — 3060F POS MICROALBUMINURIA REV: CPT | Performed by: STUDENT IN AN ORGANIZED HEALTH CARE EDUCATION/TRAINING PROGRAM

## 2024-05-16 PROCEDURE — 99214 OFFICE O/P EST MOD 30 MIN: CPT | Performed by: STUDENT IN AN ORGANIZED HEALTH CARE EDUCATION/TRAINING PROGRAM

## 2024-05-16 PROCEDURE — 3044F HG A1C LEVEL LT 7.0%: CPT | Performed by: STUDENT IN AN ORGANIZED HEALTH CARE EDUCATION/TRAINING PROGRAM

## 2024-05-16 PROCEDURE — 1159F MED LIST DOCD IN RCRD: CPT | Performed by: STUDENT IN AN ORGANIZED HEALTH CARE EDUCATION/TRAINING PROGRAM

## 2024-05-16 PROCEDURE — 1160F RVW MEDS BY RX/DR IN RCRD: CPT | Performed by: STUDENT IN AN ORGANIZED HEALTH CARE EDUCATION/TRAINING PROGRAM

## 2024-05-16 RX ORDER — SILDENAFIL 100 MG/1
100 TABLET, FILM COATED ORAL AS NEEDED
Qty: 12 TABLET | Refills: 3 | Status: SHIPPED | OUTPATIENT
Start: 2024-05-16 | End: 2024-06-15

## 2024-05-16 NOTE — PROGRESS NOTES
Scribed for Dr. Joaquin Gonzales by Hai Segovia. I, Dr. Joaquin Gonzales have personally reviewed and agreed with the information entered by the Virtual Scribe. This visit was completed via telemedicine. All issues as below were discussed and addressed but no physical exam was performed unless allowed by visual confirmation. If it was felt that the patient should be evaluated in clinic, then they were directed there. Patient verbal consented to the visit. 05/16/24.    ASSESSMENT:  Problem List Items Addressed This Visit    None  Visit Diagnoses       Other post-procedural erectile dysfunction    -  Primary    Relevant Medications    sildenafil (Viagra) 100 mg tablet           PLAN:  #BNC  S/p BNI, urethral dilation, lithotomy and cystolitholapaxy with me (01/11/24).   Stream much improved, and emptying well.   Remained satisfied with results of procedure.   Advised on precautionary instructions.   Reviewed signs of recurrence, encouraged to call if he develops.   Continue to monitor.     #Erectile dysfunction.  #Dysorgasmia, male  Opts to trial course of Viagra 100 mg PRN.   Risks, benefits, and alternatives discussed.   RTC in 6-8 weeks for med check.   Next visit, if no improvement of his dysorgasmia, can consider referral to PFPT.     Patient was seen today with the complaint of erectile dysfunction (ED). I went over the definition of ED, which is the inability to obtain or maintain an erection sufficient for satisfactory sexual activity. I outlined that erectile function is a complex interplay of neural vascular, hormonal and psychological factors. Disruption in any of these pathways may lead to ED. In terms of treatment options; PDE5i (Viagra, Cialis, etc.), intracavernosal injections (ICI), vacuum erection devices (ASHLEY), and penile implants (IPP) were discussed in detail.     Rx for Sildenafil (Viagra)  mg provided.  Advised on all potential side effects.  Instructed to take 1 hour prior to sex on an empty  stomach.  Sexual stimulation is still required in order to work.   Reviewed utilizing [employer] for discounts and affordability.  Contraindications include avoiding nitrates and not taking this medication within 4 hours of taking tamsulosin.    All questions were answered to the patient’s satisfaction.  Patient agrees with the plan and wishes to proceed.  Continue follow-up for ongoing care of his chronic medical conditions.       History of Present Illness (HPI):  Albert presents virtually for a post-op visit.  The patient’s EMR has been reviewed.  Lives in [place], [place].  Currently living at an skilled living facility.   Previously evaluated by Dr. Sifuentes, Dr. Luo and Dr. Castle.     H/o BPH w/ LUTS, s/p HoLEP with Dr. Sifuentes (Jan 2023).  H/o BNC, urinary incontinence, urethral stricture, rUTIs, pyelonephritis.   Hx of ESRD 2/2 HTN and T2DM, s/p renal transplant. (2017)     Primarily followed for his BPH and BNC.  S/p HoLEP with Dr. Sifuentes (Jan 2023).   Post-op c/b PE, s/p IVC filter placement and removal.  Since then has developed recurrent obstructive symptoms.   C/o weak stream and persistent leakage.  Underwent cystoscopy with Dr. Castle (10/11/23)   Noting a severe BNC and was referred to me for consideration of reconstructive options.     Now s/p BNI, urethral dilation, lithotomy and cystolitholapaxy with me (01/11/24).   Post-op was seen in the ED (01/13/24) 2/2 pinedo catheter falling out.   Catheter was exchanged; followed by passed TOV with Ekaterina (01/15/24).  Was readmitted 2/2 failure to thrive. (01/23-01/28/24)  Urine culture was negative at that time.    Last visit, had been doing well overall from urologic standpoint.   Stream much improved, and emptying well.   Remained satisfied with results of procedure.   Presents virtually today for follow-up.     TODAY: (05/16/24)  Reports he has been doing well overall.   Denies any acute or worsening urinary issues.   However, c/o persistent erectile dysfunction  post-op.   Receptive to trying medications for his ED.   C/o persistent discomfort during orgasm//ejaculation as well.     TODAY (02/12/24)  Reports he has been doing well overall from a urologic standpoint.   Stating that he has been voiding very well, stream is much improved.   Remains very satisfied with the results of the procedure.   Denies any recent dysuria, gross hematuria, fevers or chills.      TO REVIEW: Last visit (11/16/23)  C/o persistent LUTS.  Since been evaluated by Cardiology s/p stress test.   Cleared to schedule surgery.   Has follow up with Cardiology Nov 27th.      Not on any antibiotics currently.  No longer taking oxybutynin or Trospium.  Continues xarelto for anticoagulation.  Will be able to stop prior to any urologic procedure.      TO REVIEW: Initial evaluation (10/18/23)  Initially presented to urology with h/o urinary retention.  Was evaluated by Dr. Sifuentes and underwent cystoscopy. (Nov 2022).  Afterwards, he developed a UTI and has had persistent leakage.  The UTI was treated but the leakage has persisted since.   This did not improve despite HoLEP (Jan 2023).  States that his weak stream and leakage has gradually worsened since the procedure.      Recently admitted (09/02 - 09/08/23) for UTI.  CT A&P concerning for mild perinephric stranding and mild hydronephrosis of transplant kidney. Noted multiple new calcified bladder stones and circumferential wall thickening and pericystic fat stranding. Findings concerning for pyelonephritis and was given IV antibiotics. Urine cultures came back negative. Switched to ciprofloxacin for total 10d course (completed 09/12/23).     PMH of diabetes, HTN, cirrhosis 2/2 ETOH use and chronic hep C, SLE, ESRD 2/2 HTN and T2DM, s/p renal transplant (2017), PE, GERD, HLD.    Past Medical History:   Diagnosis Date    Alcohol abuse, in remission     Arthritis     Bladder neck contracture     Scheduled for surgery with Dr. Gonzales on 12/15/23    Blindness      BPH (benign prostatic hyperplasia)     Chronic diastolic (congestive) heart failure (Multi) 01/18/2018    Chronic diastolic congestive heart failure    Chronic hepatitis C (Multi)     Cirrhosis (Multi)     2/2 ETOH use    Diabetes mellitus (Multi)     Disorder of male genital organs, unspecified 12/11/2018    Testicular abnormality    ESRD (end stage renal disease) (Multi)     S/P renal transplant in 2017, F/W Nephrology Dr. Liu, LOV 10/16/2023    GERD (gastroesophageal reflux disease)     Hyperlipidemia     Hypertension     Immunodeficiency, unspecified (Multi) 01/18/2018    Immunosuppression    Kidney transplant status ([facility]-H[facility])     Other ascites     Ascites    Other specified personal risk factors, not elsewhere classified 01/18/2018    At risk for infection transmitted from donor    PE (pulmonary thromboembolism) (Multi)     on Eliquis, F/W cards    Peripheral vascular disease, unspecified (CMS-HCC) 01/18/2018    PAD (peripheral artery disease)     Past Surgical History:   Procedure Laterality Date    CATARACT EXTRACTION  01/18/2018    Cataract Extraction    CYSTOSCOPY      ESOPHAGOGASTRODUODENOSCOPY      FL GUIDED ABDOMINAL PARACENTESIS  03/30/2015    FL GUIDED ABDOMINAL PARACENTESIS 3/30/2015 [facility] AIB LEGACY    FL GUIDED ABDOMINAL PARACENTESIS  04/14/2015    FL GUIDED ABDOMINAL PARACENTESIS 4/14/2015 [facility] INPATIENT LEGACY    FL GUIDED ABDOMINAL PARACENTESIS  08/28/2015    FL GUIDED ABDOMINAL PARACENTESIS 8/28/2015 [facility] AIB LEGACY    KIDNEY SURGERY  06/02/2020    Kidney Surgery    OTHER SURGICAL HISTORY  08/18/2014    Brain Surgery    OTHER SURGICAL HISTORY  06/30/2020    Renal Transplant    OTHER SURGICAL HISTORY  06/02/2020    Incisional Hernia Repair    SPLENECTOMY, TOTAL  06/02/2020    Splenectomy    US GUIDED ABDOMINAL PARACENTESIS  03/20/2014    US GUIDED ABDOMINAL PARACENTESIS 3/20/2014 [facility] AIB LEGACY    US GUIDED ABDOMINAL PARACENTESIS  08/14/2014    US GUIDED ABDOMINAL PARACENTESIS 8/14/2014 [facility]  AIB LEGACY    US GUIDED ABDOMINAL PARACENTESIS  2014    US GUIDED ABDOMINAL PARACENTESIS 2014 [facility] AIB LEGACY    US GUIDED ABDOMINAL PARACENTESIS  2014    US GUIDED ABDOMINAL PARACENTESIS 2014 [email or web address] CLINICAL LEGACY    US GUIDED ABDOMINAL PARACENTESIS  2015    US GUIDED ABDOMINAL PARACENTESIS 2015 [facility] ANCILLARY LEGACY    US GUIDED ABDOMINAL PARACENTESIS  2015    US GUIDED ABDOMINAL PARACENTESIS 2015 [facility] AIB LEGACY    US GUIDED ABDOMINAL PARACENTESIS  2015    US GUIDED ABDOMINAL PARACENTESIS 3/18/2015 [facility] AIB LEGACY    US GUIDED ABDOMINAL PARACENTESIS  2015    US GUIDED ABDOMINAL PARACENTESIS 2015 [facility] INPATIENT LEGACY    US GUIDED ABDOMINAL PARACENTESIS  2015    US GUIDED ABDOMINAL PARACENTESIS 2015 [facility] AIB LEGACY    US GUIDED ABDOMINAL PARACENTESIS  10/12/2015    US GUIDED ABDOMINAL PARACENTESIS 10/12/2015 [facility] [[email or web address] CLINICAL LEGACY    US GUIDED ABDOMINAL PARACENTESIS  10/19/2015    US GUIDED ABDOMINAL PARACENTESIS 10/19/2015 [facility] [[email or web address] CLINICAL LEGACY     Family History   Problem Relation Name Age of Onset    Colon cancer Sister      Heart disease Brother       Social History     Tobacco Use   Smoking Status Former    Current packs/day: 0.00    Average packs/day: 0.5 packs/day for 15.0 years (7.5 ttl pk-yrs)    Types: Cigarettes    Start date:     Quit date:     Years since quittin.3   Smokeless Tobacco Never     Current Outpatient Medications   Medication Sig Dispense Refill    amLODIPine (Norvasc) 2.5 mg tablet Take 2 tablets (5 mg) by mouth once daily. 180 tablet 3    artificial tears, dextran-hypomel-glycerin, 0.1-0.3-0.2 % ophthalmic solution Administer 2 drops into both eyes every 4 hours if needed for dry eyes.      atorvastatin (Lipitor) 20 mg tablet Take 1 tablet (20 mg) by mouth once daily.      blood sugar diagnostic (Accu-Chek Tori Plus test strp) strip Use as instructed TWICE DAILY FOR TESTING FOR nON INSULIN DEPENDENT DIABETES  "E11.319      cholecalciferol (Vitamin D-3) 1,250 mcg (50,000 unit) capsule Take 1 capsule (50,000 Units) by mouth once a week.      insulin degludec (Tresiba FlexTouch) 100 unit/mL (3 mL) injection Inject 30 Units under the skin once daily in the morning.      insulin lispro (HumaLOG) 100 unit/mL injection Inject 0.1 mL (10 Units) under the skin once daily at bedtime.      metoprolol succinate XL (Toprol-XL) 50 mg 24 hr tablet TAKE 1 TABLET BY MOUTH EVERY DAY 90 tablet 3    OneTouch UltraSoft Lancets misc       phenazopyridine (Pyridium) 200 mg tablet Take 1 tablet (200 mg) by mouth 3 times a day as needed for bladder spasms.      sildenafil (Viagra) 100 mg tablet Take 1 tablet (100 mg) by mouth if needed for erectile dysfunction. 12 tablet 3    tacrolimus (Prograf) 1 mg capsule Take 1 capsule (1 mg) by mouth 2 times a day. 180 capsule 3    triamcinolone (Kenalog) 0.1 % cream Apply 1 Application topically 3 times a day as needed. to affected area 2-3 times a day      Xarelto 20 mg tablet TAKE 1 TABLET DAILY TAKE WITH A FULL MEAL 90 tablet 3     No current facility-administered medications for this visit.     Allergies   Allergen Reactions    Penicillins Anaphylaxis, Hives, Shortness of breath and Swelling     swollen tongue    Ace Inhibitors Other     ESRD    Arb-Angiotensin Receptor Antagonist Other     ESRD    Influenza Tri-Split 2007 Vac Other    Oxycodone-Acetaminophen Other     \"ZONED OUT\"    Penicillin Swelling    Ceftriaxone Itching and Rash     pt endorces itching to face and abd     Past medical, surgical, family and social history in the chart was reviewed and is accurate including any additions to what is in this HPI.    REVIEW OF SYSTEMS (ROS):   Constitutional: denies any unintentional weight loss or change in strength.  Integumentary: denies any rashes or pruritus.  Eyes: denies any double vision or eye pain.  Ear/Nose/Mouth/Throat: denies any nosebleeds or gum bleeds.  Cardiovascular: denies any chest " pain or syncope.  Respiratory: denies hemoptysis.  Gastrointestinal: denies nausea or vomiting.  Musculoskeletal: denies muscle cramping or weakness.  Neurologic: denies convulsions or seizures.  Hematologic/Lymphatic: denies bleeding tendencies.  Endocrine: denies heat/cold intolerance.  All other systems have been reviewed and are negative unless otherwise noted in the HPI.     OBJECTIVE:  There were no vitals taken for this visit.  PHYSICAL EXAM:  PHYSICAL EXAM LIMITED 2/2 BEING A TELEHEALTH VISIT.     Labs and imaging:  Lab Results   Component Value Date    WBC 5.5 04/16/2024    HGB 11.6 (L) 04/16/2024    HCT 34.0 (L) 04/16/2024     04/16/2024    CHOL 94 01/21/2023    TRIG 100 01/21/2023    HDL 26.3 (A) 01/21/2023    ALT 8 (L) 01/23/2024    AST 17 01/23/2024     04/16/2024    K 3.8 04/16/2024     04/16/2024    CREATININE 0.83 04/16/2024    BUN 16 04/16/2024    CO2 28 04/16/2024    PSA 0.80 06/29/2020    INR 5.1 (AA) 04/04/2023    HGBA1C 6.2 (H) 01/23/2024       Scribed for Dr. Joaquin Gonzales by Hai Segovia.  I, Dr. Joaquin Gonzales have personally reviewed and agreed with the information entered by the Virtual Scribe. 05/16/24

## 2024-07-22 RX ORDER — MYCOPHENOLATE MOFETIL 250 MG/1
500 CAPSULE ORAL 2 TIMES DAILY
Qty: 360 CAPSULE | Refills: 3 | Status: CANCELLED | OUTPATIENT
Start: 2024-07-22 | End: 2025-07-22

## 2024-07-24 ENCOUNTER — TELEPHONE (OUTPATIENT)
Dept: TRANSPLANT | Facility: HOSPITAL | Age: 74
End: 2024-07-24
Payer: COMMERCIAL

## 2024-07-24 ENCOUNTER — SPECIALTY PHARMACY (OUTPATIENT)
Dept: PHARMACY | Facility: CLINIC | Age: 74
End: 2024-07-24

## 2024-07-24 DIAGNOSIS — Z94.0 KIDNEY REPLACED BY TRANSPLANT (HHS-HCC): Primary | ICD-10-CM

## 2024-07-24 RX ORDER — MYCOPHENOLATE MOFETIL 250 MG/1
500 CAPSULE ORAL 2 TIMES DAILY
Qty: 360 CAPSULE | Refills: 3 | Status: CANCELLED | OUTPATIENT
Start: 2024-07-22 | End: 2025-07-22

## 2024-07-24 RX ORDER — MYCOPHENOLATE MOFETIL 250 MG/1
500 CAPSULE ORAL 2 TIMES DAILY
Qty: 120 CAPSULE | Refills: 11 | Status: SHIPPED | OUTPATIENT
Start: 2024-07-24 | End: 2025-07-24

## 2024-07-30 ENCOUNTER — TELEPHONE (OUTPATIENT)
Dept: TRANSPLANT | Facility: HOSPITAL | Age: 74
End: 2024-07-30
Payer: COMMERCIAL

## 2024-07-30 ENCOUNTER — SPECIALTY PHARMACY (OUTPATIENT)
Dept: PHARMACY | Facility: CLINIC | Age: 74
End: 2024-07-30

## 2024-07-30 ENCOUNTER — PHARMACY VISIT (OUTPATIENT)
Dept: PHARMACY | Facility: CLINIC | Age: 74
End: 2024-07-30
Payer: COMMERCIAL

## 2024-07-30 PROCEDURE — RXMED WILLOW AMBULATORY MEDICATION CHARGE

## 2024-07-30 NOTE — TELEPHONE ENCOUNTER
Patient daughter called please put in his s/o in so he can get his blood work tomorrow. Please call when they have been submitted

## 2024-07-31 ENCOUNTER — LAB (OUTPATIENT)
Dept: LAB | Facility: LAB | Age: 74
End: 2024-07-31
Payer: COMMERCIAL

## 2024-07-31 DIAGNOSIS — N18.31 STAGE 3A CHRONIC KIDNEY DISEASE (MULTI): ICD-10-CM

## 2024-07-31 DIAGNOSIS — Z94.0 KIDNEY REPLACED BY TRANSPLANT (HHS-HCC): ICD-10-CM

## 2024-07-31 LAB
25(OH)D3 SERPL-MCNC: 30 NG/ML (ref 30–100)
ALBUMIN SERPL BCP-MCNC: 4 G/DL (ref 3.4–5)
ANION GAP SERPL CALC-SCNC: 18 MMOL/L (ref 10–20)
BUN SERPL-MCNC: 16 MG/DL (ref 6–23)
CALCIUM SERPL-MCNC: 9.2 MG/DL (ref 8.6–10.6)
CHLORIDE SERPL-SCNC: 104 MMOL/L (ref 98–107)
CO2 SERPL-SCNC: 21 MMOL/L (ref 21–32)
CREAT SERPL-MCNC: 0.96 MG/DL (ref 0.5–1.3)
CREAT UR-MCNC: 150.1 MG/DL (ref 20–370)
EGFRCR SERPLBLD CKD-EPI 2021: 83 ML/MIN/1.73M*2
ERYTHROCYTE [DISTWIDTH] IN BLOOD BY AUTOMATED COUNT: 18.3 % (ref 11.5–14.5)
GLUCOSE SERPL-MCNC: 107 MG/DL (ref 74–99)
HCT VFR BLD AUTO: 29.7 % (ref 41–52)
HGB BLD-MCNC: 10.1 G/DL (ref 13.5–17.5)
MAGNESIUM SERPL-MCNC: 1.54 MG/DL (ref 1.6–2.4)
MCH RBC QN AUTO: 25 PG (ref 26–34)
MCHC RBC AUTO-ENTMCNC: 34 G/DL (ref 32–36)
MCV RBC AUTO: 74 FL (ref 80–100)
NRBC BLD-RTO: 0 /100 WBCS (ref 0–0)
PHOSPHATE SERPL-MCNC: 2.7 MG/DL (ref 2.5–4.9)
PLATELET # BLD AUTO: 366 X10*3/UL (ref 150–450)
POTASSIUM SERPL-SCNC: 3.7 MMOL/L (ref 3.5–5.3)
PROT UR-ACNC: 80 MG/DL (ref 5–25)
PROT/CREAT UR: 0.53 MG/MG CREAT (ref 0–0.17)
PTH-INTACT SERPL-MCNC: 134.2 PG/ML (ref 18.5–88)
RBC # BLD AUTO: 4.04 X10*6/UL (ref 4.5–5.9)
SODIUM SERPL-SCNC: 139 MMOL/L (ref 136–145)
TACROLIMUS BLD-MCNC: 7.6 NG/ML
WBC # BLD AUTO: 6 X10*3/UL (ref 4.4–11.3)

## 2024-07-31 PROCEDURE — 85027 COMPLETE CBC AUTOMATED: CPT

## 2024-07-31 PROCEDURE — 80197 ASSAY OF TACROLIMUS: CPT

## 2024-07-31 PROCEDURE — 84156 ASSAY OF PROTEIN URINE: CPT

## 2024-07-31 PROCEDURE — 83735 ASSAY OF MAGNESIUM: CPT

## 2024-07-31 PROCEDURE — 83970 ASSAY OF PARATHORMONE: CPT

## 2024-07-31 PROCEDURE — 80069 RENAL FUNCTION PANEL: CPT

## 2024-07-31 PROCEDURE — 82570 ASSAY OF URINE CREATININE: CPT

## 2024-07-31 PROCEDURE — 82306 VITAMIN D 25 HYDROXY: CPT

## 2024-07-31 PROCEDURE — 36415 COLL VENOUS BLD VENIPUNCTURE: CPT

## 2024-08-05 RX ORDER — INSULIN ASPART 100 [IU]/ML
INJECTION, SOLUTION INTRAVENOUS; SUBCUTANEOUS
Refills: 5 | OUTPATIENT
Start: 2024-08-05

## 2024-08-12 RX ORDER — INSULIN ASPART 100 [IU]/ML
INJECTION, SOLUTION INTRAVENOUS; SUBCUTANEOUS
Refills: 5 | OUTPATIENT
Start: 2024-08-12

## 2024-09-07 ENCOUNTER — SPECIALTY PHARMACY (OUTPATIENT)
Dept: PHARMACY | Facility: CLINIC | Age: 74
End: 2024-09-07

## 2024-09-07 PROCEDURE — RXMED WILLOW AMBULATORY MEDICATION CHARGE

## 2024-09-10 ENCOUNTER — PHARMACY VISIT (OUTPATIENT)
Dept: PHARMACY | Facility: CLINIC | Age: 74
End: 2024-09-10
Payer: COMMERCIAL

## 2024-09-19 DIAGNOSIS — Z94.0 KIDNEY REPLACED BY TRANSPLANT (HHS-HCC): ICD-10-CM

## 2024-09-20 ENCOUNTER — SPECIALTY PHARMACY (OUTPATIENT)
Dept: PHARMACY | Facility: CLINIC | Age: 74
End: 2024-09-20

## 2024-09-20 PROCEDURE — RXMED WILLOW AMBULATORY MEDICATION CHARGE

## 2024-09-21 ENCOUNTER — PHARMACY VISIT (OUTPATIENT)
Dept: PHARMACY | Facility: CLINIC | Age: 74
End: 2024-09-21
Payer: COMMERCIAL

## 2024-09-24 DIAGNOSIS — Z94.0 KIDNEY REPLACED BY TRANSPLANT (HHS-HCC): ICD-10-CM

## 2024-09-24 DIAGNOSIS — N18.31 STAGE 3A CHRONIC KIDNEY DISEASE (MULTI): ICD-10-CM

## 2024-09-24 RX ORDER — TACROLIMUS 1 MG/1
2 CAPSULE ORAL 2 TIMES DAILY
Qty: 120 CAPSULE | Refills: 11 | Status: SHIPPED | OUTPATIENT
Start: 2024-09-24 | End: 2025-09-24

## 2024-10-15 PROCEDURE — RXMED WILLOW AMBULATORY MEDICATION CHARGE

## 2024-10-16 ENCOUNTER — OFFICE VISIT (OUTPATIENT)
Dept: TRANSPLANT | Facility: HOSPITAL | Age: 74
End: 2024-10-16
Payer: COMMERCIAL

## 2024-10-16 VITALS
DIASTOLIC BLOOD PRESSURE: 79 MMHG | TEMPERATURE: 99.1 F | BODY MASS INDEX: 31.03 KG/M2 | SYSTOLIC BLOOD PRESSURE: 152 MMHG | OXYGEN SATURATION: 94 % | HEART RATE: 75 BPM | WEIGHT: 222.5 LBS

## 2024-10-16 DIAGNOSIS — I10 HYPERTENSION, UNSPECIFIED TYPE: ICD-10-CM

## 2024-10-16 DIAGNOSIS — Z94.0 KIDNEY REPLACED BY TRANSPLANT (HHS-HCC): ICD-10-CM

## 2024-10-16 PROCEDURE — 3078F DIAST BP <80 MM HG: CPT | Performed by: HOSPITALIST

## 2024-10-16 PROCEDURE — 99214 OFFICE O/P EST MOD 30 MIN: CPT

## 2024-10-16 PROCEDURE — 3077F SYST BP >= 140 MM HG: CPT | Performed by: HOSPITALIST

## 2024-10-16 PROCEDURE — G2211 COMPLEX E/M VISIT ADD ON: HCPCS

## 2024-10-16 PROCEDURE — 1126F AMNT PAIN NOTED NONE PRSNT: CPT | Performed by: HOSPITALIST

## 2024-10-16 PROCEDURE — 3044F HG A1C LEVEL LT 7.0%: CPT | Performed by: HOSPITALIST

## 2024-10-16 PROCEDURE — 3060F POS MICROALBUMINURIA REV: CPT | Performed by: HOSPITALIST

## 2024-10-16 RX ORDER — ACETAMINOPHEN 500 MG
1000 TABLET ORAL EVERY 8 HOURS PRN
COMMUNITY

## 2024-10-16 RX ORDER — POLYETHYLENE GLYCOL 3350 17 G/17G
17 POWDER, FOR SOLUTION ORAL AS NEEDED
COMMUNITY

## 2024-10-16 SDOH — ECONOMIC STABILITY: FOOD INSECURITY: WITHIN THE PAST 12 MONTHS, THE FOOD YOU BOUGHT JUST DIDN'T LAST AND YOU DIDN'T HAVE MONEY TO GET MORE.: NEVER TRUE

## 2024-10-16 SDOH — ECONOMIC STABILITY: FOOD INSECURITY: WITHIN THE PAST 12 MONTHS, YOU WORRIED THAT YOUR FOOD WOULD RUN OUT BEFORE YOU GOT MONEY TO BUY MORE.: NEVER TRUE

## 2024-10-16 ASSESSMENT — ENCOUNTER SYMPTOMS
ABDOMINAL DISTENTION: 0
NAUSEA: 0
NERVOUS/ANXIOUS: 0
DIARRHEA: 0
HEADACHES: 0
PALPITATIONS: 0
FREQUENCY: 0
CONFUSION: 0
OCCASIONAL FEELINGS OF UNSTEADINESS: 0
SHORTNESS OF BREATH: 0
COUGH: 0
BACK PAIN: 0
LOSS OF SENSATION IN FEET: 0
HEMATURIA: 0
CHEST TIGHTNESS: 0
DEPRESSION: 0
VOMITING: 0

## 2024-10-16 ASSESSMENT — LIFESTYLE VARIABLES
HOW MANY STANDARD DRINKS CONTAINING ALCOHOL DO YOU HAVE ON A TYPICAL DAY: PATIENT DOES NOT DRINK
SKIP TO QUESTIONS 9-10: 1
HOW OFTEN DO YOU HAVE A DRINK CONTAINING ALCOHOL: NEVER
AUDIT-C TOTAL SCORE: 0
HOW OFTEN DO YOU HAVE SIX OR MORE DRINKS ON ONE OCCASION: NEVER

## 2024-10-16 ASSESSMENT — PATIENT HEALTH QUESTIONNAIRE - PHQ9
2. FEELING DOWN, DEPRESSED OR HOPELESS: NOT AT ALL
SUM OF ALL RESPONSES TO PHQ9 QUESTIONS 1 & 2: 0
1. LITTLE INTEREST OR PLEASURE IN DOING THINGS: NOT AT ALL

## 2024-10-16 ASSESSMENT — PAIN SCALES - GENERAL: PAINLEVEL_OUTOF10: 0-NO PAIN

## 2024-10-16 NOTE — PROGRESS NOTES
Albert Schwartz   73 y.o.  with a history of ESRD secondary to hypertension and diabetes s/p DDKT on 2/19/2017.  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  He had been on liver transplant list in 2015 and was removed from the list due to improved condition per the record..    Interim history: Patient complained of sciatic pain in the right lower extremity.  Discussed with him to have further plan with the primary care doctor about that.  Also discussed about increasing blood pressure pills today.      Review of Systems   Respiratory:  Negative for cough, chest tightness and shortness of breath.    Cardiovascular:  Negative for chest pain, palpitations and leg swelling.   Gastrointestinal:  Negative for abdominal distention, diarrhea, nausea and vomiting.   Genitourinary:  Negative for frequency, hematuria and urgency.   Musculoskeletal:  Negative for back pain.   Neurological:  Negative for headaches.   Psychiatric/Behavioral:  Negative for confusion. The patient is not nervous/anxious.         Objective:  Visit Vitals  /79   Pulse 75   Temp 37.3 °C (99.1 °F) (Temporal)   Wt 101 kg (222 lb 8 oz)   SpO2 94%   BMI 31.03 kg/m²   Smoking Status Former   BSA 2.25 m²      Physical Exam  HENT:      Head: Normocephalic and atraumatic.      Nose: Nose normal.      Mouth/Throat:      Mouth: Mucous membranes are moist.   Eyes:      Extraocular Movements: Extraocular movements intact.      Pupils: Pupils are equal, round, and reactive to light.   Cardiovascular:      Rate and Rhythm: Normal rate.      Pulses: Normal pulses.      Heart sounds: Normal heart sounds.   Pulmonary:      Effort: Pulmonary effort is normal.   Abdominal:      Palpations: Abdomen is soft.      Tenderness: There is no abdominal tenderness.  There is no guarding.   Musculoskeletal:         General: Normal range of motion.      Cervical back: Normal range of motion.   Skin:     General: Skin is warm.   Neurological:      General: No focal deficit present.      Mental Status: Mental status is at baseline.   Psychiatric:         Mood and Affect: Mood normal.            Current Outpatient Medications:     amLODIPine (Norvasc) 2.5 mg tablet, Take 2 tablets (5 mg) by mouth once daily., Disp: 180 tablet, Rfl: 3    atorvastatin (Lipitor) 20 mg tablet, Take 1 tablet (20 mg) by mouth once daily., Disp: , Rfl:     cholecalciferol (Vitamin D-3) 1,250 mcg (50,000 unit) capsule, Take 1 capsule (50,000 Units) by mouth once a week., Disp: , Rfl:     insulin degludec (Tresiba FlexTouch) 100 unit/mL (3 mL) injection, Inject 30 Units under the skin once daily in the morning., Disp: , Rfl:     insulin lispro (HumaLOG) 100 unit/mL injection, Inject 10 Units under the skin once daily at bedtime., Disp: , Rfl:     metoprolol succinate XL (Toprol-XL) 50 mg 24 hr tablet, TAKE 1 TABLET BY MOUTH EVERY DAY, Disp: 90 tablet, Rfl: 3    mycophenolate (Cellcept) 250 mg capsule, Take 2 capsules (500 mg) by mouth 2 times a day., Disp: 120 capsule, Rfl: 11    tacrolimus (Prograf) 1 mg capsule, Take 2 capsules (2 mg) by mouth 2 times a day., Disp: 120 capsule, Rfl: 11    triamcinolone (Kenalog) 0.1 % cream, Apply 1 Application topically 3 times a day as needed. to affected area 2-3 times a day, Disp: , Rfl:     Xarelto 20 mg tablet, TAKE 1 TABLET DAILY TAKE WITH A FULL MEAL, Disp: 90 tablet, Rfl: 3    acetaminophen (Tylenol) 500 mg tablet, Take 2 tablets (1,000 mg) by mouth every 8 hours if needed for mild pain (1 - 3)., Disp: , Rfl:     artificial tears, dextran-hypomel-glycerin, 0.1-0.3-0.2 % ophthalmic solution, Administer 2 drops into both eyes every 4 hours if needed for dry eyes., Disp: , Rfl:     blood sugar diagnostic (Accu-Chek Tori Plus test strp) strip, Use as instructed TWICE  DAILY FOR TESTING FOR nON INSULIN DEPENDENT DIABETES E11.319, Disp: , Rfl:     Fish OiL 60- mg capsule, Take 1 capsule (500 mg) by mouth once daily., Disp: , Rfl:     OneTouch UltraSoft Lancets misc, , Disp: , Rfl:     phenazopyridine (Pyridium) 200 mg tablet, Take 1 tablet (200 mg) by mouth 3 times a day as needed for bladder spasms. (Patient not taking: Reported on 10/16/2024), Disp: , Rfl:     polyethylene glycol (Glycolax, Miralax) 17 gram packet, Take 17 g by mouth if needed., Disp: , Rfl:     sildenafil (Viagra) 100 mg tablet, Take 1 tablet (100 mg) by mouth if needed for erectile dysfunction., Disp: 12 tablet, Rfl: 3     [unfilled]     No images are attached to the encounter.     Assessment and Plan :Albert Schwartz 73 y.o.  with a history of ESRD secondary to hypertension and diabetes s/p DDKT on 2/19/2017.  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  He had been on liver transplant list in 2015 and was removed from the list due to improved condition per the record..    Interim history: Patient complained of sciatic pain in the right lower extremity.  Discussed with him to have further plan with the primary care doctor about that.  Also discussed about increasing blood pressure pills today.    Allograft function: Stable creatinine in the range of 0.8-0.9, stable electrolytes.  Last UPC 0.53 patient is allergic to ARB.  Will continue to monitor    Immunosuppression: Recent tacrolimus levels are around 7.6 and levels of 5-8 continue with the current dose of tacrolimus 2 capsules twice daily and mycophenolate 500 twice daily.    Anemia/leukopenia: Seems to be mildly anemic but there is no indication for REINIER at this time continue to monitor and will check iron studies B12 and folate  with the next labs.    Bone mineral disease: Calcium and phosphorus levels are optimal vitamin D levels are 30 can do over-the-counter vitamin D supplements.    Hemodynamics: Blood pressures on mildly elevated increase amlodipine to 10 mg daily      General health care: Recommended age-appropriate screening, COVID shots annual dermatology visits,DEXA scan 2-3 yrs while on steroids .    Labs every 3 months and follow-up in a year    Sherwin South MD    Notes created by Estrella -Please excuse the Typos .

## 2024-10-16 NOTE — PATIENT INSTRUCTIONS
Increase amlodipine to 10 mg daily (either 2.5 mg x 4 tablets  daily or when you receive your 5 mg take 2 tablets daily)  Labs every 3 months  Return to clinic 1 year

## 2024-10-20 RX ORDER — AMLODIPINE BESYLATE 5 MG/1
10 TABLET ORAL DAILY
Qty: 180 TABLET | Refills: 3 | Status: SHIPPED | OUTPATIENT
Start: 2024-10-20 | End: 2025-10-20

## 2024-10-23 ENCOUNTER — PHARMACY VISIT (OUTPATIENT)
Dept: PHARMACY | Facility: CLINIC | Age: 74
End: 2024-10-23
Payer: COMMERCIAL

## 2024-10-23 ENCOUNTER — SPECIALTY PHARMACY (OUTPATIENT)
Dept: PHARMACY | Facility: CLINIC | Age: 74
End: 2024-10-23

## 2024-10-31 DIAGNOSIS — Z94.0 KIDNEY REPLACED BY TRANSPLANT (HHS-HCC): ICD-10-CM

## 2024-11-05 RX ORDER — TACROLIMUS 1 MG/1
2 CAPSULE ORAL 2 TIMES DAILY
Qty: 360 CAPSULE | Refills: 4 | Status: SHIPPED | OUTPATIENT
Start: 2024-11-05 | End: 2025-11-05

## 2024-11-20 ENCOUNTER — SPECIALTY PHARMACY (OUTPATIENT)
Dept: PHARMACY | Facility: CLINIC | Age: 74
End: 2024-11-20

## 2024-11-20 PROCEDURE — RXMED WILLOW AMBULATORY MEDICATION CHARGE

## 2024-11-21 ENCOUNTER — PHARMACY VISIT (OUTPATIENT)
Dept: PHARMACY | Facility: CLINIC | Age: 74
End: 2024-11-21
Payer: COMMERCIAL

## 2024-12-18 ENCOUNTER — SPECIALTY PHARMACY (OUTPATIENT)
Dept: PHARMACY | Facility: CLINIC | Age: 74
End: 2024-12-18

## 2024-12-18 PROCEDURE — RXMED WILLOW AMBULATORY MEDICATION CHARGE

## 2024-12-20 ENCOUNTER — PHARMACY VISIT (OUTPATIENT)
Dept: PHARMACY | Facility: CLINIC | Age: 74
End: 2024-12-20
Payer: COMMERCIAL

## 2025-01-20 ENCOUNTER — PHARMACY VISIT (OUTPATIENT)
Dept: PHARMACY | Facility: CLINIC | Age: 75
End: 2025-01-20
Payer: COMMERCIAL

## 2025-01-20 ENCOUNTER — SPECIALTY PHARMACY (OUTPATIENT)
Dept: PHARMACY | Facility: CLINIC | Age: 75
End: 2025-01-20

## 2025-01-20 PROCEDURE — RXMED WILLOW AMBULATORY MEDICATION CHARGE

## 2025-01-31 ENCOUNTER — TELEPHONE (OUTPATIENT)
Facility: HOSPITAL | Age: 75
End: 2025-01-31
Payer: COMMERCIAL

## 2025-01-31 DIAGNOSIS — Z94.0 KIDNEY REPLACED BY TRANSPLANT (HHS-HCC): ICD-10-CM

## 2025-01-31 NOTE — TELEPHONE ENCOUNTER
Patient called requesting to speak with coordinator about  making sure lab orders are placed.  .  Patient call back number is  [phone]  .

## 2025-01-31 NOTE — TELEPHONE ENCOUNTER
Patient called requesting to speak with coordinator about labs please put these are in the system.  Patient call back number is [phone].

## 2025-02-11 ENCOUNTER — SPECIALTY PHARMACY (OUTPATIENT)
Dept: PHARMACY | Facility: CLINIC | Age: 75
End: 2025-02-11

## 2025-02-11 PROCEDURE — RXMED WILLOW AMBULATORY MEDICATION CHARGE

## 2025-02-12 ENCOUNTER — PHARMACY VISIT (OUTPATIENT)
Dept: PHARMACY | Facility: CLINIC | Age: 75
End: 2025-02-12
Payer: COMMERCIAL

## 2025-02-28 DIAGNOSIS — Z94.0 KIDNEY REPLACED BY TRANSPLANT (HHS-HCC): ICD-10-CM

## 2025-03-19 ENCOUNTER — SPECIALTY PHARMACY (OUTPATIENT)
Dept: PHARMACY | Facility: CLINIC | Age: 75
End: 2025-03-19

## 2025-03-19 PROCEDURE — RXMED WILLOW AMBULATORY MEDICATION CHARGE

## 2025-03-21 ENCOUNTER — PHARMACY VISIT (OUTPATIENT)
Dept: PHARMACY | Facility: CLINIC | Age: 75
End: 2025-03-21
Payer: COMMERCIAL

## 2025-03-28 DIAGNOSIS — Z94.0 KIDNEY REPLACED BY TRANSPLANT (HHS-HCC): ICD-10-CM

## 2025-03-31 ENCOUNTER — NURSE ONLY (OUTPATIENT)
Facility: HOSPITAL | Age: 75
End: 2025-03-31
Payer: COMMERCIAL

## 2025-03-31 LAB
25(OH)D3 SERPL-MCNC: 70 NG/ML (ref 30–100)
ALBUMIN SERPL BCP-MCNC: 4 G/DL (ref 3.4–5)
ANION GAP SERPL CALC-SCNC: 16 MMOL/L (ref 10–20)
BUN SERPL-MCNC: 15 MG/DL (ref 6–23)
CALCIUM SERPL-MCNC: 9.3 MG/DL (ref 8.6–10.6)
CHLORIDE SERPL-SCNC: 107 MMOL/L (ref 98–107)
CO2 SERPL-SCNC: 21 MMOL/L (ref 21–32)
CREAT SERPL-MCNC: 0.94 MG/DL (ref 0.5–1.3)
CREAT UR-MCNC: 186 MG/DL (ref 20–370)
EGFRCR SERPLBLD CKD-EPI 2021: 85 ML/MIN/1.73M*2
ERYTHROCYTE [DISTWIDTH] IN BLOOD BY AUTOMATED COUNT: 19.5 % (ref 11.5–14.5)
GLUCOSE SERPL-MCNC: 105 MG/DL (ref 74–99)
HCT VFR BLD AUTO: 29.7 % (ref 41–52)
HGB BLD-MCNC: 9.8 G/DL (ref 13.5–17.5)
MAGNESIUM SERPL-MCNC: 1.37 MG/DL (ref 1.6–2.4)
MCH RBC QN AUTO: 22.6 PG (ref 26–34)
MCHC RBC AUTO-ENTMCNC: 33 G/DL (ref 32–36)
MCV RBC AUTO: 69 FL (ref 80–100)
NRBC BLD-RTO: 0 /100 WBCS (ref 0–0)
PHOSPHATE SERPL-MCNC: 2.8 MG/DL (ref 2.5–4.9)
PLATELET # BLD AUTO: 475 X10*3/UL (ref 150–450)
POTASSIUM SERPL-SCNC: 3.9 MMOL/L (ref 3.5–5.3)
PROT UR-ACNC: 125 MG/DL (ref 5–25)
PROT/CREAT UR: 0.67 MG/MG CREAT (ref 0–0.17)
PTH-INTACT SERPL-MCNC: 109.1 PG/ML (ref 18.5–88)
RBC # BLD AUTO: 4.33 X10*6/UL (ref 4.5–5.9)
SODIUM SERPL-SCNC: 140 MMOL/L (ref 136–145)
TACROLIMUS BLD-MCNC: 11.8 NG/ML
WBC # BLD AUTO: 8.5 X10*3/UL (ref 4.4–11.3)

## 2025-03-31 PROCEDURE — 84100 ASSAY OF PHOSPHORUS: CPT | Performed by: HOSPITALIST

## 2025-03-31 PROCEDURE — 82306 VITAMIN D 25 HYDROXY: CPT | Performed by: HOSPITALIST

## 2025-03-31 PROCEDURE — 85027 COMPLETE CBC AUTOMATED: CPT | Performed by: HOSPITALIST

## 2025-03-31 PROCEDURE — 82570 ASSAY OF URINE CREATININE: CPT | Performed by: HOSPITALIST

## 2025-03-31 PROCEDURE — 80197 ASSAY OF TACROLIMUS: CPT | Performed by: HOSPITALIST

## 2025-03-31 PROCEDURE — 83735 ASSAY OF MAGNESIUM: CPT | Performed by: HOSPITALIST

## 2025-03-31 PROCEDURE — 83970 ASSAY OF PARATHORMONE: CPT | Performed by: HOSPITALIST

## 2025-04-16 ENCOUNTER — SPECIALTY PHARMACY (OUTPATIENT)
Dept: PHARMACY | Facility: CLINIC | Age: 75
End: 2025-04-16

## 2025-04-16 PROCEDURE — RXMED WILLOW AMBULATORY MEDICATION CHARGE

## 2025-04-17 ENCOUNTER — PHARMACY VISIT (OUTPATIENT)
Dept: PHARMACY | Facility: CLINIC | Age: 75
End: 2025-04-17
Payer: COMMERCIAL

## 2025-04-25 DIAGNOSIS — Z94.0 KIDNEY REPLACED BY TRANSPLANT (HHS-HCC): ICD-10-CM

## 2025-05-06 ENCOUNTER — SPECIALTY PHARMACY (OUTPATIENT)
Dept: PHARMACY | Facility: CLINIC | Age: 75
End: 2025-05-06

## 2025-05-08 ENCOUNTER — PHARMACY VISIT (OUTPATIENT)
Dept: PHARMACY | Facility: CLINIC | Age: 75
End: 2025-05-08
Payer: COMMERCIAL

## 2025-05-08 PROCEDURE — RXMED WILLOW AMBULATORY MEDICATION CHARGE

## 2025-05-23 DIAGNOSIS — Z94.0 KIDNEY REPLACED BY TRANSPLANT (HHS-HCC): ICD-10-CM

## 2025-06-04 ENCOUNTER — SPECIALTY PHARMACY (OUTPATIENT)
Dept: PHARMACY | Facility: CLINIC | Age: 75
End: 2025-06-04

## 2025-06-12 ENCOUNTER — SPECIALTY PHARMACY (OUTPATIENT)
Dept: PHARMACY | Facility: CLINIC | Age: 75
End: 2025-06-12

## 2025-06-12 PROCEDURE — RXMED WILLOW AMBULATORY MEDICATION CHARGE

## 2025-06-13 ENCOUNTER — PHARMACY VISIT (OUTPATIENT)
Dept: PHARMACY | Facility: CLINIC | Age: 75
End: 2025-06-13
Payer: COMMERCIAL

## 2025-06-20 DIAGNOSIS — Z94.0 KIDNEY REPLACED BY TRANSPLANT (HHS-HCC): ICD-10-CM

## 2025-07-10 PROCEDURE — RXMED WILLOW AMBULATORY MEDICATION CHARGE

## 2025-07-11 ENCOUNTER — PHARMACY VISIT (OUTPATIENT)
Dept: PHARMACY | Facility: CLINIC | Age: 75
End: 2025-07-11
Payer: COMMERCIAL

## 2025-07-18 DIAGNOSIS — Z94.0 KIDNEY REPLACED BY TRANSPLANT (HHS-HCC): ICD-10-CM

## 2025-08-05 DIAGNOSIS — Z94.0 KIDNEY REPLACED BY TRANSPLANT (HHS-HCC): ICD-10-CM

## 2025-08-08 RX ORDER — MYCOPHENOLATE MOFETIL 250 MG/1
500 CAPSULE ORAL 2 TIMES DAILY
Qty: 120 CAPSULE | Refills: 11 | Status: SHIPPED | OUTPATIENT
Start: 2025-08-08 | End: 2026-08-08

## 2025-08-13 ENCOUNTER — SPECIALTY PHARMACY (OUTPATIENT)
Dept: PHARMACY | Facility: CLINIC | Age: 75
End: 2025-08-13

## 2025-08-13 PROCEDURE — RXMED WILLOW AMBULATORY MEDICATION CHARGE

## 2025-08-14 ENCOUNTER — PHARMACY VISIT (OUTPATIENT)
Dept: PHARMACY | Facility: CLINIC | Age: 75
End: 2025-08-14
Payer: COMMERCIAL

## 2025-08-15 DIAGNOSIS — Z94.0 KIDNEY REPLACED BY TRANSPLANT (HHS-HCC): ICD-10-CM

## (undated) DEVICE — STATLOCK, FOLEY SWIVEL, SILICONE TRICOT

## (undated) DEVICE — MANIFOLD, 4 PORT NEPTUNE STANDARD

## (undated) DEVICE — DILATOR SET, S-CURVED, URETHRAL,  8.0-20.0

## (undated) DEVICE — Device

## (undated) DEVICE — GUIDEWIRE, ULTRA TRACK, HYBRID, 0.035 IN X 150CM

## (undated) DEVICE — GUIDEWIRE, STRAIGHT, E-Z GLIDER, 0.038 IN X 150 CM

## (undated) DEVICE — COVER, CART, 45 X 27 X 48 IN, CLEAR

## (undated) DEVICE — COLLECTION BAG, FLUID, F/STERIS LOOP, STERILE

## (undated) DEVICE — CATHETER, URETHRAL, FOLEY, 2 WAY, CARSON, COUDE, BARDEX IC, 18 FR, 5 CC, SILVER,LATEX

## (undated) DEVICE — TOWEL, SURGICAL, NEURO, O/R, 16 X 26, BLUE, STERILE

## (undated) DEVICE — CATHETER, URETERAL, OPEN END, 5 FR, 70 CM

## (undated) DEVICE — BAG, DRAINAGE, URINARY, W/ANTI-REFLUX CHAMBER, INFECTION CON